# Patient Record
Sex: MALE | Race: WHITE | NOT HISPANIC OR LATINO | Employment: FULL TIME | URBAN - METROPOLITAN AREA
[De-identification: names, ages, dates, MRNs, and addresses within clinical notes are randomized per-mention and may not be internally consistent; named-entity substitution may affect disease eponyms.]

---

## 2017-01-05 ENCOUNTER — OFFICE VISIT (OUTPATIENT)
Dept: BEHAVIORAL HEALTH | Facility: PHYSICIAN GROUP | Age: 42
End: 2017-01-05
Payer: COMMERCIAL

## 2017-01-05 VITALS — HEART RATE: 78 BPM | TEMPERATURE: 98.2 F | RESPIRATION RATE: 16 BRPM

## 2017-01-05 DIAGNOSIS — F31.31 BIPOLAR 1 DISORDER, DEPRESSED, MILD (HCC): ICD-10-CM

## 2017-01-05 PROCEDURE — 99213 OFFICE O/P EST LOW 20 MIN: CPT | Performed by: PSYCHIATRY & NEUROLOGY

## 2017-01-05 NOTE — PROGRESS NOTES
[facility]  PSYCHIATRIC FOLLOW-UP NOTE    Name: Chaitanya Kelly  MRN: [id number]  : 1975  Age: 41 y.o.  Date of assessment: 2017  PCP: Ignacio Castillo M.D.  Persons in attendance: Patient    REASON FOR VISIT/CHIEF COMPLAINT (as stated by Patient):  Chaitanya Kelly is a 41 y.o., White male, attending follow-up appointment for   Chief Complaint   Patient presents with   • Other     The patient is seen today for follow up after three monthsand he is doing well on his medications. The patient did not do the lab.    .      HISTORY OF PRESENT ILLNESS:    This is a 42 yo male, , employed, seen today for follow up on his bipolar disorder. The patient agreed to do his lab next time. The patient reported that he has refills on his medications. The patient was focused on his stress at work and his wife is on disability. The patients parents are helping them financially.    PSYCHOSOCIAL CHANGES SINCE PREVIOUS CONTACT:  Unchanged. The patient is working for [employer] full time and his work load is increasing every day.     RESPONSE TO TREATMENT:  Good.    MEDICATION SIDE EFFECTS:  Denied.    REVIEW OF SYSTEMS:        Constitutional negative   Eyes negative   Ears/Nose/Mouth/Throat negative   Cardiovascular negative   Respiratory positive - sleep apnea.   Gastrointestinal negative   Genitourinary negative   Muscular negative   Integumentary negative   Neurological negative   Endocrine negative   Hematologic/Lymphatic negative       PSYCHIATRIC EXAMINATION/MENTAL STATUS  Pulse 78  Temp(Src) 36.8 °C (98.2 °F)  Resp 16  Participation: Active verbal participation, Attentive and Engaged  Grooming:Casual  Orientation: Alert and Fully Oriented  Eye contact: Good  Behavior:Calm   Mood: Anxious  Affect: Flexible and Full range  Thought process: Logical and Goal-directed  Thought content:  Within normal limits  Speech: Rate within normal limits  Perception:  Within normal limits  Memory:  No gross evidence  of memory deficits  Insight: Good  Judgment: Good  Family/couple interaction observations:   Other:    Current risk:    Suicide: Low   Homicide: Low   Self-harm: Low  Relapse: Low  Other:   Crisis Safety Plan reviewed?Yes  If evidence of imminent risk is present, intervention/plan:    Medical Records/Labs/Diagnostic Tests Reviewed: yes.    Medical Records/Labs/Diagnostic Tests Ordered:   The patient agreed to do blood work on follow up.    DIAGNOSTIC IMPRESSION(S):  1. Bipolar 1 disorder, depressed, mild (HCC)           ASSESSMENT AND PLAN:    Bipolar 1, mild depression  Continue same medications  Lithium Er 450 mg twice a day  Bupropion  mg twice a day  Lamotrigine 100 mg per day  Follow up in three months.     More than 50% of face-to-face time in this 30 minute visit was spent in psychotherapy/counseling.    Topics addressed include:  We focused on medication compliance because that is one of the important factor for stability. The patient has good sense of managing his stress at work. The patients wife is supportive and helps him at home. The patient is sleeping well. We talked about weight reduction and diet.     Matthew Snyder M.D.

## 2017-03-22 RX ORDER — LITHIUM CARBONATE 450 MG
TABLET, EXTENDED RELEASE ORAL
Qty: 60 TAB | Refills: 2 | Status: SHIPPED | OUTPATIENT
Start: 2017-03-22 | End: 2017-06-22 | Stop reason: SDUPTHER

## 2017-03-22 RX ORDER — BUPROPION HYDROCHLORIDE 150 MG/1
TABLET, EXTENDED RELEASE ORAL
Qty: 60 TAB | Refills: 2 | Status: SHIPPED | OUTPATIENT
Start: 2017-03-22 | End: 2017-06-22 | Stop reason: SDUPTHER

## 2017-03-22 RX ORDER — LAMOTRIGINE 100 MG/1
TABLET ORAL
Qty: 30 TAB | Refills: 2 | Status: SHIPPED | OUTPATIENT
Start: 2017-03-22 | End: 2017-06-22 | Stop reason: SDUPTHER

## 2017-03-30 ENCOUNTER — SLEEP CENTER VISIT (OUTPATIENT)
Dept: SLEEP MEDICINE | Facility: MEDICAL CENTER | Age: 42
End: 2017-03-30
Payer: COMMERCIAL

## 2017-03-30 VITALS
BODY MASS INDEX: 35.16 KG/M2 | WEIGHT: 274 LBS | HEART RATE: 82 BPM | DIASTOLIC BLOOD PRESSURE: 98 MMHG | SYSTOLIC BLOOD PRESSURE: 134 MMHG | RESPIRATION RATE: 15 BRPM | HEIGHT: 74 IN

## 2017-03-30 DIAGNOSIS — R03.0: ICD-10-CM

## 2017-03-30 DIAGNOSIS — G47.33 OBSTRUCTIVE SLEEP APNEA: ICD-10-CM

## 2017-03-30 DIAGNOSIS — F31.32 BIPOLAR 1 DISORDER, DEPRESSED, MODERATE (HCC): Chronic | ICD-10-CM

## 2017-03-30 DIAGNOSIS — E66.9 OBESITY (BMI 30-39.9): ICD-10-CM

## 2017-03-30 PROCEDURE — 99213 OFFICE O/P EST LOW 20 MIN: CPT | Performed by: NURSE PRACTITIONER

## 2017-03-30 NOTE — MR AVS SNAPSHOT
"        Chaitanya Kelly   3/30/2017 10:00 AM   Sleep Center Visit   MRN: [id number]    Department:  [facility]   Dept Phone:  [phone]    Description:  Male : 1975   Provider:  NICHOL Colón           Reason for Visit     Results OPO, chip DL       Allergies as of 3/30/2017     Allergen Noted Reactions    Latex 2012   Itching    Ibuprofen 2011       Or [place],  Reaction to other RX      You were diagnosed with     Obstructive sleep apnea   [323745]       Obesity (BMI 30-39.9)   [608612]       Increased blood pressure (not hypertension)   [129727]       Bipolar 1 disorder, depressed, moderate (CMS-HCC)   [661342]         Vital Signs     Blood Pressure Pulse Respirations Height Weight Body Mass Index    134/98 mmHg 82 15 1.88 m (6' 2\") 124.286 kg (274 lb) 35.16 kg/m2    Smoking Status                   Former Smoker           Basic Information     Date Of Birth Sex Race Ethnicity Preferred Language    1975 Male White Non- English      Your appointments     2017 11:00 AM   Follow Up Med Management with Matthew Snyder M.D.   [facility] [address] [place] [address])    [address] [place] [address]  [address]  [facility] [address]   [phone]            Justo 15, 2017  9:00 AM   [facility]    [address] [place] [address] [phone]           You will be receiving a confirmation call a few days before your appointment from our automated call confirmation system.            Sep 19, 2017  9:00 AM   Follow UP with NICHOL Colón   [facility] (--)    [address] [place] [address] A  [facility] [address] [phone]              Problem List              ICD-10-CM Priority Class Noted - Resolved    Dyslipidemia E78.5   2014 - Present    Obesity (BMI 30-39.9) E66.9   2014 - Present    Family history of early CAD Z82.49   2014 - Present   " Obstructive sleep apnea G47.33   4/15/2016 - Present    Neck pain M54.2   9/9/2016 - Present    Increased blood pressure (not hypertension) R03.0   9/9/2016 - Present    Bipolar 1 disorder, depressed, moderate (CMS-HCC) (Chronic) F31.32  Chronic 10/13/2016 - Present    Chronic fatigue R53.82   12/1/2016 - Present    Mild intermittent asthma without complication J45.20   12/22/2016 - Present    Episodic tension-type headache, not intractable G44.219   12/22/2016 - Present      Health Maintenance        Date Due Completion Dates    IMM DTaP/Tdap/Td Vaccine (1 - Tdap) 9/23/1993 ---    IMM PNEUMOCOCCAL 19-64 (ADULT) MEDIUM RISK SERIES (1 of 1 - PPSV23) 9/23/1993 ---    IMM INFLUENZA (1) 9/1/2016 ---            Current Immunizations     No immunizations on file.      Below and/or attached are the medications your provider expects you to take. Review all of your home medications and newly ordered medications with your provider and/or pharmacist. Follow medication instructions as directed by your provider and/or pharmacist. Please keep your medication list with you and share with your provider. Update the information when medications are discontinued, doses are changed, or new medications (including over-the-counter products) are added; and carry medication information at all times in the event of emergency situations     Allergies:  LATEX - Itching     IBUPROFEN - (reactions not documented)               Medications  Valid as of: March 30, 2017 - 10:47 AM    Generic Name Brand Name Tablet Size Instructions for use    BuPROPion HCl (TABLET SR 12 HR) WELLBUTRIN- MG TAKE ONE TABLET BY MOUTH TWICE DAILY        LamoTRIgine (Tab) LAMICTAL 25 MG Take 25 mg by mouth every day.        LamoTRIgine (Tab) LAMICTAL 100 MG TAKE ONE TABLET BY MOUTH IN THE MORNING        Lithium Carbonate (Tab CR) ESKALITH  MG TAKE ONE TABLET BY MOUTH TWICE DAILY        .                 Medicines prescribed today were sent to:     [facility] [address] [place] [address] [address] [address]    Phone: [phone] Fax: [phone]    Open 24 Hours?: No      Medication refill instructions:       If your prescription bottle indicates you have medication refills left, it is not necessary to call your provider’s office. Please contact your pharmacy and they will refill your medication.    If your prescription bottle indicates you do not have any refills left, you may request refills at any time through one of the following ways: The online [employer] system (except Urgent Care), by calling your provider’s office, or by asking your pharmacy to contact your provider’s office with a refill request. Medication refills are processed only during regular business hours and may not be available until the next business day. Your provider may request additional information or to have a follow-up visit with you prior to refilling your medication.   *Please Note: Medication refills are assigned a new Rx number when refilled electronically. Your pharmacy may indicate that no refills were authorized even though a new prescription for the same medication is available at the pharmacy. Please request the medicine by name with the pharmacy before contacting your provider for a refill.        Instructions    1. Continue auto CPAP nightly  2. Clean mask and tubing weekly  3. Follow-up in 6 months, sooner if needed. If fatigue persists consider in lab titration.       Other Notes About Your Plan     DME:  Israel Osorio ph [phone] / fax [phone]             [employer] Access Code: Activation code not generated  Current [employer] Status: Active

## 2017-03-30 NOTE — PATIENT INSTRUCTIONS
1. Continue auto CPAP nightly  2. Clean mask and tubing weekly  3. Follow-up in 6 months, sooner if needed. If fatigue persists consider in lab titration.

## 2017-03-30 NOTE — PROGRESS NOTES
Chief Complaint   Patient presents with   • Results     OPO, chip DL          HPI: This patient is a 41 y.o. male, who presents for follow-up of TOBI.  Initial sleep study in 2010 indicated severe TOBI with an  events per hour and a minimum oxygen saturation of 87%. He was treated with CPAP 16 cm H2O but his machine malfunctioned. Home sleep study April 2016 demonstrated an AHI of 22.8 and a minimum saturation of 85%. He was provided a new auto CPAP machine. He is compliant with auto CPAP 12-15 cm H2O. Compliance report over the past 90 days indicates 98% use, average use of 5 hours 35 minutes per night, AHI has been reduced to 0.4. Overnight oximetry indicates adequate saturation on CPAP nasal SPO2 of 92.3%. Patient continues to complain of some fatigue. He does not feel as refreshed as he did on CPAP at 16 cm H2O. He attributes part of this to his job and medications for bipolar disorder. He feels his symptoms of bipolar disease are well managed with current medications. He has had significant stressors on his job. He will be changing positions in hopes that this will help his fatigue. We discussed trial of higher pressure vs in lab study. He may consider these at his next visit, first he would like to change job positions to determine if this helps with his fatigue.      Past Medical History   Diagnosis Date   • ASTHMA    • Headache(784.0)    • Bipolar 2 disorder    • Bipolar disorder (CMS-[facility])        Social History   Substance Use Topics   • Smoking status: Former Smoker     Quit date: 01/01/2007   • Smokeless tobacco: Never Used   • Alcohol Use: No       Family History   Problem Relation Age of Onset   • Heart Disease Father 41     heart attack   • Heart Disease Paternal Aunt    • Heart Disease Paternal Uncle    • Stroke Paternal Uncle    • Heart Disease Paternal Grandfather        Current medications as of today   Current Outpatient Prescriptions   Medication Sig Dispense Refill   • buPROPion SR  "(WELLBUTRIN-SR) 150 MG TABLET SR 12 HR sustained-release tablet TAKE ONE TABLET BY MOUTH TWICE DAILY 60 Tab 2   • lithium CR (ESKALITH CR) 450 MG Tab CR TAKE ONE TABLET BY MOUTH TWICE DAILY 60 Tab 2   • lamotrigine (LAMICTAL) 100 MG Tab TAKE ONE TABLET BY MOUTH IN THE MORNING 30 Tab 2   • lamotrigine (LAMICTAL) 25 MG TABS Take 25 mg by mouth every day.       No current facility-administered medications for this visit.       Allergies: Latex and Ibuprofen    Blood pressure 134/98, pulse 82, resp. rate 15, height 1.88 m (6' 2\"), weight 124.286 kg (274 lb).      ROS:   Constitutional: Denies fevers, chills, night sweats, weight loss. Positive for fatigue.  HEENT: Denies earache, difficulty hearing, tinnitus, nasal congestion, hoarseness  Cardiovascular: Denies chest pain, tightness, palpitations, orthopnea or edema  Respiratory: Denies cough, wheeze, dyspnea, hemoptysis  Sleep: See HPI  GI: Denies heartburn, dysphagia, nausea, abdominal pain, diarrhea or constipation  : Denies frequent urination, hematuria, discharge or painful urination  Musculoskeletal: Positive for chronic pain  Neurological: Positive for migraine  Skin: No rashes    Physical exam:   Appearance: Well-nourished, well-developed, in no acute distress  HEENT: Normocephalic, atraumatic, white sclera, PERRLA  Respiratory: no intercostal retractions or accessory muscle use   Gait: Normal  Digits: No clubbing, cyanosis  Motor: No focal deficits  Orientation: Oriented to time, person and place    Diagnosis:  1. Obstructive sleep apnea     2. Obesity (BMI 30-39.9)     3. Increased blood pressure (not hypertension)     4. Bipolar 1 disorder, depressed, moderate (CMS-HCC)         Plan:  1. Continue auto CPAP nightly  2. Clean mask and tubing weekly  3. Follow-up in 6 months, sooner if needed. If fatigue persists consider in lab titration.    "

## 2017-04-06 ENCOUNTER — APPOINTMENT (OUTPATIENT)
Dept: BEHAVIORAL HEALTH | Facility: PHYSICIAN GROUP | Age: 42
End: 2017-04-06

## 2017-06-22 RX ORDER — BUPROPION HYDROCHLORIDE 150 MG/1
TABLET, EXTENDED RELEASE ORAL
Qty: 60 TAB | Refills: 1 | Status: SHIPPED | OUTPATIENT
Start: 2017-06-22 | End: 2017-08-21 | Stop reason: SDUPTHER

## 2017-06-22 RX ORDER — LAMOTRIGINE 100 MG/1
TABLET ORAL
Qty: 30 TAB | Refills: 1 | Status: SHIPPED | OUTPATIENT
Start: 2017-06-22 | End: 2017-08-21 | Stop reason: SDUPTHER

## 2017-06-22 RX ORDER — LITHIUM CARBONATE 450 MG
TABLET, EXTENDED RELEASE ORAL
Qty: 60 TAB | Refills: 0 | Status: SHIPPED | OUTPATIENT
Start: 2017-06-22 | End: 2017-07-24 | Stop reason: SDUPTHER

## 2017-07-24 RX ORDER — LITHIUM CARBONATE 450 MG
TABLET, EXTENDED RELEASE ORAL
Qty: 60 TAB | Refills: 1 | Status: SHIPPED | OUTPATIENT
Start: 2017-07-24 | End: 2017-09-21 | Stop reason: SDUPTHER

## 2017-07-24 NOTE — TELEPHONE ENCOUNTER
Was the patient seen in the last year in this department? Yes     Does patient have an active prescription for medications requested? Yes     Received Request Via: Pharmacy     FYI: Last seen 1/2017, L/M for pt to call and schedule an appointment

## 2017-07-25 ENCOUNTER — OFFICE VISIT (OUTPATIENT)
Dept: MEDICAL GROUP | Facility: PHYSICIAN GROUP | Age: 42
End: 2017-07-25
Payer: COMMERCIAL

## 2017-07-25 VITALS
HEART RATE: 61 BPM | DIASTOLIC BLOOD PRESSURE: 80 MMHG | OXYGEN SATURATION: 99 % | TEMPERATURE: 97.5 F | SYSTOLIC BLOOD PRESSURE: 126 MMHG | BODY MASS INDEX: 34.27 KG/M2 | HEIGHT: 74 IN | WEIGHT: 267 LBS | RESPIRATION RATE: 16 BRPM

## 2017-07-25 DIAGNOSIS — R19.00 ABDOMINAL WALL BULGE: ICD-10-CM

## 2017-07-25 DIAGNOSIS — Z13.6 SCREENING FOR CARDIOVASCULAR CONDITION: ICD-10-CM

## 2017-07-25 DIAGNOSIS — F31.32 BIPOLAR 1 DISORDER, DEPRESSED, MODERATE (HCC): Chronic | ICD-10-CM

## 2017-07-25 DIAGNOSIS — Z13.1 SCREENING FOR DIABETES MELLITUS (DM): ICD-10-CM

## 2017-07-25 PROCEDURE — 99214 OFFICE O/P EST MOD 30 MIN: CPT | Performed by: INTERNAL MEDICINE

## 2017-07-25 NOTE — PROGRESS NOTES
"Subjective:   Chaitanya Kelly is a 41 y.o. male here today for abdominal wall bulge, bipolar disorder    Abdominal wall bulge  Pt reports that he has an area of abdominal bulge that is located above his belly button. He noticed it in April. He states that when he pushes on it it feels like a soft fluid filled area and it causes pain in the area. He denies fevers/chills, nausea/vomiting, constipation/diarrhea. There has been no significant change in size or severity in the past few months. He has no difficulty lifting objects but if he puts pressure on his abdomen then it hurts. He has not had similar symptoms in the past.     Bipolar 1 disorder, depressed, moderate (CMS-HCC)  Pt follows with Dr. Snyder of psychiatry for this. He is on buproprion, lamotrigine, and lithium all managed by psychiatry. He reports that his mood is good and that this is stable. He denies suicidal or homicidal ideation.        Current medicines (including changes today)  Current Outpatient Prescriptions   Medication Sig Dispense Refill   • lithium CR (ESKALITH CR) 450 MG Tab CR TAKE ONE TABLET BY MOUTH TWICE DAILY 60 Tab 1   • lamotrigine (LAMICTAL) 100 MG Tab TAKE ONE TABLET BY MOUTH IN THE MORNING 30 Tab 1   • buPROPion SR (WELLBUTRIN-SR) 150 MG TABLET SR 12 HR sustained-release tablet TAKE ONE TABLET BY MOUTH TWICE DAILY 60 Tab 1   • lamotrigine (LAMICTAL) 25 MG TABS Take 25 mg by mouth every day.       No current facility-administered medications for this visit.     He  has a past medical history of ASTHMA; Headache; Bipolar 2 disorder; and Bipolar disorder (CMS-HCC).    ROS   No fevers/chills, no nausea/vomiting     Objective:     Blood pressure 126/80, pulse 61, temperature 36.4 °C (97.5 °F), resp. rate 16, height 1.88 m (6' 2\"), weight 121.11 kg (267 lb), SpO2 99 %. Body mass index is 34.27 kg/(m^2).   Physical Exam:  Constitutional: Alert & oriented, no acute distress  Eye: Conjunctiva clear, lids normal, no discharge  ENMT: " Lips without lesions, normal external nose and ears  Respiratory: Unlabored respiratory effort, lungs clear to auscultation, no wheezes, no ronchi  Cardiovascular: Normal S1, S2, no murmur, no edema  Abdomen: Mild protrusion of epigastric area that is not tender to palpation and most likely a hernia  Skin: Warm, dry, good turgor, no rashes in visible areas  Neuro: No overt focal neurologic deficits, normal gait  Psych: Normal mood and affect      Assessment and Plan:   The following treatment plan was discussed    1. Abdominal wall bulge  Likely hernia. Pt counseled on ER indications (symptoms indicative of strangulation). Will get ultrasound to further evaluate. If positive will refer to general surgery  - CBC WITH DIFFERENTIAL; Future  - US-HERNIA ABDOMEN; Future    2. Bipolar 1 disorder, depressed, moderate (CMS-HCC)  Well controlled on current medications. Continue follow up and treatment per psychiatry    3. Screening for cardiovascular condition  - LIPID PROFILE; Future    4. Screening for diabetes mellitus (DM)  - COMP METABOLIC PANEL; Future    5. BMI 34.0-34.9,adult  - REFERRAL TO [facility] IMPROVEMENT PROGRAMS (HIP) Services Requested:: Weight Management Program; Reason for Visit:: Overweight/Obesity; Future      Followup: Return in about 4 months (around 11/25/2017).    Please note that this dictation was created using voice recognition software. I have made every reasonable attempt to correct obvious errors, but I expect that there are errors of grammar and possibly content that I did not discover before finalizing the note.

## 2017-07-25 NOTE — ASSESSMENT & PLAN NOTE
Pt reports that he has an area of abdominal bulge that is located above his belly button. He noticed it in April. He states that when he pushes on it it feels like a soft fluid filled area and it causes pain in the area. He denies fevers/chills, nausea/vomiting, constipation/diarrhea. There has been no significant change in size or severity in the past few months. He has no difficulty lifting objects but if he puts pressure on his abdomen then it hurts. He has not had similar symptoms in the past.

## 2017-07-25 NOTE — ASSESSMENT & PLAN NOTE
Pt follows with Dr. Snyder of psychiatry for this. He is on buproprion, lamotrigine, and lithium all managed by psychiatry. He reports that his mood is good and that this is stable. He denies suicidal or homicidal ideation.

## 2017-08-04 ENCOUNTER — HOSPITAL ENCOUNTER (OUTPATIENT)
Dept: RADIOLOGY | Facility: MEDICAL CENTER | Age: 42
End: 2017-08-04
Attending: INTERNAL MEDICINE
Payer: COMMERCIAL

## 2017-08-04 DIAGNOSIS — R19.00 ABDOMINAL WALL BULGE: ICD-10-CM

## 2017-08-04 DIAGNOSIS — K46.9 HERNIA: ICD-10-CM

## 2017-08-04 PROCEDURE — 76705 ECHO EXAM OF ABDOMEN: CPT

## 2017-08-21 ENCOUNTER — TELEPHONE (OUTPATIENT)
Dept: MEDICAL GROUP | Facility: PHYSICIAN GROUP | Age: 42
End: 2017-08-21

## 2017-08-21 RX ORDER — BUPROPION HYDROCHLORIDE 150 MG/1
TABLET, EXTENDED RELEASE ORAL
Qty: 60 TAB | Refills: 2 | Status: SHIPPED | OUTPATIENT
Start: 2017-08-21 | End: 2018-02-13 | Stop reason: SDUPTHER

## 2017-08-21 RX ORDER — LAMOTRIGINE 100 MG/1
TABLET ORAL
Qty: 30 TAB | Refills: 2 | Status: SHIPPED | OUTPATIENT
Start: 2017-08-21 | End: 2017-11-21 | Stop reason: SDUPTHER

## 2017-08-21 NOTE — TELEPHONE ENCOUNTER
Please inform pt that his general surgery referral has been placed and give him information regarding referral. Thank you    Ignacio Castillo M.D.

## 2017-08-21 NOTE — TELEPHONE ENCOUNTER
1. Caller Name: Chaitanya                                         Call Back Number: [phone]      Patient approves a detailed voicemail message:    Patient states has umbilical hernia that has been getting worse. Has noticed that after working all day he is a lot more pain . Please advise   Should he make appointment

## 2017-08-22 ENCOUNTER — OFFICE VISIT (OUTPATIENT)
Dept: BEHAVIORAL HEALTH | Facility: PHYSICIAN GROUP | Age: 42
End: 2017-08-22
Payer: COMMERCIAL

## 2017-08-22 VITALS
HEART RATE: 76 BPM | HEIGHT: 74 IN | BODY MASS INDEX: 33.88 KG/M2 | RESPIRATION RATE: 16 BRPM | TEMPERATURE: 97.9 F | DIASTOLIC BLOOD PRESSURE: 76 MMHG | WEIGHT: 264 LBS | SYSTOLIC BLOOD PRESSURE: 128 MMHG

## 2017-08-22 DIAGNOSIS — F31.9 BIPOLAR 1 DISORDER, DEPRESSED (HCC): ICD-10-CM

## 2017-08-22 PROCEDURE — 90833 PSYTX W PT W E/M 30 MIN: CPT | Performed by: PSYCHIATRY & NEUROLOGY

## 2017-08-22 PROCEDURE — 99213 OFFICE O/P EST LOW 20 MIN: CPT | Performed by: PSYCHIATRY & NEUROLOGY

## 2017-08-22 NOTE — PROGRESS NOTES
"[facility]  PSYCHIATRIC FOLLOW-UP NOTE    Name: Chaitanya Kelly  MRN: [id number]  : 1975  Age: 41 y.o.  Date of assessment: 2017  PCP: Ignacio Castillo M.D.  Persons in attendance: Patient  REASON FOR VISIT/CHIEF COMPLAINT (as stated by Patient):  Chaitanya Kelly is a 41 y.o., White male, attending follow-up appointment for   Chief Complaint   Patient presents with   • Medication Management     The patient is seen today for follow up on his bipolar disorder and he missed his lab work.   .      HISTORY OF PRESENT ILLNESS:    This is a 40 yo female, , employed, seen today for follow up after six months. The patient is going for hernia surgery in few weeks and he agreed to do the lab at that time. The patients wife is recovering from [place] palsy. The patient reported irritation every day and he has been taking bupropion. The patient has been on lithium and lamictal  As a mood stabilizer. The patient denied having manic episode and he has been sleeping well.      PSYCHOSOCIAL CHANGES SINCE PREVIOUS CONTACT:  The patient reported frequent irritation and he denied depression.    RESPONSE TO TREATMENT:  The patient denied dione, and hypomania.    MEDICATION SIDE EFFECTS:  Weight gain.    REVIEW OF SYSTEMS:        Constitutional positive - obesity   Eyes negative   Ears/Nose/Mouth/Throat negative   Cardiovascular negative   Respiratory positive - obstructive sleep apnea   Gastrointestinal negative   Genitourinary negative   Muscular positive - abdominal hernia.   Integumentary negative   Neurological negative   Endocrine positive - hyperlipidemia   Hematologic/Lymphatic negative       PSYCHIATRIC EXAMINATION/MENTAL STATUS  /76 mmHg  Pulse 76  Temp(Src) 36.6 °C (97.9 °F)  Resp 16  Ht 1.88 m (6' 2.02\")  Wt 119.75 kg (264 lb)  BMI 33.88 kg/m2  Participation: Active verbal participation, Attentive and Engaged  Grooming:Neat  Orientation: Alert and Fully Oriented  Eye contact: " Good  Behavior:Calm   Mood: Anxious  Affect: Anxious  Thought process: Logical and Goal-directed  Thought content:  Within normal limits  Speech: Rate within normal limits and Volume within normal limits  Perception:  Within normal limits  Memory:  No gross evidence of memory deficits  Insight: Good  Judgment: Good  Family/couple interaction observations:   Other:    Current risk:    Suicide: Low   Homicide: Low   Self-harm: Low  Relapse: Low  Other:   Crisis Safety Plan reviewed?Yes  If evidence of imminent risk is present, intervention/plan:    Medical Records/Labs/Diagnostic Tests Reviewed: yes.    Medical Records/Labs/Diagnostic Tests Ordered:   The patient agreed to do lab in few weeks before the surgery.    DIAGNOSTIC IMPRESSION(S):  1. Bipolar 1 disorder, depressed (CMS-[facility])           ASSESSMENT AND PLAN:    1. Bipolar 1, de[ressed.  Stop bupropion because of irritation.  Continue   Lithium  mg BID  Lamotrigine 100 mg per day.    2. Follow up in three months.    16 minutes were spent in psychotherapy.  (If greater than 16 minutes, add 44999 code)   Topics addressed in psychotherapy include:  Helped the patient to monitor symptom effectively, identify potential triggers for relapse, and taught coping skills.  The patient agreed to use mood graph and symptom summary work sheet.  The patient agreed to keep a daily routine and a good sleep cycle  Taught relaxation method.  Matthew Snyder M.D.

## 2017-09-22 RX ORDER — LITHIUM CARBONATE 450 MG
TABLET, EXTENDED RELEASE ORAL
Qty: 60 TAB | Refills: 1 | Status: SHIPPED | OUTPATIENT
Start: 2017-09-22 | End: 2017-11-21 | Stop reason: SDUPTHER

## 2017-09-27 ENCOUNTER — TELEPHONE (OUTPATIENT)
Dept: MEDICAL GROUP | Facility: PHYSICIAN GROUP | Age: 42
End: 2017-09-27

## 2017-09-27 NOTE — LETTER
[facility]  [address] [address] [address]     September 27, 2017    Patient: Chaitanya Kelly   YOB: 1975   Date of Visit: 9/27/2017       To Whom It May Concern:    Chaitanya Kelly is seen in our department. He gets recurrent   headaches and during these times I feel he would benefit from not   being at work as this would likely worsen said headaches. Thank   you for your consideration in this matter.     Sincerely,         Ignacio Castillo M.D.

## 2017-09-27 NOTE — TELEPHONE ENCOUNTER
Pt would like a work excuse for his Headaches that he gets frequently.  Says it happens several times out of a month and is wanting us to write something so he can call out of work when it happens.

## 2017-10-04 ENCOUNTER — OFFICE VISIT (OUTPATIENT)
Dept: MEDICAL GROUP | Facility: PHYSICIAN GROUP | Age: 42
End: 2017-10-04
Payer: COMMERCIAL

## 2017-10-04 VITALS
HEIGHT: 74 IN | DIASTOLIC BLOOD PRESSURE: 86 MMHG | OXYGEN SATURATION: 98 % | WEIGHT: 270 LBS | RESPIRATION RATE: 12 BRPM | SYSTOLIC BLOOD PRESSURE: 142 MMHG | HEART RATE: 68 BPM | TEMPERATURE: 97.7 F | BODY MASS INDEX: 34.65 KG/M2

## 2017-10-04 DIAGNOSIS — F31.32 BIPOLAR 1 DISORDER, DEPRESSED, MODERATE (HCC): Chronic | ICD-10-CM

## 2017-10-04 DIAGNOSIS — G44.89 OTHER HEADACHE SYNDROME: ICD-10-CM

## 2017-10-04 PROCEDURE — 99214 OFFICE O/P EST MOD 30 MIN: CPT | Performed by: INTERNAL MEDICINE

## 2017-10-04 RX ORDER — SUMATRIPTAN 50 MG/1
50 TABLET, FILM COATED ORAL
Qty: 10 TAB | Refills: 3 | Status: SHIPPED | OUTPATIENT
Start: 2017-10-04 | End: 2020-07-14

## 2017-10-04 RX ORDER — BUTALBITAL, ACETAMINOPHEN AND CAFFEINE 300; 40; 50 MG/1; MG/1; MG/1
1 CAPSULE ORAL EVERY 8 HOURS PRN
Qty: 10 CAP | Refills: 1 | Status: SHIPPED | OUTPATIENT
Start: 2017-10-04 | End: 2017-10-10

## 2017-10-04 ASSESSMENT — PATIENT HEALTH QUESTIONNAIRE - PHQ9
5. POOR APPETITE OR OVEREATING: 0 - NOT AT ALL
SUM OF ALL RESPONSES TO PHQ QUESTIONS 1-9: 2
CLINICAL INTERPRETATION OF PHQ2 SCORE: 1
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
## 2017-10-04 NOTE — ASSESSMENT & PLAN NOTE
Pt follows with Dr. Snyder for this. He is on lithium, lamotrigine, and bupropion for this as managed by psychiatrist. Pt reports he drinks caffeine regularly. His mood is well controlled currently.
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
## 2017-10-08 DIAGNOSIS — G44.89 OTHER HEADACHE SYNDROME: ICD-10-CM

## 2017-10-09 NOTE — PROGRESS NOTES
Was informed that insurance will not cover CT but MRI is recommended instead. Order placed for this. Please inform patient. I believe he can keep same appointment but am not sure about this.     Ignacio Castillo M.D.

## 2017-10-10 ENCOUNTER — HOSPITAL ENCOUNTER (OUTPATIENT)
Dept: RADIOLOGY | Facility: MEDICAL CENTER | Age: 42
End: 2017-10-10
Attending: INTERNAL MEDICINE | Admitting: SURGERY
Payer: COMMERCIAL

## 2017-10-10 DIAGNOSIS — R51.9 NONINTRACTABLE HEADACHE, UNSPECIFIED CHRONICITY PATTERN, UNSPECIFIED HEADACHE TYPE: ICD-10-CM

## 2017-10-10 DIAGNOSIS — Z01.812 PRE-OPERATIVE LABORATORY EXAMINATION: ICD-10-CM

## 2017-10-10 DIAGNOSIS — G44.89 OTHER HEADACHE SYNDROME: ICD-10-CM

## 2017-10-10 DIAGNOSIS — G93.0 ARACHNOID CYST: ICD-10-CM

## 2017-10-10 LAB
ALBUMIN SERPL BCP-MCNC: 4.5 G/DL (ref 3.2–4.9)
ALBUMIN/GLOB SERPL: 2 G/DL
ALP SERPL-CCNC: 72 U/L (ref 30–99)
ALT SERPL-CCNC: 25 U/L (ref 2–50)
ANION GAP SERPL CALC-SCNC: 5 MMOL/L (ref 0–11.9)
AST SERPL-CCNC: 18 U/L (ref 12–45)
BASOPHILS # BLD AUTO: 0.3 % (ref 0–1.8)
BASOPHILS # BLD: 0.02 K/UL (ref 0–0.12)
BILIRUB SERPL-MCNC: 0.8 MG/DL (ref 0.1–1.5)
BUN SERPL-MCNC: 16 MG/DL (ref 8–22)
CALCIUM SERPL-MCNC: 9.6 MG/DL (ref 8.5–10.5)
CHLORIDE SERPL-SCNC: 105 MMOL/L (ref 96–112)
CO2 SERPL-SCNC: 24 MMOL/L (ref 20–33)
CREAT SERPL-MCNC: 0.92 MG/DL (ref 0.5–1.4)
EOSINOPHIL # BLD AUTO: 0.12 K/UL (ref 0–0.51)
EOSINOPHIL NFR BLD: 1.8 % (ref 0–6.9)
ERYTHROCYTE [DISTWIDTH] IN BLOOD BY AUTOMATED COUNT: 43.8 FL (ref 35.9–50)
GFR SERPL CREATININE-BSD FRML MDRD: >60 ML/MIN/1.73 M 2
GLOBULIN SER CALC-MCNC: 2.3 G/DL (ref 1.9–3.5)
GLUCOSE SERPL-MCNC: 94 MG/DL (ref 65–99)
HCT VFR BLD AUTO: 44.6 % (ref 42–52)
HGB BLD-MCNC: 14.6 G/DL (ref 14–18)
IMM GRANULOCYTES # BLD AUTO: 0.02 K/UL (ref 0–0.11)
IMM GRANULOCYTES NFR BLD AUTO: 0.3 % (ref 0–0.9)
LYMPHOCYTES # BLD AUTO: 1.39 K/UL (ref 1–4.8)
LYMPHOCYTES NFR BLD: 21.1 % (ref 22–41)
MCH RBC QN AUTO: 28.3 PG (ref 27–33)
MCHC RBC AUTO-ENTMCNC: 32.7 G/DL (ref 33.7–35.3)
MCV RBC AUTO: 86.4 FL (ref 81.4–97.8)
MONOCYTES # BLD AUTO: 0.47 K/UL (ref 0–0.85)
MONOCYTES NFR BLD AUTO: 7.1 % (ref 0–13.4)
NEUTROPHILS # BLD AUTO: 4.56 K/UL (ref 1.82–7.42)
NEUTROPHILS NFR BLD: 69.4 % (ref 44–72)
NRBC # BLD AUTO: 0 K/UL
NRBC BLD AUTO-RTO: 0 /100 WBC
PLATELET # BLD AUTO: 280 K/UL (ref 164–446)
PMV BLD AUTO: 9.7 FL (ref 9–12.9)
POTASSIUM SERPL-SCNC: 3.8 MMOL/L (ref 3.6–5.5)
PROT SERPL-MCNC: 6.8 G/DL (ref 6–8.2)
RBC # BLD AUTO: 5.16 M/UL (ref 4.7–6.1)
SODIUM SERPL-SCNC: 134 MMOL/L (ref 135–145)
WBC # BLD AUTO: 6.6 K/UL (ref 4.8–10.8)

## 2017-10-10 PROCEDURE — 70551 MRI BRAIN STEM W/O DYE: CPT

## 2017-10-10 PROCEDURE — 80053 COMPREHEN METABOLIC PANEL: CPT

## 2017-10-10 PROCEDURE — 36415 COLL VENOUS BLD VENIPUNCTURE: CPT

## 2017-10-10 PROCEDURE — 85025 COMPLETE CBC W/AUTO DIFF WBC: CPT

## 2017-10-11 ENCOUNTER — TELEPHONE (OUTPATIENT)
Dept: MEDICAL GROUP | Facility: PHYSICIAN GROUP | Age: 42
End: 2017-10-11

## 2017-10-11 NOTE — TELEPHONE ENCOUNTER
----- Message from Charlotte Lilly, Med Ass't sent at 10/11/2017  3:55 PM PDT -----  Pt's wife notified.  Will this finding interfere with his upcoming hernia surgery on 10/18/2017?

## 2017-10-11 NOTE — TELEPHONE ENCOUNTER
As far as I'm aware this will not affect surgery, but I recommend pt let anesthesiologist and surgeon know about this finding prior to surgery so they can reference the MRI result.     Ignacio Castillo M.D.

## 2017-10-18 ENCOUNTER — HOSPITAL ENCOUNTER (OUTPATIENT)
Facility: MEDICAL CENTER | Age: 42
End: 2017-10-18
Attending: SURGERY | Admitting: SURGERY
Payer: COMMERCIAL

## 2017-10-18 VITALS
WEIGHT: 267.42 LBS | RESPIRATION RATE: 16 BRPM | HEIGHT: 74 IN | HEART RATE: 69 BPM | OXYGEN SATURATION: 98 % | TEMPERATURE: 98.2 F | BODY MASS INDEX: 34.32 KG/M2

## 2017-10-18 PROBLEM — K42.9 UMBILICAL HERNIA WITHOUT OBSTRUCTION OR GANGRENE: Status: ACTIVE | Noted: 2017-10-18

## 2017-10-18 PROCEDURE — 160035 HCHG PACU - 1ST 60 MINS PHASE I: Performed by: SURGERY

## 2017-10-18 PROCEDURE — 700111 HCHG RX REV CODE 636 W/ 250 OVERRIDE (IP)

## 2017-10-18 PROCEDURE — 160025 RECOVERY II MINUTES (STATS): Performed by: SURGERY

## 2017-10-18 PROCEDURE — 160048 HCHG OR STATISTICAL LEVEL 1-5: Performed by: SURGERY

## 2017-10-18 PROCEDURE — 160038 HCHG SURGERY MINUTES - EA ADDL 1 MIN LEVEL 2: Performed by: SURGERY

## 2017-10-18 PROCEDURE — C1781 MESH (IMPLANTABLE): HCPCS | Performed by: SURGERY

## 2017-10-18 PROCEDURE — 160009 HCHG ANES TIME/MIN: Performed by: SURGERY

## 2017-10-18 PROCEDURE — 160027 HCHG SURGERY MINUTES - 1ST 30 MINS LEVEL 2: Performed by: SURGERY

## 2017-10-18 PROCEDURE — A9270 NON-COVERED ITEM OR SERVICE: HCPCS

## 2017-10-18 PROCEDURE — 700101 HCHG RX REV CODE 250

## 2017-10-18 PROCEDURE — 501838 HCHG SUTURE GENERAL: Performed by: SURGERY

## 2017-10-18 PROCEDURE — 160002 HCHG RECOVERY MINUTES (STAT): Performed by: SURGERY

## 2017-10-18 PROCEDURE — 160036 HCHG PACU - EA ADDL 30 MINS PHASE I: Performed by: SURGERY

## 2017-10-18 PROCEDURE — 160046 HCHG PACU - 1ST 60 MINS PHASE II: Performed by: SURGERY

## 2017-10-18 PROCEDURE — 700102 HCHG RX REV CODE 250 W/ 637 OVERRIDE(OP)

## 2017-10-18 DEVICE — MESH VENTRALEX ST W/STRAP - 4.3CM SMALL (1EA/CA): Type: IMPLANTABLE DEVICE | Status: FUNCTIONAL

## 2017-10-18 RX ORDER — ONDANSETRON 4 MG/1
4 TABLET, FILM COATED ORAL EVERY 4 HOURS PRN
Qty: 10 TAB | Refills: 0 | Status: SHIPPED | OUTPATIENT
Start: 2017-10-18 | End: 2017-10-25

## 2017-10-18 RX ORDER — OXYCODONE HCL 5 MG/5 ML
SOLUTION, ORAL ORAL
Status: COMPLETED
Start: 2017-10-18 | End: 2017-10-18

## 2017-10-18 RX ORDER — SODIUM CHLORIDE, SODIUM LACTATE, POTASSIUM CHLORIDE, CALCIUM CHLORIDE 600; 310; 30; 20 MG/100ML; MG/100ML; MG/100ML; MG/100ML
INJECTION, SOLUTION INTRAVENOUS CONTINUOUS
Status: DISCONTINUED | OUTPATIENT
Start: 2017-10-18 | End: 2017-10-18 | Stop reason: HOSPADM

## 2017-10-18 RX ORDER — BUPIVACAINE HYDROCHLORIDE AND EPINEPHRINE 2.5; 5 MG/ML; UG/ML
INJECTION, SOLUTION EPIDURAL; INFILTRATION; INTRACAUDAL; PERINEURAL
Status: DISCONTINUED | OUTPATIENT
Start: 2017-10-18 | End: 2017-10-18 | Stop reason: HOSPADM

## 2017-10-18 RX ORDER — OXYCODONE HYDROCHLORIDE 5 MG/1
5 TABLET ORAL EVERY 4 HOURS PRN
Qty: 30 TAB | Refills: 0 | Status: SHIPPED | OUTPATIENT
Start: 2017-10-18 | End: 2020-07-14

## 2017-10-18 RX ADMIN — FENTANYL CITRATE 25 MCG: 50 INJECTION, SOLUTION INTRAMUSCULAR; INTRAVENOUS at 15:42

## 2017-10-18 RX ADMIN — OXYCODONE HYDROCHLORIDE 5 MG: 5 SOLUTION ORAL at 15:05

## 2017-10-18 RX ADMIN — SODIUM CHLORIDE, SODIUM LACTATE, POTASSIUM CHLORIDE, CALCIUM CHLORIDE: 600; 310; 30; 20 INJECTION, SOLUTION INTRAVENOUS at 11:12

## 2017-10-18 RX ADMIN — FENTANYL CITRATE 25 MCG: 50 INJECTION, SOLUTION INTRAMUSCULAR; INTRAVENOUS at 15:52

## 2017-10-18 RX ADMIN — FENTANYL CITRATE 25 MCG: 50 INJECTION, SOLUTION INTRAMUSCULAR; INTRAVENOUS at 15:06

## 2017-10-18 RX ADMIN — OXYCODONE HYDROCHLORIDE 5 MG: 5 SOLUTION ORAL at 15:40

## 2017-10-18 ASSESSMENT — PAIN SCALES - GENERAL
PAINLEVEL_OUTOF10: 1
PAINLEVEL_OUTOF10: 4
PAINLEVEL_OUTOF10: 0
PAINLEVEL_OUTOF10: 5
PAINLEVEL_OUTOF10: 6

## 2017-10-18 NOTE — OR SURGEON
Immediate Post OP Note    PreOp Diagnosis: Umbilical hernia    PostOp Diagnosis: Same    Procedure(s):  UMBILICAL HERNIA REPAIR - Wound Class: Clean    Surgeon(s):  Chinedu Kamara M.D.    Anesthesiologist/Type of Anesthesia:  Anesthesiologist: Aram Romo M.D./General    Surgical Staff:  Assistant: JENNIFER Gonzalez  Circulator: Mariluz Torres  Scrub Person: Zoe Vargas C.N.A.  [employer]: Monica Hou R.N.    Specimens:  None    Estimated Blood Loss: Minimal    Findings: Incarcerated umbilical hernia    Complications: None        10/18/2017 2:37 PM Gisell Meek

## 2017-10-18 NOTE — OP REPORT
DATE OF OPERATION: 10/18/2017     PREOPERATIVE DIAGNOSIS: Midline Umbilical/Ventral Hernia    POSTOPERATIVE DIAGNOSIS: Same.     PROCEDURE:  Ventral Hernia Repair with Mesh    SURGEON: Chinedu Kamara    ASSISTANT: BENTLEY Meek    ANESTHESIOLOGIST:  ROSEY Romo MD    ANESTHESIA: General      INDICATIONS: The patient is a 42 y.o. male with a symptomatic umbilical - ventral hernia which extends superiorly.     Procedure, alternatives and risks were discussed with the patient or guardian.  We specifically discussed risk of bleeding, infection, injury to intestines, mesh erosion, hernia recurrence.  Questions were answered and they wished to proceed.    OPERATION:  The abdomen was prepped and draped in sterile fashion.  A midline   incision was made.  The incarcerated hernia was  from the   subcutaneous tissues and subsequently reduced.   The stalk was  from the fascia.  The defect extended into the midline fascia.    The fascia was then elevated   and the preperitoneal space developed.  Once an adequate space had been   developed, a Ventralex ST mesh (Bard) was placed.  The mesh lay evenly and   flat against the fascia.  The fascia was then closed over the mesh using   interrupted 0 Mersilene suture.  The wound was irrigated.  There was no active   hemorrhage.  Sponge and needle counts correct x2.  The subcutaneous tissues   were closed using interrupted 3-0 Vicryl.  Skin was closed using a running 4-0   Monocryl subcuticular suture.  Patient tolerated the procedure well; was   taken to recovery room in stable condition.    ESTIMATED BLOOD LOSS:  Minimal.    SPECIMENS TO PATHOLOGY:  None.    CONDITION TO PAR:  Stable.       ____________________________________     CHINEDU KAMARA MD        DD: 10/18/2017  4:32 PM

## 2017-10-18 NOTE — DISCHARGE INSTRUCTIONS
ACTIVITY: Rest and take it easy for the first 24 hours.  A responsible adult is recommended to remain with you during that time.  It is normal to feel sleepy.  We encourage you to not do anything that requires balance, judgment or coordination.    MILD FLU-LIKE SYMPTOMS ARE NORMAL. YOU MAY EXPERIENCE GENERALIZED MUSCLE ACHES, THROAT IRRITATION, HEADACHE AND/OR SOME NAUSEA.    FOR 24 HOURS DO NOT:  Drive, operate machinery or run household appliances.  Drink beer or alcoholic beverages.   Make important decisions or sign legal documents.    SPECIAL INSTRUCTIONS:   Diet as tolerated.  No heavy lifting (no more than 10 pounds) x 6 weeks.  May shower in a.m. No tub baths, swimming, or hot tubs until incisions are healed.  No driving while taking narcotics.  May resume home medications.  Follow up in office in one week.  Call for concerns.    DIET: To avoid nausea, slowly advance diet as tolerated, avoiding spicy or greasy foods for the first day.  Add more substantial food to your diet according to your physician's instructions.  Babies can be fed formula or breast milk as soon as they are hungry.  INCREASE FLUIDS AND FIBER TO AVOID CONSTIPATION.    SURGICAL DRESSING/BATHING: see above    FOLLOW-UP APPOINTMENT:  A follow-up appointment should be arranged with your doctor in 1-2 weeks; call to schedule.    You should CALL YOUR PHYSICIAN if you develop:  Fever greater than 101 degrees F.  Pain not relieved by medication, or persistent nausea or vomiting.  Excessive bleeding (blood soaking through dressing) or unexpected drainage from the wound.  Extreme redness or swelling around the incision site, drainage of pus or foul smelling drainage.  Inability to urinate or empty your bladder within 8 hours.  Problems with breathing or chest pain.    You should call 911 if you develop problems with breathing or chest pain.  If you are unable to contact your doctor or surgical center, you should go to the nearest emergency room  or urgent care center.  Physician's telephone #: [phone]    If any questions arise, call your doctor.  If your doctor is not available, please feel free to call the Surgical Center at [phone].  The Center is open Monday through Friday from 7AM to 7PM.  You can also call the HEALTH HOTLINE open 24 hours/day, 7 days/week and speak to a nurse at [phone], or toll free at [phone].    A registered nurse may call you a few days after your surgery to see how you are doing after your procedure.    MEDICATIONS: Resume taking daily medication.  Take prescribed pain medication with food.  If no medication is prescribed, you may take non-aspirin pain medication if needed.  PAIN MEDICATION CAN BE VERY CONSTIPATING.  Take a stool softener or laxative such as senokot, pericolace, or milk of magnesia if needed.    Prescription given for oxycodone, zofran.  Last pain medication given at 3:05    If your physician has prescribed pain medication that includes Acetaminophen (Tylenol), do not take additional Acetaminophen (Tylenol) while taking the prescribed medication.    Depression / Suicide Risk    As you are discharged from this [facility] facility, it is important to learn how to keep safe from harming yourself.    Recognize the warning signs:  · Abrupt changes in personality, positive or negative- including increase in energy   · Giving away possessions  · Change in eating patterns- significant weight changes-  positive or negative  · Change in sleeping patterns- unable to sleep or sleeping all the time   · Unwillingness or inability to communicate  · Depression  · Unusual sadness, discouragement and loneliness  · Talk of wanting to die  · Neglect of personal appearance   · Rebelliousness- reckless behavior  · Withdrawal from people/activities they love  · Confusion- inability to concentrate     If you or a loved one observes any of these behaviors or has concerns about self-harm, here's what you can  do:  · Talk about it- your feelings and reasons for harming yourself  · Remove any means that you might use to hurt yourself (examples: pills, rope, extension cords, firearm)  · Get professional help from the community (Mental Health, Substance Abuse, psychological counseling)  · Do not be alone:Call your Safe Contact- someone whom you trust who will be there for you.  · Call your local CRISIS HOTLINE 963-4291 or 688-458-7865  · Call your local Children's Mobile Crisis Response Team [place] (471) 446-3093 or www.Days [employer] Wonder  · Call the toll free National Suicide Prevention Hotlines   · National Suicide Prevention Lifeline 349-627-UGEP (1891)  · National Hope Line Network 800-SUICIDE (951-3484)

## 2017-10-18 NOTE — OR NURSING
VSS. Oximetry WNL.  No drainage from abd incision - open to air. Pain sari, denies nausea.  Report to [facility], PACU 2

## 2017-10-18 NOTE — PROGRESS NOTES
Pt and spouse educated on dc instructions, pt reports pain is tolerable, drsg dermabond, cdi, ice pack on

## 2017-11-14 ENCOUNTER — OFFICE VISIT (OUTPATIENT)
Dept: BEHAVIORAL HEALTH | Facility: PHYSICIAN GROUP | Age: 42
End: 2017-11-14
Payer: COMMERCIAL

## 2017-11-14 VITALS
DIASTOLIC BLOOD PRESSURE: 82 MMHG | RESPIRATION RATE: 14 BRPM | TEMPERATURE: 97.9 F | WEIGHT: 268 LBS | BODY MASS INDEX: 34.39 KG/M2 | SYSTOLIC BLOOD PRESSURE: 130 MMHG | HEIGHT: 74 IN | HEART RATE: 70 BPM

## 2017-11-14 DIAGNOSIS — F31.9 BIPOLAR 1 DISORDER, DEPRESSED (HCC): ICD-10-CM

## 2017-11-14 PROCEDURE — 90833 PSYTX W PT W E/M 30 MIN: CPT | Performed by: PSYCHIATRY & NEUROLOGY

## 2017-11-14 PROCEDURE — 99213 OFFICE O/P EST LOW 20 MIN: CPT | Performed by: PSYCHIATRY & NEUROLOGY

## 2017-11-14 RX ORDER — BUPROPION HYDROCHLORIDE 150 MG/1
150 TABLET, EXTENDED RELEASE ORAL 2 TIMES DAILY
Qty: 60 TAB | Refills: 5 | Status: SHIPPED | OUTPATIENT
Start: 2017-11-14 | End: 2020-09-30

## 2017-11-14 ASSESSMENT — PATIENT HEALTH QUESTIONNAIRE - PHQ9
5. POOR APPETITE OR OVEREATING: 1
3. TROUBLE FALLING OR STAYING ASLEEP OR SLEEPING TOO MUCH: 2
7. TROUBLE CONCENTRATING ON THINGS, SUCH AS READING THE NEWSPAPER OR WATCHING TELEVISION: 1
4. FEELING TIRED OR HAVING LITTLE ENERGY: 1
1. LITTLE INTEREST OR PLEASURE IN DOING THINGS: 1
SUM OF ALL RESPONSES TO PHQ9 QUESTIONS 1 AND 2: 2
2. FEELING DOWN, DEPRESSED, IRRITABLE, OR HOPELESS: 1
8. MOVING OR SPEAKING SO SLOWLY THAT OTHER PEOPLE COULD HAVE NOTICED. OR THE OPPOSITE, BEING SO FIGETY OR RESTLESS THAT YOU HAVE BEEN MOVING AROUND A LOT MORE THAN USUAL: 1
6. FEELING BAD ABOUT YOURSELF - OR THAT YOU ARE A FAILURE OR HAVE LET YOURSELF OR YOUR FAMILY DOWN: 2
SUM OF ALL RESPONSES TO PHQ QUESTIONS 1-9: 10
9. THOUGHTS THAT YOU WOULD BE BETTER OFF DEAD, OR OF HURTING YOURSELF: 0

## 2017-11-14 NOTE — PROGRESS NOTES
"[facility]  PSYCHIATRIC FOLLOW-UP NOTE    Name: Chaitanya Kelly  MRN: [id number]  : 1975  Age: 42 y.o.  Date of assessment: 2017  PCP: Ignacio Castillo M.D.  Persons in attendance: Patient  REASON FOR VISIT/CHIEF COMPLAINT (as stated by Patient):  Chaitanya Kelly is a 42 y.o., White male, attending follow-up appointment for   Chief Complaint   Patient presents with   • Medication Management     I had hernia repair and going back to work tomorrow.    .      HISTORY OF PRESENT ILLNESS:    This is 43 yo male, , employed, seen today for follow up. The patient had hernia repair last month and he did lab work. The patient has been of from work and he is going back to work tomorrow. The patient reported anxiety but denied depression. The patient is back on to bupropion  mg two a day and that is helping him with depression. The patient is taking lithium and lamotrigine every day. The patient mood has been stable and denied depression.    PSYCHOSOCIAL CHANGES SINCE PREVIOUS CONTACT:  Denied hypomania and depression.    RESPONSE TO TREATMENT:  Lithium  mg BID, lamotrigine 100 mg per day  Bupropion  mg two a day.    MEDICATION SIDE EFFECTS:  Denied.    REVIEW OF SYSTEMS:        Constitutional positive - obesity   Eyes negative   Ears/Nose/Mouth/Throat negative   Cardiovascular negative   Respiratory positive - obstructive sleep apnea   Gastrointestinal positive - recent hernia repair.   Genitourinary negative   Muscular negative   Integumentary negative   Neurological negative   Endocrine positive - hyperlipidemia   Hematologic/Lymphatic negative       PSYCHIATRIC EXAMINATION/MENTAL STATUS  /82   Pulse 70   Temp 36.6 °C (97.9 °F)   Resp 14   Ht 1.88 m (6' 2.02\")   Wt 121.6 kg (268 lb)   BMI 34.39 kg/m²   Participation: Active verbal participation, Attentive and Engaged  Grooming:Neat  Orientation: Alert and Fully Oriented  Eye contact: Good  Behavior:Calm "   Mood: Anxious  Affect: Flexible and Full range  Thought process: Logical and Goal-directed  Thought content:  Within normal limits  Speech: Rate within normal limits and Volume within normal limits  Perception:  Within normal limits  Memory:  No gross evidence of memory deficits  Insight: Good  Judgment: Good  Family/couple interaction observations:   Other:    Current risk:    Suicide: Low   Homicide: Low   Self-harm: Low  Relapse: Low  Other:   Crisis Safety Plan reviewed?Yes  If evidence of imminent risk is present, intervention/plan:    Medical Records/Labs/Diagnostic Tests Reviewed:   The patient had CBC, Comp panel done and it is unremarkable.    Medical Records/Labs/Diagnostic Tests Ordered: None.    DIAGNOSTIC IMPRESSION(S):  1. Bipolar 1 disorder, depressed (CMS-[facility])           ASSESSMENT AND PLAN:    1. Bipolar 1, depressed.  Bupropion  mg two a day  Lithium  mg BID.  Lamotrigine 100 mg per day.    2. Follow up in three months.    16 minutes were spent in psychotherapy.  (If greater than 16 minutes, add 23424 code)   Topics addressed in psychotherapy include:  The patient is improving his self esteem.  The patient is able to identify his negative automatic thoughts and improve it.  The patient is using guided meditation and mindfulness.  Cognitive restructuring and behavioral changes.  Matthew Snyder M.D.

## 2018-01-04 ENCOUNTER — OFFICE VISIT (OUTPATIENT)
Dept: URGENT CARE | Facility: PHYSICIAN GROUP | Age: 43
End: 2018-01-04
Payer: COMMERCIAL

## 2018-01-04 VITALS
HEART RATE: 75 BPM | BODY MASS INDEX: 35.94 KG/M2 | WEIGHT: 280 LBS | RESPIRATION RATE: 14 BRPM | TEMPERATURE: 98.2 F | HEIGHT: 74 IN | DIASTOLIC BLOOD PRESSURE: 90 MMHG | OXYGEN SATURATION: 97 % | SYSTOLIC BLOOD PRESSURE: 144 MMHG

## 2018-01-04 DIAGNOSIS — J01.40 ACUTE PANSINUSITIS, RECURRENCE NOT SPECIFIED: ICD-10-CM

## 2018-01-04 PROCEDURE — 99214 OFFICE O/P EST MOD 30 MIN: CPT | Performed by: PHYSICIAN ASSISTANT

## 2018-01-04 RX ORDER — AMOXICILLIN AND CLAVULANATE POTASSIUM 875; 125 MG/1; MG/1
1 TABLET, FILM COATED ORAL 2 TIMES DAILY
Qty: 14 TAB | Refills: 0 | Status: SHIPPED | OUTPATIENT
Start: 2018-01-04 | End: 2018-01-11

## 2018-01-04 ASSESSMENT — ENCOUNTER SYMPTOMS
HEADACHES: 1
CHILLS: 1
NECK PAIN: 0
ABDOMINAL PAIN: 0
WHEEZING: 0
MYALGIAS: 0
DIARRHEA: 0
SINUS PRESSURE: 1
TINGLING: 0
FEVER: 0
EYE REDNESS: 0
SORE THROAT: 1
VOMITING: 0
DIZZINESS: 0
HOARSE VOICE: 1
COUGH: 0
EYE DISCHARGE: 0
SINUS PAIN: 1

## 2018-01-05 NOTE — PROGRESS NOTES
"Subjective:      Chaitanya Kelly is a 42 y.o. male who presents with Pharyngitis (sinus drainge, sore throat x1 week )            Sinusitis   This is a new problem. Episode onset: 9-10 days ago. The problem has been gradually worsening since onset. Maximum temperature: Subjective fevers. His pain is at a severity of 4/10. The pain is moderate. Associated symptoms include chills, congestion, ear pain, headaches, a hoarse voice, sinus pressure and a sore throat. Pertinent negatives include no coughing or neck pain. Treatments tried: Tylenol. The treatment provided moderate relief.       Review of Systems   Constitutional: Positive for chills and malaise/fatigue. Negative for fever.   HENT: Positive for congestion, ear pain, hoarse voice, sinus pain, sinus pressure and sore throat. Negative for ear discharge.         Pos. For ear pressure     Eyes: Negative for discharge and redness.   Respiratory: Negative for cough and wheezing.    Cardiovascular: Negative for chest pain and leg swelling.   Gastrointestinal: Negative for abdominal pain, diarrhea and vomiting.   Genitourinary: Negative for dysuria and urgency.   Musculoskeletal: Negative for myalgias and neck pain.   Skin: Negative for itching and rash.   Neurological: Positive for headaches. Negative for dizziness and tingling.          Objective:     /90   Pulse 75   Temp 36.8 °C (98.2 °F)   Resp 14   Ht 1.88 m (6' 2\")   Wt (!) 127 kg (280 lb)   SpO2 97%   BMI 35.95 kg/m²    PMH:  has a past medical history of ASTHMA; Bipolar 2 disorder; Bipolar disorder (CMS-HCC); Headache(784.0); Pain (10/10/2017); and Seizure (CMS-HCC) (1980).  MEDS:   Current Outpatient Prescriptions:   •  amoxicillin-clavulanate (AUGMENTIN) 875-125 MG Tab, Take 1 Tab by mouth 2 times a day for 7 days., Disp: 14 Tab, Rfl: 0  •  lamotrigine (LAMICTAL) 100 MG Tab, TAKE ONE TABLET BY MOUTH ONCE DAILY IN THE MORNING, Disp: 30 Tab, Rfl: 2  •  lithium CR (ESKALITH CR) 450 MG Tab CR, " TAKE ONE TABLET BY MOUTH TWICE DAILY **NEED  FOLLOW  UP**, Disp: 60 Tab, Rfl: 2  •  buPROPion SR (WELLBUTRIN-SR) 150 MG TABLET SR 12 HR sustained-release tablet, TAKE ONE TABLET BY MOUTH TWICE DAILY, Disp: 60 Tab, Rfl: 2  •  buPROPion SR (WELLBUTRIN-SR) 150 MG TABLET SR 12 HR sustained-release tablet, Take 1 Tab by mouth 2 times a day., Disp: 60 Tab, Rfl: 5  •  oxycodone immediate-release (ROXICODONE) 5 MG Tab, Take 1 Tab by mouth every four hours as needed for Severe Pain., Disp: 30 Tab, Rfl: 0  •  sumatriptan (IMITREX) 50 MG Tab, Take 1 Tab by mouth Once PRN for Migraine for up to 1 dose., Disp: 10 Tab, Rfl: 3  ALLERGIES:   Allergies   Allergen Reactions   • Ibuprofen Unspecified     Interaction with Lithium   • Latex Itching   • Naprosyn [Naproxen] Unspecified     Interaction with Lithium     SURGHX:   Past Surgical History:   Procedure Laterality Date   • UMBILICAL HERNIA REPAIR N/A 10/18/2017    Procedure: UMBILICAL HERNIA REPAIR;  Surgeon: Chinedu Kamara M.D.;  Location: SURGERY [facility];  Service: General     SOCHX:  reports that he quit smoking about 11 years ago. He has never used smokeless tobacco. He reports that he does not drink alcohol or use drugs.  FH: Family history was reviewed, no pertinent findings to report    Physical Exam   Constitutional: He is oriented to person, place, and time. He appears well-developed and well-nourished.   HENT:   Head: Normocephalic and atraumatic.   Mouth/Throat: No oropharyngeal exudate.   Bilateral clear effusions- without bulge or erythema to the TM.   Posterior oropharynx with pos. PND without tonsillar edema or erythema.   Nose- boggy turbinates with mild-moderate amount of nasal discharge. Bilateral maxillary/Frontal  sinus tenderness with percussion.    Eyes: EOM are normal. Pupils are equal, round, and reactive to light.   Neck: Normal range of motion. Neck supple.   Cardiovascular: Normal rate and regular rhythm.    Pulmonary/Chest: Effort normal and  breath sounds normal. No respiratory distress. He has no wheezes.   Musculoskeletal: Normal range of motion. He exhibits no edema.   Lymphadenopathy:     He has no cervical adenopathy.   Neurological: He is alert and oriented to person, place, and time.   Skin: Skin is warm. No rash noted.   Psychiatric: He has a normal mood and affect. His behavior is normal.   Vitals reviewed.              Assessment/Plan:     1. Acute pansinusitis, recurrence not specified  - amoxicillin-clavulanate (AUGMENTIN) 875-125 MG Tab; Take 1 Tab by mouth 2 times a day for 7 days.  Dispense: 14 Tab; Refill: 0    Due to duration of symptoms, sinus tenderness, and failure of OTC therapies- ABX was written to tx. For bacterial etiology for sinusitis.   Continue OTC supportive therapies- Flonase, OTC allergy meds, avoid night time dairy. Increase fluids. Humidification.   Patient given precautionary s/sx that mandate immediate follow up and evaluation in the ED. Advised of risks of not doing so.    DDX, Supportive care, and indications for immediate follow-up discussed with patient.    Instructed to return to clinic or nearest emergency department if we are not available for any change in condition, further concerns, or worsening of symptoms.    The patient demonstrated a good understanding and agreed with the treatment plan

## 2018-02-13 ENCOUNTER — OFFICE VISIT (OUTPATIENT)
Dept: BEHAVIORAL HEALTH | Facility: PHYSICIAN GROUP | Age: 43
End: 2018-02-13
Payer: COMMERCIAL

## 2018-02-13 VITALS
RESPIRATION RATE: 16 BRPM | TEMPERATURE: 98.2 F | HEART RATE: 70 BPM | HEIGHT: 74 IN | SYSTOLIC BLOOD PRESSURE: 138 MMHG | DIASTOLIC BLOOD PRESSURE: 82 MMHG | WEIGHT: 280 LBS | BODY MASS INDEX: 35.94 KG/M2

## 2018-02-13 DIAGNOSIS — E78.5 HYPERLIPIDEMIA, UNSPECIFIED HYPERLIPIDEMIA TYPE: ICD-10-CM

## 2018-02-13 DIAGNOSIS — F31.9 BIPOLAR 1 DISORDER, DEPRESSED (HCC): ICD-10-CM

## 2018-02-13 PROCEDURE — 90833 PSYTX W PT W E/M 30 MIN: CPT | Performed by: PSYCHIATRY & NEUROLOGY

## 2018-02-13 PROCEDURE — 99213 OFFICE O/P EST LOW 20 MIN: CPT | Performed by: PSYCHIATRY & NEUROLOGY

## 2018-02-13 RX ORDER — LAMOTRIGINE 100 MG/1
TABLET ORAL
Qty: 90 TAB | Refills: 1 | Status: SHIPPED | OUTPATIENT
Start: 2018-02-13 | End: 2018-08-29 | Stop reason: SDUPTHER

## 2018-02-13 RX ORDER — LITHIUM CARBONATE 450 MG
TABLET, EXTENDED RELEASE ORAL
Qty: 180 TAB | Refills: 1 | Status: SHIPPED | OUTPATIENT
Start: 2018-02-13 | End: 2018-08-29 | Stop reason: SDUPTHER

## 2018-02-13 RX ORDER — BUPROPION HYDROCHLORIDE 150 MG/1
TABLET, EXTENDED RELEASE ORAL
Qty: 180 TAB | Refills: 1 | Status: SHIPPED | OUTPATIENT
Start: 2018-02-13 | End: 2018-08-29 | Stop reason: SDUPTHER

## 2018-02-13 ASSESSMENT — PATIENT HEALTH QUESTIONNAIRE - PHQ9
1. LITTLE INTEREST OR PLEASURE IN DOING THINGS: 0
2. FEELING DOWN, DEPRESSED, IRRITABLE, OR HOPELESS: 0
SUM OF ALL RESPONSES TO PHQ9 QUESTIONS 1 AND 2: 0

## 2018-02-13 NOTE — PROGRESS NOTES
"[facility]  PSYCHIATRIC FOLLOW-UP NOTE    Name: Chaitanya Kelly  MRN: [id number]  : 1975  Age: 42 y.o.  Date of assessment: 2018  PCP: Ignacio Castillo M.D.  Persons in attendance: Patient  REASON FOR VISIT/CHIEF COMPLAINT (as stated by Patient):  Chaitanya Kelly is a 42 y.o., White male, attending follow-up appointment for   Chief Complaint   Patient presents with   • Medication Management     I am doing well and I have refills on my medications.   .      HISTORY OF PRESENT ILLNESS:    This is 43 yo male, , employed, seen today for follow up. The patient is doing well on his medications and he requested refills. The patients has  [facility] thorough his job and there is a provider change and [facility] does not take [facility]. The patient has been working at the same job for more than ten years. The patients dad  yearly in his life with heart trouble.    PSYCHOSOCIAL CHANGES SINCE PREVIOUS CONTACT:  The patent denied depression and his mood has been stable.    RESPONSE TO TREATMENT:  Lithium  mg bID, lamotrigine 100 mg one a day, Bupropion  mg BID.    MEDICATION SIDE EFFECTS:  dnied.    REVIEW OF SYSTEMS:        Constitutional positive - obesity   Eyes negative   Ears/Nose/Mouth/Throat negative   Cardiovascular negative   Respiratory positive - obstructive sleep apnea   Gastrointestinal positive - recent hernia repair   Genitourinary negative   Muscular negative   Integumentary negative   Neurological negative   Endocrine positive - hyperlipidemia.   Hematologic/Lymphatic negative       PSYCHIATRIC EXAMINATION/MENTAL STATUS  /82   Pulse 70   Temp 36.8 °C (98.2 °F)   Resp 16   Ht 1.88 m (6' 2.02\")   Wt (!) 127 kg (280 lb)   BMI 35.93 kg/m²   Participation: Active verbal participation, Attentive and Engaged  Grooming:Neat  Orientation: Alert and Fully Oriented  Eye contact: Good  Behavior:Calm   Mood: Anxious  Affect: Flexible and Full range  Thought " process: Logical and Goal-directed  Thought content:  Within normal limits  Speech: Rate within normal limits and Volume within normal limits  Perception:  Within normal limits  Memory:  No gross evidence of memory deficits  Insight: Good  Judgment: Adequate  Family/couple interaction observations:   Other:    Current risk:    Suicide: Low   Homicide: Low   Self-harm: Low  Relapse: Low  Other:   Crisis Safety Plan reviewed?Yes   :    Medical Records/Labs/Diagnostic Tests Reviewed: yes.    Medical Records/Labs/Diagnostic Tests Ordered:   Lipid panel, and Lithium level.    DIAGNOSTIC IMPRESSION(S):  1. Bipolar 1 disorder, depressed (CMS-HCC)    2. Hyperlipidemia, unspecified hyperlipidemia type           ASSESSMENT AND PLAN:    1. Bipolar 1 disorder, depressed.  Lithium  mg BID # 180 refills one.  Bupropion  mg BID  Lamotrigine 100 mg per day # 90 refills one.    2. Serum lithium level and Lipid panel  3. Follow up with his psychiatrist covered through his insurance.      16 minutes were spent in psychotherapy.  (If greater than 16 minutes, add [id number] code)   Topics addressed in psychotherapy include:  We focused on termination of treatment today.  The patient agreed to call his provider and make an appointment.  He has refills on his medication for six months.  Matthew Snyder M.D.

## 2018-08-29 ENCOUNTER — OFFICE VISIT (OUTPATIENT)
Dept: BEHAVIORAL HEALTH | Facility: PHYSICIAN GROUP | Age: 43
End: 2018-08-29

## 2018-08-29 VITALS
RESPIRATION RATE: 16 BRPM | WEIGHT: 276 LBS | BODY MASS INDEX: 35.42 KG/M2 | HEART RATE: 74 BPM | TEMPERATURE: 97.9 F | SYSTOLIC BLOOD PRESSURE: 134 MMHG | HEIGHT: 74 IN | DIASTOLIC BLOOD PRESSURE: 86 MMHG

## 2018-08-29 DIAGNOSIS — F31.9 BIPOLAR 1 DISORDER, DEPRESSED (HCC): ICD-10-CM

## 2018-08-29 PROCEDURE — 99213 OFFICE O/P EST LOW 20 MIN: CPT | Performed by: PSYCHIATRY & NEUROLOGY

## 2018-08-29 PROCEDURE — 90833 PSYTX W PT W E/M 30 MIN: CPT | Performed by: PSYCHIATRY & NEUROLOGY

## 2018-08-29 RX ORDER — BUPROPION HYDROCHLORIDE 150 MG/1
TABLET, EXTENDED RELEASE ORAL
Qty: 180 TAB | Refills: 3 | Status: SHIPPED | OUTPATIENT
Start: 2018-08-29 | End: 2019-08-10 | Stop reason: SDUPTHER

## 2018-08-29 RX ORDER — LITHIUM CARBONATE 450 MG
TABLET, EXTENDED RELEASE ORAL
Qty: 180 TAB | Refills: 1 | Status: SHIPPED | OUTPATIENT
Start: 2018-08-29 | End: 2019-02-28 | Stop reason: SDUPTHER

## 2018-08-29 RX ORDER — LAMOTRIGINE 100 MG/1
TABLET ORAL
Qty: 90 TAB | Refills: 3 | Status: SHIPPED | OUTPATIENT
Start: 2018-08-29 | End: 2019-08-10 | Stop reason: SDUPTHER

## 2018-08-29 ASSESSMENT — PATIENT HEALTH QUESTIONNAIRE - PHQ9
1. LITTLE INTEREST OR PLEASURE IN DOING THINGS: 1
2. FEELING DOWN, DEPRESSED, IRRITABLE, OR HOPELESS: 1
9. THOUGHTS THAT YOU WOULD BE BETTER OFF DEAD, OR OF HURTING YOURSELF: 0
SUM OF ALL RESPONSES TO PHQ9 QUESTIONS 1 AND 2: 2
SUM OF ALL RESPONSES TO PHQ QUESTIONS 1-9: 6
8. MOVING OR SPEAKING SO SLOWLY THAT OTHER PEOPLE COULD HAVE NOTICED. OR THE OPPOSITE, BEING SO FIGETY OR RESTLESS THAT YOU HAVE BEEN MOVING AROUND A LOT MORE THAN USUAL: 0
4. FEELING TIRED OR HAVING LITTLE ENERGY: 1
7. TROUBLE CONCENTRATING ON THINGS, SUCH AS READING THE NEWSPAPER OR WATCHING TELEVISION: 0
6. FEELING BAD ABOUT YOURSELF - OR THAT YOU ARE A FAILURE OR HAVE LET YOURSELF OR YOUR FAMILY DOWN: 1
5. POOR APPETITE OR OVEREATING: 1
3. TROUBLE FALLING OR STAYING ASLEEP OR SLEEPING TOO MUCH: 1

## 2018-08-29 NOTE — PROGRESS NOTES
"[facility]  PSYCHIATRIC FOLLOW-UP NOTE    Name: Chaitanya Kelly  MRN: [id number]  : 1975  Age: 42 y.o.  Date of assessment: 2018  PCP: Ignacio Castillo M.D.  Persons in attendance: Patient  REASON FOR VISIT/CHIEF COMPLAINT (as stated by Patient):  Chaitanya Kelly is a 42 y.o., White male, attending follow-up appointment for   Chief Complaint   Patient presents with   • Medication Management     My insurance changed but I will pay. I need refills on my medications. I am doing well on my medications.   .      HISTORY OF PRESENT ILLNESS:    This is 41 yo female, , employed, seen today for follow up after six months. The patient reported migraine and he has FMLA filled out. The patient reported that with severe headache he is unable to work. The patient reported financial stress and his wife is on disability for bipolar disorder.The patients last blood work was few months ago. The patient agreed to do another blood work. The patient is sleeping well and his mood has been stable.        PSYCHOSOCIAL CHANGES SINCE PREVIOUS CONTACT:  Denied depression and his mood has been stable.    RESPONSE TO TREATMENT:  Lithium  mg bid, lamotrigine 100 mg every day, bupropion  mg two a day.    MEDICATION SIDE EFFECTS:  Denied.    REVIEW OF SYSTEMS:        Constitutional positive - obesity   Eyes negative   Ears/Nose/Mouth/Throat negative   Cardiovascular negative   Respiratory positive - obstructive sleep apnea.   Gastrointestinal positive - recent hernia repair.   Genitourinary negative   Muscular negative   Integumentary negative   Neurological negative   Endocrine positive - hyperlipidemia.   Hematologic/Lymphatic negative       PSYCHIATRIC EXAMINATION/MENTAL STATUS  /86   Pulse 74   Temp 36.6 °C (97.9 °F)   Resp 16   Ht 1.88 m (6' 2.02\")   Wt (!) 125.2 kg (276 lb)   BMI 35.42 kg/m²   Participation: Active verbal participation, Attentive and " Engaged  Grooming:Casual  Orientation: Alert and Fully Oriented  Eye contact: Good  Behavior:Calm   Mood: Anxious  Affect: Anxious  Thought process: Logical and Goal-directed  Thought content:  Within normal limits  Speech: Rate within normal limits and Volume within normal limits  Perception:  Within normal limits  Memory:  No gross evidence of memory deficits  Insight: Good  Judgment: Good  Family/couple interaction observations:   Other:    Current risk:    Suicide: Low   Homicide: Low   Self-harm: Low  Relapse: Moderate  Other:   Crisis Safety Plan reviewed?Yes  If evidence of imminent risk is present, intervention/plan:    Medical Records/Labs/Diagnostic Tests Reviewed: yes.    Medical Records/Labs/Diagnostic Tests Ordered:   CBC, Comp Panel, Lithium level, and TSH.    DIAGNOSTIC IMPRESSION(S):  1. Bipolar 1 disorder, depressed (HCC)           ASSESSMENT AND PLAN:    1. Bipolar 1 disorder depressed.  Lithium  mg BID # 180 refills one.  Lamotrigine 90 refills three.  Bupropion  mg two a day # 180 refills three.    2. CBC, Comp Panel, TSH, and Lithium level in few weeks.    3. Follow up in six months.     16 minutes were spent in psychotherapy.  (If greater than 16 minutes, add 29013 code)   Topics addressed in psychotherapy include:  We discussed his unresolved emotional issues and helped him with some emotional skills.  Psycho education and cognitive clarifications.  Agreed to keep his daily routine and a good sleep cycle.  Matthew Snyder M.D.

## 2019-02-28 RX ORDER — LITHIUM CARBONATE 450 MG
TABLET, EXTENDED RELEASE ORAL
Qty: 60 TAB | Refills: 1 | Status: SHIPPED | OUTPATIENT
Start: 2019-02-28 | End: 2019-05-07 | Stop reason: SDUPTHER

## 2019-05-07 RX ORDER — LITHIUM CARBONATE 450 MG
TABLET, EXTENDED RELEASE ORAL
Qty: 180 TAB | Refills: 0 | Status: SHIPPED | OUTPATIENT
Start: 2019-05-07 | End: 2019-05-15 | Stop reason: SDUPTHER

## 2019-05-15 ENCOUNTER — OFFICE VISIT (OUTPATIENT)
Dept: BEHAVIORAL HEALTH | Facility: CLINIC | Age: 44
End: 2019-05-15

## 2019-05-15 VITALS
SYSTOLIC BLOOD PRESSURE: 132 MMHG | HEART RATE: 76 BPM | BODY MASS INDEX: 35.94 KG/M2 | RESPIRATION RATE: 16 BRPM | DIASTOLIC BLOOD PRESSURE: 82 MMHG | HEIGHT: 74 IN | WEIGHT: 280 LBS

## 2019-05-15 DIAGNOSIS — F31.9 BIPOLAR 1 DISORDER, DEPRESSED (HCC): ICD-10-CM

## 2019-05-15 PROCEDURE — 99213 OFFICE O/P EST LOW 20 MIN: CPT | Performed by: PSYCHIATRY & NEUROLOGY

## 2019-05-15 PROCEDURE — 90833 PSYTX W PT W E/M 30 MIN: CPT | Performed by: PSYCHIATRY & NEUROLOGY

## 2019-05-15 RX ORDER — LITHIUM CARBONATE 450 MG
TABLET, EXTENDED RELEASE ORAL
Qty: 180 TAB | Refills: 1 | Status: SHIPPED | OUTPATIENT
Start: 2019-05-15

## 2019-05-15 ASSESSMENT — PATIENT HEALTH QUESTIONNAIRE - PHQ9
5. POOR APPETITE OR OVEREATING: 1 - SEVERAL DAYS
SUM OF ALL RESPONSES TO PHQ QUESTIONS 1-9: 7
CLINICAL INTERPRETATION OF PHQ2 SCORE: 2

## 2019-05-15 NOTE — BH THERAPY
"[facility]  PSYCHIATRIC FOLLOW-UP NOTE    Name: Chaitanya Kelly  MRN: [id number]  : 1975  Age: 43 y.o.  Date of assessment: 5/15/2019  PCP: Ignacio Castillo  Persons in attendance: Patient  Total face-to-face time: 30 minutes    REASON FOR VISIT/CHIEF COMPLAINT (as stated by Patient):  Chaitanya Kelly is a 43 y.o., White male, attending follow-up appointment for   Chief Complaint   Patient presents with   • Medication Management     The patient is seen today for follow up on his bipolar disorder.   .      HISTORY OF PRESENT ILLNESS:    This is a 44 yo male, , employed, seen today for follow up after 9 months. The patient was given lab slip on his last visit but he lost it. The patient reported increased stress at work and he is looking for another job. The patient has been working for [employer] for 11 years now. The patient has been taking his medications and he requested refills today.      PSYCHOSOCIAL CHANGES SINCE PREVIOUS CONTACT:  The patient reported that his mood has been stable and he denied hypomania.    RESPONSE TO TREATMENT:  He is taking Lithium  mg bid, lamotrigine 100 mg one a day, and bupropion  mg two a day.    MEDICATION SIDE EFFECTS:  Denied.    REVIEW OF SYSTEMS:        Constitutional positive - obesity   Eyes negative   Ears/Nose/Mouth/Throat negative   Cardiovascular negative   Respiratory positive - obstructive sleep apnea.   Gastrointestinal negative   Genitourinary negative   Muscular negative   Integumentary negative   Neurological negative   Endocrine positive - hyperlipidemia.   Hematologic/Lymphatic negative       PSYCHIATRIC EXAMINATION/MENTAL STATUS  /82   Pulse 76   Resp 16   Ht 1.88 m (6' 2.02\")   Wt (!) 127 kg (280 lb)   BMI 35.93 kg/m²   Participation: Active verbal participation, Attentive and Engaged  Grooming:Neat  Orientation: Alert and Fully Oriented  Eye contact: Good  Behavior:Calm   Mood: Anxious  Affect: Flexible and Full " range  Thought process: Logical and Goal-directed  Thought content:  Within normal limits  Speech: Rate within normal limits and Volume within normal limits  Perception:  Within normal limits  Memory:  No gross evidence of memory deficits  Insight: Good  Judgment: Good  Family/couple interaction observations:   Other:    Current risk:    Suicide: Low   Homicide: Low   Self-harm: Low  Relapse: Low  Other:   Crisis Safety Plan reviewed?Yes  If evidence of imminent risk is present, intervention/plan:    Medical Records/Labs/Diagnostic Tests Reviewed: yes.    Medical Records/Labs/Diagnostic Tests Ordered: none.    DIAGNOSTIC IMPRESSION(S):  1. Bipolar 1 disorder, depressed (HCC)           ASSESSMENT AND PLAN:  1. Bipolar 1 disorder, depressed.  Lamotrigine 100 mg one a day # 90 Refills three.  Lithium  mg bid #1 80 refills one.  Bupropion  mg two a day # 180 refills one.    2. CBC, Comp Panel, lithium level, and TSH.    3. Follow up with his psychiatrist covered through the insurance.  We discussed termination of treatment today.    17 minutes were spent in psychotherapy.  (If greater than 16 minutes, add 22529 code)   Topics addressed in psychotherapy include:  We discussed his negative automatic thoughts and helped him to process it.  Educated him about mood graph and he agreed to use it.  Cognitive restructuring and behavioral changes.   Matthew Snyder M.D.

## 2019-08-12 RX ORDER — LAMOTRIGINE 100 MG/1
TABLET ORAL
Qty: 90 TAB | Refills: 1 | Status: SHIPPED | OUTPATIENT
Start: 2019-08-12 | End: 2021-10-05

## 2019-08-12 RX ORDER — BUPROPION HYDROCHLORIDE 150 MG/1
TABLET, EXTENDED RELEASE ORAL
Qty: 180 TAB | Refills: 1 | Status: SHIPPED | OUTPATIENT
Start: 2019-08-12 | End: 2020-09-30

## 2020-07-06 ENCOUNTER — TELEPHONE (OUTPATIENT)
Dept: HEALTH INFORMATION MANAGEMENT | Facility: OTHER | Age: 45
End: 2020-07-06

## 2020-07-06 ENCOUNTER — TELEPHONE (OUTPATIENT)
Dept: SCHEDULING | Facility: IMAGING CENTER | Age: 45
End: 2020-07-06

## 2020-07-06 NOTE — TELEPHONE ENCOUNTER
1. Caller Name: Chaitanya Kelly                Call Back Number: [phone]  [facility] PCP or Specialty Provider: No          2.  In the last two weeks, has the patient had any new or worsening symptoms (not explained by alternative diagnosis)? Yes, the patient reports the following COVID-19 consistent symptoms: cough, shortness of breath or difficulty breathing and new loss of taste or smellPatient tested positive for covid on June 4.  His symptoms have resolved as of 7 days ago.    3.  Does patient have any comoribidities? None     4.  Has the patient traveled in the last 14 days OR had any known contact with someone who is suspected or confirmed to have COVID-19?  No.    5. Disposition: Offered patient virtual visit to limit potential exposure to others; patient also given self care instructions Patient offered virtual visit.  He would like to make an appt for one week out when he will be 14 days free of COVID symptoms.    Note routed to [facility] Provider: FYI only.

## 2020-07-14 ENCOUNTER — OFFICE VISIT (OUTPATIENT)
Dept: MEDICAL GROUP | Facility: IMAGING CENTER | Age: 45
End: 2020-07-14
Payer: COMMERCIAL

## 2020-07-14 VITALS
HEART RATE: 70 BPM | SYSTOLIC BLOOD PRESSURE: 150 MMHG | DIASTOLIC BLOOD PRESSURE: 88 MMHG | WEIGHT: 286 LBS | BODY MASS INDEX: 36.7 KG/M2 | RESPIRATION RATE: 16 BRPM | TEMPERATURE: 99.3 F | OXYGEN SATURATION: 97 % | HEIGHT: 74 IN

## 2020-07-14 DIAGNOSIS — J45.20 MILD INTERMITTENT ASTHMA WITHOUT COMPLICATION: ICD-10-CM

## 2020-07-14 DIAGNOSIS — R03.0 ELEVATED BLOOD PRESSURE READING: ICD-10-CM

## 2020-07-14 PROCEDURE — 99203 OFFICE O/P NEW LOW 30 MIN: CPT | Performed by: FAMILY MEDICINE

## 2020-07-14 RX ORDER — ACETAMINOPHEN 325 MG/1
650 TABLET ORAL EVERY 4 HOURS PRN
COMMUNITY
End: 2020-10-29

## 2020-07-14 RX ORDER — ALBUTEROL SULFATE 90 UG/1
2 AEROSOL, METERED RESPIRATORY (INHALATION) EVERY 4 HOURS PRN
Qty: 1 INHALER | Refills: 1 | Status: SHIPPED | OUTPATIENT
Start: 2020-07-14 | End: 2020-09-08

## 2020-07-14 RX ORDER — FLUTICASONE PROPIONATE 44 MCG
1 AEROSOL WITH ADAPTER (GRAM) INHALATION 2 TIMES DAILY
Qty: 1 INHALER | Refills: 1 | Status: SHIPPED | OUTPATIENT
Start: 2020-07-14 | End: 2020-09-08

## 2020-07-14 ASSESSMENT — ENCOUNTER SYMPTOMS
DOUBLE VISION: 0
CHILLS: 0
EYE PAIN: 0
NAUSEA: 0
HEADACHES: 0
DIARRHEA: 0
DIZZINESS: 0
MYALGIAS: 0
WHEEZING: 0
VOMITING: 0
ABDOMINAL PAIN: 0
COUGH: 0
PALPITATIONS: 0
FEVER: 0
SHORTNESS OF BREATH: 1

## 2020-07-14 ASSESSMENT — PAIN SCALES - GENERAL: PAINLEVEL: NO PAIN

## 2020-07-14 NOTE — PROGRESS NOTES
Chief Complaint   Patient presents with   • Establish Care   • Asthma     worse since having covid, easily winded     HPI:  45yo male with h/o covid dx 2020 and asthma, bipolor disorder here with sob. He has not needed an inhaler in years. He reports that his mask makes him more sob as work. He would like to wear the shield mask alone so he can breath. He reports that [employer], his employer, uses a third party to make these decisions and they are not in [place]. The company is [employer]. Patient denies wheeze or cough.   He follows with psychiatry for Wellbutrin and Lamictal and lithium.   Allergies   Allergen Reactions   • Ibuprofen Unspecified     Interaction with Lithium   • Latex Itching   • Naprosyn [Naproxen] Unspecified     Interaction with [place]     Current Outpatient Medications   Medication Sig Dispense Refill   • acetaminophen (TYLENOL) 325 MG Tab Take 650 mg by mouth every four hours as needed.     • lamoTRIgine (LAMICTAL) 100 MG Tab TAKE 1 TABLET BY MOUTH ONCE DAILY IN THE MORNING 90 Tab 1   • lithium CR (ESKALITH CR) 450 MG Tab CR TAKE 1 TABLET BY MOUTH TWICE DAILY 180 Tab 1   • buPROPion SR (WELLBUTRIN-SR) 150 MG TABLET SR 12 HR sustained-release tablet Take 1 Tab by mouth 2 times a day. 60 Tab 5   • buPROPion SR (WELLBUTRIN-SR) 150 MG TABLET SR 12 HR sustained-release tablet TAKE 1 TABLET BY MOUTH TWICE DAILY 180 Tab 1     No current facility-administered medications for this visit.      Social History     Tobacco Use   • Smoking status: Former Smoker     Types: Cigarettes     Last attempt to quit: 2006     Years since quittin.5   • Smokeless tobacco: Never Used   • Tobacco comment: off and on from age [age]   Substance Use Topics   • Alcohol use: No   • Drug use: No     Family History   Problem Relation Age of Onset   • Heart Disease Father 41        heart attack   • Heart Disease Paternal Aunt    • Heart Disease Paternal Uncle    • Stroke Paternal Uncle    • Heart Disease Paternal  "Grandfather        /88   Pulse 70   Temp 37.4 °C (99.3 °F)   Resp 16   Ht 1.88 m (6' 2\")   Wt (!) 129.7 kg (286 lb)   SpO2 97%  Body mass index is 36.72 kg/m².  Review of Systems   Constitutional: Negative for chills, fever and malaise/fatigue.   HENT: Negative for hearing loss and tinnitus.    Eyes: Negative for double vision and pain.   Respiratory: Positive for shortness of breath. Negative for cough and wheezing.    Cardiovascular: Negative for chest pain, palpitations and leg swelling.   Gastrointestinal: Negative for abdominal pain, diarrhea, nausea and vomiting.   Genitourinary: Negative for dysuria and frequency.   Musculoskeletal: Negative for joint pain and myalgias.   Skin: Negative for itching and rash.   Neurological: Negative for dizziness and headaches.     Physical Exam   Constitutional: He is well-developed, well-nourished, and in no distress. No distress.   HENT:   Head: Normocephalic and atraumatic.   Eyes: Pupils are equal, round, and reactive to light. Conjunctivae and EOM are normal. Right eye exhibits no discharge. Left eye exhibits no discharge. No scleral icterus.   Pulmonary/Chest: Effort normal. No respiratory distress.   Musculoskeletal:         General: No edema.   Neurological: He is alert.   Skin: Skin is warm and dry. He is not diaphoretic.   Psychiatric: Affect and judgment normal.       All labs (last 1 month):   No visits with results within 1 Month(s) from this visit.   Latest known visit with results is:   Orders Only on 10/10/2017   Component Date Value Ref Range Status   • WBC 10/10/2017 6.6  4.8 - 10.8 K/uL Final   • RBC 10/10/2017 5.16  4.70 - 6.10 M/uL Final   • Hemoglobin 10/10/2017 14.6  14.0 - 18.0 g/dL Final   • Hematocrit 10/10/2017 44.6  42.0 - 52.0 % Final   • MCV 10/10/2017 86.4  81.4 - 97.8 fL Final   • MCH 10/10/2017 28.3  27.0 - 33.0 pg Final   • MCHC 10/10/2017 32.7* 33.7 - 35.3 g/dL Final   • RDW 10/10/2017 43.8  35.9 - 50.0 fL Final   • Platelet " Count 10/10/2017 280  164 - 446 K/uL Final   • MPV 10/10/2017 9.7  9.0 - 12.9 fL Final   • Neutrophils-Polys 10/10/2017 69.40  44.00 - 72.00 % Final   • Lymphocytes 10/10/2017 21.10* 22.00 - 41.00 % Final   • Monocytes 10/10/2017 7.10  0.00 - 13.40 % Final   • Eosinophils 10/10/2017 1.80  0.00 - 6.90 % Final   • Basophils 10/10/2017 0.30  0.00 - 1.80 % Final   • Immature Granulocytes 10/10/2017 0.30  0.00 - 0.90 % Final   • Nucleated RBC 10/10/2017 0.00  /100 WBC Final   • Neutrophils (Absolute) 10/10/2017 4.56  1.82 - 7.42 K/uL Final    Includes immature neutrophils, if present.   • Lymphs (Absolute) 10/10/2017 1.39  1.00 - 4.80 K/uL Final   • Monos (Absolute) 10/10/2017 0.47  0.00 - 0.85 K/uL Final   • Eos (Absolute) 10/10/2017 0.12  0.00 - 0.51 K/uL Final   • Baso (Absolute) 10/10/2017 0.02  0.00 - 0.12 K/uL Final   • Immature Granulocytes (abs) 10/10/2017 0.02  0.00 - 0.11 K/uL Final   • NRBC (Absolute) 10/10/2017 0.00  K/uL Final   • Sodium 10/10/2017 134* 135 - 145 mmol/L Final   • Potassium 10/10/2017 3.8  3.6 - 5.5 mmol/L Final   • Chloride 10/10/2017 105  96 - 112 mmol/L Final   • Co2 10/10/2017 24  20 - 33 mmol/L Final   • Anion Gap 10/10/2017 5.0  0.0 - 11.9 Final   • Glucose 10/10/2017 94  65 - 99 mg/dL Final   • Bun 10/10/2017 16  8 - 22 mg/dL Final   • Creatinine 10/10/2017 0.92  0.50 - 1.40 mg/dL Final   • Calcium 10/10/2017 9.6  8.5 - 10.5 mg/dL Final   • AST(SGOT) 10/10/2017 18  12 - 45 U/L Final   • ALT(SGPT) 10/10/2017 25  2 - 50 U/L Final   • Alkaline Phosphatase 10/10/2017 72  30 - 99 U/L Final   • Total Bilirubin 10/10/2017 0.8  0.1 - 1.5 mg/dL Final   • Albumin 10/10/2017 4.5  3.2 - 4.9 g/dL Final   • Total Protein 10/10/2017 6.8  6.0 - 8.2 g/dL Final   • Globulin 10/10/2017 2.3  1.9 - 3.5 g/dL Final   • A-G Ratio 10/10/2017 2.0  g/dL Final   • GFR If  10/10/2017 >60  >60 mL/min/1.73 m 2 Final   • GFR If Non  10/10/2017 >60  >60 mL/min/1.73 m 2 Final        Lipids:   Lab Results   Component Value Date/Time    CHOLSTRLTOT 180 01/29/2014 10:07 AM    TRIGLYCERIDE 131 01/29/2014 10:07 AM    HDL 34 (A) 01/29/2014 10:07 AM     (H) 01/29/2014 10:07 AM       Imaging: No results found.    A/P  -Will provide letter given that he is working with a company not from [place]  -Patient will set up [facility]  Virtual visit one month  Return if symptoms worsen or fail to improve, for annual.VIRTUALLY     Problem List Items Addressed This Visit     Mild intermittent asthma without complication    Relevant Medications    fluticasone (FLOVENT HFA) 44 MCG/ACT Aerosol    albuterol 108 (90 Base) MCG/ACT Aero Soln inhalation aerosol        Portions of this note may be dictated using Dragon NaturallySpeaking voice recognition software.  Variances in spelling and vocabulary are possible and unintentional.  Not all areas may be caught/corrected.  Please notify me if any discrepancies are noted or if the meaning of any statement is not correct/clear.

## 2020-08-30 ENCOUNTER — OFFICE VISIT (OUTPATIENT)
Dept: URGENT CARE | Facility: PHYSICIAN GROUP | Age: 45
End: 2020-08-30
Payer: COMMERCIAL

## 2020-08-30 VITALS
BODY MASS INDEX: 38.43 KG/M2 | OXYGEN SATURATION: 99 % | TEMPERATURE: 98.1 F | SYSTOLIC BLOOD PRESSURE: 160 MMHG | RESPIRATION RATE: 16 BRPM | WEIGHT: 290 LBS | DIASTOLIC BLOOD PRESSURE: 98 MMHG | HEIGHT: 73 IN | HEART RATE: 80 BPM

## 2020-08-30 DIAGNOSIS — R11.0 NAUSEA: ICD-10-CM

## 2020-08-30 DIAGNOSIS — I10 HYPERTENSION, UNSPECIFIED TYPE: ICD-10-CM

## 2020-08-30 DIAGNOSIS — H81.13 BPPV (BENIGN PAROXYSMAL POSITIONAL VERTIGO), BILATERAL: ICD-10-CM

## 2020-08-30 PROCEDURE — 99214 OFFICE O/P EST MOD 30 MIN: CPT | Performed by: PHYSICIAN ASSISTANT

## 2020-08-30 RX ORDER — ONDANSETRON 4 MG/1
4 TABLET, FILM COATED ORAL EVERY 6 HOURS PRN
Qty: 20 TAB | Refills: 1 | Status: SHIPPED | OUTPATIENT
Start: 2020-08-30 | End: 2020-10-28

## 2020-08-30 RX ORDER — MECLIZINE HYDROCHLORIDE 25 MG/1
25 TABLET ORAL 3 TIMES DAILY PRN
Qty: 30 TAB | Refills: 0 | Status: SHIPPED | OUTPATIENT
Start: 2020-08-30 | End: 2020-09-08 | Stop reason: SDUPTHER

## 2020-08-30 ASSESSMENT — ENCOUNTER SYMPTOMS
WEAKNESS: 0
SPEECH CHANGE: 0
LOSS OF CONSCIOUSNESS: 0
SENSORY CHANGE: 0
BLURRED VISION: 0
DIZZINESS: 1
WHEEZING: 0
ABDOMINAL PAIN: 0
FOCAL WEAKNESS: 0
HEADACHES: 1
NAUSEA: 1
VOMITING: 0
FEVER: 0
SHORTNESS OF BREATH: 0
DIARRHEA: 0
CHILLS: 0
PALPITATIONS: 0

## 2020-08-30 NOTE — LETTER
August 30, 2020       Patient: Chaitanya Kelly   YOB: 1975   Date of Visit: 8/30/2020         To Whom It May Concern:    In my medical opinion, I recommend that Chaitanya Kelly should be excused from yesterday, today and tomorrow due to illness. Permitted to return after.        If you have any questions or concerns, please don't hesitate to call [phone]          Sincerely,          Mario Christensen P.A.-C.  Electronically Signed

## 2020-08-31 NOTE — PATIENT INSTRUCTIONS
How to Perform the Epley Maneuver  The Epley maneuver is an exercise that relieves symptoms of vertigo. Vertigo is the feeling that you or your surroundings are moving when they are not. When you feel vertigo, you may feel like the room is spinning and have trouble walking. Dizziness is a little different than vertigo. When you are dizzy, you may feel unsteady or light-headed.  You can do this maneuver at home whenever you have symptoms of vertigo. You can do it up to 3 times a day until your symptoms go away.  Even though the Epley maneuver may relieve your vertigo for a few weeks, it is possible that your symptoms will return. This maneuver relieves vertigo, but it does not relieve dizziness.  What are the risks?  If it is done correctly, the Epley maneuver is considered safe. Sometimes it can lead to dizziness or nausea that goes away after a short time. If you develop other symptoms, such as changes in vision, weakness, or numbness, stop doing the maneuver and call your health care provider.  How to perform the Epley maneuver  1. Sit on the edge of a bed or table with your back straight and your legs extended or hanging over the edge of the bed or table.  2. Turn your head detention toward the affected ear or side.  3. Lie backward quickly with your head turned until you are lying flat on your back. You may want to position a pillow under your shoulders.  4. Hold this position for 30 seconds. You may experience an attack of vertigo. This is normal.  5. Turn your head to the opposite direction until your unaffected ear is facing the floor.  6. Hold this position for 30 seconds. You may experience an attack of vertigo. This is normal. Hold this position until the vertigo stops.  7. Turn your whole body to the same side as your head. Hold for another 30 seconds.  8. Sit back up.  You can repeat this exercise up to 3 times a day.  Follow these instructions at home:  · After doing the Epley maneuver, you can return to  your normal activities.  · Ask your health care provider if there is anything you should do at home to prevent vertigo. He or she may recommend that you:  ? Keep your head raised (elevated) with two or more pillows while you sleep.  ? Do not sleep on the side of your affected ear.  ? Get up slowly from bed.  ? Avoid sudden movements during the day.  ? Avoid extreme head movement, like looking up or bending over.  Contact a health care provider if:  · Your vertigo gets worse.  · You have other symptoms, including:  ? Nausea.  ? Vomiting.  ? Headache.  Get help right away if:  · You have vision changes.  · You have a severe or worsening headache or neck pain.  · You cannot stop vomiting.  · You have new numbness or weakness in any part of your body.  Summary  · Vertigo is the feeling that you or your surroundings are moving when they are not.  · The Epley maneuver is an exercise that relieves symptoms of vertigo.  · If the Epley maneuver is done correctly, it is considered safe. You can do it up to 3 times a day.  This information is not intended to replace advice given to you by your health care provider. Make sure you discuss any questions you have with your health care provider.  Document Released: 12/23/2014 Document Revised: 11/30/2018 Document Reviewed: 11/07/2017  Elsevier Patient Education © 2020 [employer] Inc[employer]

## 2020-08-31 NOTE — PROGRESS NOTES
"Subjective:   Chaitanya Kelly  is a 44 y.o. male who presents for Dizziness (motion sickness x1day)      Dizziness  This is a new problem. The current episode started yesterday. Associated symptoms include headaches and nausea. Pertinent negatives include no abdominal pain, chest pain, chills, fever, rash, vomiting or weakness.       Patient comes clinic complaining of motion sickness x1 day.  Notes some dizziness with head motion history of vertigo.  Suspects as much.  Of note patient mentions he was positive for COVID did recover.  Chart review demonstrates saw primary care 6 weeks ago and discussed with them had been positive with COVID on June 2 and did have symptom resolution thereafter.    Today patient notes \"motion sickness\" with symptoms of brief dizziness that precedes bending over and standing back up or turning head rapidly.  Denies otherwise feeling sick denying chest pain palpitations or cough or shortness of breath.  Patient is found to be hypertensive and he notes he has been told that many times in the past.  He denies chest pain or palpitations.  He denies dizziness tinnitus visual changes, he denies regular headaches but notes he has had a mild headache today with dizziness.  Denies sore throat or ear pain.    Review of Systems   Constitutional: Negative for chills and fever.   HENT: Negative for tinnitus.    Eyes: Negative for blurred vision.   Respiratory: Negative for shortness of breath and wheezing.    Cardiovascular: Negative for chest pain, palpitations and leg swelling.   Gastrointestinal: Positive for nausea. Negative for abdominal pain, diarrhea and vomiting.   Skin: Negative for rash.   Neurological: Positive for dizziness ( ) and headaches. Negative for sensory change, speech change, focal weakness, loss of consciousness and weakness.       Allergies   Allergen Reactions   • Ibuprofen Unspecified     Interaction with Lithium   • Latex Itching   • Naprosyn [Naproxen] Unspecified     " "Interaction with Lithium        Objective:   /98   Pulse 80   Temp 36.7 °C (98.1 °F) (Temporal)   Resp 16   Ht 1.854 m (6' 1\")   Wt (!) 131.5 kg (290 lb)   SpO2 99%   BMI 38.26 kg/m²     Physical Exam  Vitals signs and nursing note reviewed.   Constitutional:       General: He is not in acute distress.     Appearance: He is well-developed. He is not diaphoretic.   HENT:      Head: Normocephalic and atraumatic.      Right Ear: Ear canal and external ear normal. Tympanic membrane is bulging. Tympanic membrane is not erythematous.      Left Ear: Ear canal and external ear normal. Tympanic membrane is bulging. Tympanic membrane is not erythematous.      Nose: Nose normal.   Eyes:      General: Lids are normal. No scleral icterus.        Right eye: No discharge.         Left eye: No discharge.      Extraocular Movements:      Right eye: Nystagmus present. Normal extraocular motion.      Left eye: Nystagmus present. Normal extraocular motion.      Conjunctiva/sclera: Conjunctivae normal.      Pupils: Pupils are equal, round, and reactive to light.   Neck:      Musculoskeletal: Normal range of motion and neck supple. No neck rigidity.   Cardiovascular:      Rate and Rhythm: Normal rate and regular rhythm.  No extrasystoles are present.     Pulses: Normal pulses.      Heart sounds: Normal heart sounds.   Pulmonary:      Effort: Pulmonary effort is normal. No respiratory distress.   Musculoskeletal: Normal range of motion.   Skin:     General: Skin is warm and dry.      Coloration: Skin is not pale.   Neurological:      Mental Status: He is alert and oriented to person, place, and time. He is not disoriented.      Cranial Nerves: No cranial nerve deficit.      Sensory: No sensory deficit.      Coordination: Coordination normal.      Comments: +Lai Hallpike         Assessment/Plan:   1. BPPV (benign paroxysmal positional vertigo), bilateral  - meclizine (ANTIVERT) 25 MG Tab; Take 1 Tab by mouth 3 times a day as " needed.  Dispense: 30 Tab; Refill: 0    2. Nausea  - ondansetron (ZOFRAN) 4 MG Tab tablet; Take 1 Tab by mouth every 6 hours as needed for Nausea/Vomiting.  Dispense: 20 Tab; Refill: 1    3. Hypertension, unspecified type  - REFERRAL TO FOLLOW-UP WITH PRIMARY CARE  Supportive care is reviewed with patient/caregiver - recommend to push PO fluids and electrolytes, Nsaids/tylenol, netti pot/saline irrig, humidifier in home, flonase, ER precautions with any worsening symptoms are reviewed with patient/caregiver and they do express understanding  Sent w/ work note  Referral to f/u w/ PCP for BP  Return to clinic with lack of resolution or progression of symptoms.      I have worn an N95 mask, gloves and eye protection for the entire encounter with this patient.     Differential diagnosis, natural history, supportive care, and indications for immediate follow-up discussed.

## 2020-09-08 ENCOUNTER — OFFICE VISIT (OUTPATIENT)
Dept: MEDICAL GROUP | Facility: LAB | Age: 45
End: 2020-09-08
Payer: COMMERCIAL

## 2020-09-08 VITALS
HEIGHT: 73 IN | OXYGEN SATURATION: 98 % | HEART RATE: 72 BPM | RESPIRATION RATE: 16 BRPM | DIASTOLIC BLOOD PRESSURE: 80 MMHG | BODY MASS INDEX: 37.37 KG/M2 | SYSTOLIC BLOOD PRESSURE: 148 MMHG | TEMPERATURE: 99.4 F | WEIGHT: 282 LBS

## 2020-09-08 DIAGNOSIS — E66.9 OBESITY (BMI 30-39.9): ICD-10-CM

## 2020-09-08 DIAGNOSIS — H81.13 BPPV (BENIGN PAROXYSMAL POSITIONAL VERTIGO), BILATERAL: ICD-10-CM

## 2020-09-08 DIAGNOSIS — F31.32 BIPOLAR 1 DISORDER, DEPRESSED, MODERATE (HCC): Chronic | ICD-10-CM

## 2020-09-08 DIAGNOSIS — Z82.49 FAMILY HISTORY OF EARLY CAD: ICD-10-CM

## 2020-09-08 DIAGNOSIS — G47.33 OBSTRUCTIVE SLEEP APNEA: ICD-10-CM

## 2020-09-08 DIAGNOSIS — R42 VERTIGO: Primary | ICD-10-CM

## 2020-09-08 DIAGNOSIS — R03.0 ELEVATED BLOOD PRESSURE READING: ICD-10-CM

## 2020-09-08 PROCEDURE — 99214 OFFICE O/P EST MOD 30 MIN: CPT | Performed by: PHYSICIAN ASSISTANT

## 2020-09-08 RX ORDER — MECLIZINE HYDROCHLORIDE 25 MG/1
25 TABLET ORAL 3 TIMES DAILY PRN
Qty: 30 TAB | Refills: 0 | Status: SHIPPED | OUTPATIENT
Start: 2020-09-08 | End: 2020-10-07 | Stop reason: SDUPTHER

## 2020-09-08 ASSESSMENT — ENCOUNTER SYMPTOMS
SPEECH CHANGE: 0
PHOTOPHOBIA: 0
LOSS OF CONSCIOUSNESS: 0
WEIGHT LOSS: 0
HEARTBURN: 0
FEVER: 0
SINUS PAIN: 0
HEADACHES: 1
CHILLS: 0
RESPIRATORY NEGATIVE: 1
SENSORY CHANGE: 0
FOCAL WEAKNESS: 0
SORE THROAT: 0
BLURRED VISION: 0
SEIZURES: 0
DIZZINESS: 1
DIAPHORESIS: 0
WEAKNESS: 0
TREMORS: 0
CARDIOVASCULAR NEGATIVE: 1
TINGLING: 0

## 2020-09-08 NOTE — ASSESSMENT & PLAN NOTE
New problem to me.  Has been seeing psychiatric virtually - out of [place].   Medications are controlling mood well.

## 2020-09-08 NOTE — ASSESSMENT & PLAN NOTE
New to me  Was seen in  08/30 for Vertigo.   Symptoms are worse with quick motions and quick head movements.     Denies sinus pain or pressure.  Does have a history of  Migraines 1-2x/week.   Mild headache a few days ago, though no migraines recently.   Ears feel full, b/l.   Does have some new rhinorrhea.   Does have seasonal allergies and environmental allergies.   No fevers/chiils.   Has had some nausea and vomiting related to vertigo.     Denies COVID symptoms.   Did have COVID 07/2020.

## 2020-09-08 NOTE — PROGRESS NOTES
Subjective:   CC: Chaitanya Kelly is a 44 y.o. male here today for evaluation and management of vertigo symptoms x2 weeks and to establish care with new provider.     HPI:  Vertigo  New to me  Was seen in  08/30 for Vertigo.   Symptoms are worse with quick motions and quick head movements.     Denies sinus pain or pressure.  Does have a history of  Migraines 1-2x/week.   Mild headache a few days ago, though no migraines recently.   Ears feel full, b/l.   Does have some new rhinorrhea.   Does have seasonal allergies and environmental allergies.   No fevers/chiils.   Has had some nausea and vomiting related to vertigo.     Denies COVID symptoms.   Did have COVID 07/2020.     Bipolar 1 disorder, depressed, moderate (CMS-HCC)  New problem to me.  Has been seeing psychiatric virtually - out of [place]   Medications are controlling mood well.       Obstructive sleep apnea  New to me.   Uses CPAP nightly.   Doing well with CPAP     Elevated blood pressure reading  New problem to me.   BP has improving from  reading  Not currently using BP medication at this time.          Allergies: Ibuprofen, Latex, and Naprosyn [naproxen]    Current medicines (including changes today)  Current Outpatient Medications   Medication Sig Dispense Refill   • meclizine (ANTIVERT) 25 MG Tab Take 1 Tab by mouth 3 times a day as needed. 30 Tab 0   • ondansetron (ZOFRAN) 4 MG Tab tablet Take 1 Tab by mouth every 6 hours as needed for Nausea/Vomiting. 20 Tab 1   • acetaminophen (TYLENOL) 325 MG Tab Take 650 mg by mouth every four hours as needed.     • lamoTRIgine (LAMICTAL) 100 MG Tab TAKE 1 TABLET BY MOUTH ONCE DAILY IN THE MORNING 90 Tab 1   • lithium CR (ESKALITH CR) 450 MG Tab CR TAKE 1 TABLET BY MOUTH TWICE DAILY 180 Tab 1   • buPROPion SR (WELLBUTRIN-SR) 150 MG TABLET SR 12 HR sustained-release tablet Take 1 Tab by mouth 2 times a day. 60 Tab 5   • buPROPion SR (WELLBUTRIN-SR) 150 MG TABLET SR 12 HR sustained-release tablet TAKE 1 TABLET  "BY MOUTH TWICE DAILY 180 Tab 1     No current facility-administered medications for this visit.      He  has a past medical history of Anxiety, ASTHMA, Bipolar 2 disorder, Bipolar disorder ([facility]), Headache(784.0), Migraine, Pain (10/10/2017), and Seizure ([facility]) (1980).    ROS   Review of Systems   Constitutional: Positive for malaise/fatigue. Negative for chills, diaphoresis, fever and weight loss.   HENT: Positive for congestion. Negative for ear discharge, ear pain, hearing loss, sinus pain, sore throat and tinnitus.    Eyes: Negative for blurred vision and photophobia.   Respiratory: Negative.    Cardiovascular: Negative.    Gastrointestinal: Negative for heartburn.   Skin: Negative for itching and rash.   Neurological: Positive for dizziness and headaches. Negative for tingling, tremors, sensory change, speech change, focal weakness, seizures, loss of consciousness and weakness.          Objective:   /80   Pulse 72   Temp 37.4 °C (99.4 °F)   Resp 16   Ht 1.854 m (6' 1\")   Wt (!) 127.9 kg (282 lb)   SpO2 98%  Body mass index is 37.21 kg/m².   Physical Exam:  Constitutional: Alert, no distress.  Skin: Warm, dry, good turgor, no rashes in visible areas.  Eye: Equal, round and reactive, conjunctiva clear, lids normal. No nystagmus, b/l.  ENMT: Lips without lesions, good dentition, oropharynx clear. TMs pearly gray bilaterally, small amount of cerumen, b/l. NOSE: Nasal turbinates are mildly edematous and erythematous b/l.  Neck: Trachea midline, no masses, no thyromegaly. No cervical or supraclavicular lymphadenopathy  Respiratory: Unlabored respiratory effort, lungs clear to auscultation, no wheezes, no ronchi.  Cardiovascular: Normal S1, S2, no murmur, no edema.  NEURO: CN 2-12 are intact. Good coordination, ambulating without difficulty.   Psych: Alert and oriented x3, normal affect and mood.        Assessment and Plan:   The following treatment plan was discussed    1. Vertigo  New problem to me; though " follow up from . NO red flags today.   Will continue Meclizine at this time  Encouraged to start Flonase and OTC antihistamine such as Claritin. Can reduce Meclizine use to QHS.   Continue to monitor.   Continue imaging vs. Neuro if symptoms persist given migraine history and known arachnoid cyst - MRI 10/2017.   - TSH WITH REFLEX TO FT4; Future  - VITAMIN D,25 HYDROXY; Future    2. Bipolar 1 disorder, depressed, moderate (HCC)  New to me, managed by Psychiatry  Well controlled on current regimen  No changes, continue to follow.   - TSH WITH REFLEX TO FT4; Future  - VITAMIN D,25 HYDROXY; Future  - LITHIUM; Future    3. Obstructive sleep apnea  New to me.   No changes to regimen  Continue to follow     4. Obesity (BMI 30-39.9)  New problem  Will check for Diabetes and cholesterol, especially given early CAD.  - CBC WITH DIFFERENTIAL; Future  - Comp Metabolic Panel; Future  - Lipid Profile; Future  - HEMOGLOBIN A1C; Future  - TESTOSTERONE F&T MALE ADULT; Future    5. Family history of early CAD  New to me.   Continue to monitor diet, will check in on labs.   - Comp Metabolic Panel; Future  - Lipid Profile; Future  - HEMOGLOBIN A1C; Future  - TESTOSTERONE F&T MALE ADULT; Future    6. BPPV (benign paroxysmal positional vertigo), bilateral  - See above.   - meclizine (ANTIVERT) 25 MG Tab; Take 1 Tab by mouth 3 times a day as needed.  Dispense: 30 Tab; Refill: 0    7. Elevated blood pressure reading  Low salt and weight loss   Monitor  Goal less than 140/90    Followup: Return in about 1 week (around 9/15/2020), or if symptoms worsen or fail to improve.       Cesilia Dotson P.A.-C.  Supervising MD: Dr. Holley Norton MD  09/08/20

## 2020-09-08 NOTE — ASSESSMENT & PLAN NOTE
New problem to me.   BP has improving from UC reading  Not currently using BP medication at this time.

## 2020-09-09 DIAGNOSIS — R42 VERTIGO: ICD-10-CM

## 2020-09-09 DIAGNOSIS — G93.0 BRAIN CYST: ICD-10-CM

## 2020-09-09 DIAGNOSIS — G44.52 NEW DAILY PERSISTENT HEADACHE: ICD-10-CM

## 2020-09-09 NOTE — PROGRESS NOTES
See pt message - worsening dizziness.    Chronic migraines and new persistent headache with intermittent tingling and numbness in UE (this was not present at yesterday's visit).     Nausea and vomiting present  History of COVID  History of cyst in brain seen on MRI 2017.     1. New daily persistent headache  MR-BRAIN-W/O   2. Vertigo  MR-BRAIN-W/O   3. Chronic migraine  MR-BRAIN-W/O   4. Brain cyst  MR-BRAIN-W/O     Cesilia Dotson P.A.-C.

## 2020-09-14 PROBLEM — K90.0 CELIAC DISEASE: Status: ACTIVE | Noted: 2020-09-14

## 2020-09-17 ENCOUNTER — TELEMEDICINE (OUTPATIENT)
Dept: MEDICAL GROUP | Facility: LAB | Age: 45
End: 2020-09-17
Payer: COMMERCIAL

## 2020-09-17 VITALS — BODY MASS INDEX: 37.11 KG/M2 | HEIGHT: 73 IN | WEIGHT: 280 LBS

## 2020-09-17 DIAGNOSIS — R42 VERTIGO: ICD-10-CM

## 2020-09-17 PROCEDURE — 99212 OFFICE O/P EST SF 10 MIN: CPT | Mod: 95,CR | Performed by: PHYSICIAN ASSISTANT

## 2020-09-17 NOTE — PROGRESS NOTES
This evaluation was conducted via Zoom using secure and encrypted videoconferencing technology. The patient was in a private location in the state of [place].    The patient's identity was confirmed and verbal consent was obtained for this virtual visit.    Subjective:     CC: Follow up on dizziness, vertigo   Chaitanya Kelly is a 44 y.o. male presenting to discuss the evaluation and management of dizziness and vertigo.     Vertigo  Follow up; improving  Pt reports minimal symptoms of dizziness in the past few days.Ttook 1 Meclazine yesterday; has not needed anything today. Reports that symptoms are worsened with quick movements with his head and only last for a few seconds, where as last week they were lasting much longer.     Continue to use daily anti-histamine and Flonase.   Intermittent nausea; none today.     Headache and diarrhea yesterday.   Though thinks it was food related.     Due for labs - scheduled 10/02/2020  Awaiting for pt to schedule MRI     ROS  See HPI  Constitutional: Negative for fever, chills and malaise/fatigue.   Respiratory: Negative for cough and shortness of breath.    Cardiovascular: Negative for leg swelling.   Skin: Negative for rash.  Neuro: +dizziness, improving; +headache, minimal currently.    Psychiatric/Behavioral: depression/Bipolar; stable.     Allergies   Allergen Reactions   • Ibuprofen Unspecified     Interaction with Lithium   • Latex Itching   • Naprosyn [Naproxen] Unspecified     Interaction with Lithium       Current medicines (including changes today)  Current Outpatient Medications   Medication Sig Dispense Refill   • acetaminophen (TYLENOL) 325 MG Tab Take 650 mg by mouth every four hours as needed.     • buPROPion SR (WELLBUTRIN-SR) 150 MG TABLET SR 12 HR sustained-release tablet Take 1 Tab by mouth 2 times a day. 60 Tab 5   • meclizine (ANTIVERT) 25 MG Tab Take 1 Tab by mouth 3 times a day as needed. 30 Tab 0   • ondansetron (ZOFRAN) 4 MG Tab tablet Take 1 Tab by  "mouth every 6 hours as needed for Nausea/Vomiting. 20 Tab 1   • buPROPion SR (WELLBUTRIN-SR) 150 MG TABLET SR 12 HR sustained-release tablet TAKE 1 TABLET BY MOUTH TWICE DAILY 180 Tab 1   • lamoTRIgine (LAMICTAL) 100 MG Tab TAKE 1 TABLET BY MOUTH ONCE DAILY IN THE MORNING 90 Tab 1   • lithium CR (ESKALITH CR) 450 MG Tab CR TAKE 1 TABLET BY MOUTH TWICE DAILY 180 Tab 1     No current facility-administered medications for this visit.        He  has a past medical history of Anxiety, ASTHMA, Bipolar 2 disorder, Bipolar disorder ([facility]), Headache(784.0), Migraine, Pain (10/10/2017), and Seizure ([facility]) ().  He  has a past surgical history that includes umbilical hernia repair (N/A, 10/18/2017).      Family History   Problem Relation Age of Onset   • Heart Disease Father 41        heart attack   • Heart Disease Paternal Aunt    • Heart Disease Paternal Uncle    • Stroke Paternal Uncle    • Heart Disease Paternal Grandfather    • Cancer Maternal Aunt    • Lung Cancer Maternal Aunt      Family Status   Relation Name Status   • Mo  Alive   • Fa     • PAunt  (Not Specified)   • PUnc  (Not Specified)   • PGFa  (Not Specified)   • MAunt  Alive   • MAunt         Patient Active Problem List    Diagnosis Date Noted   • Celiac disease 2020   • Vertigo 2020   • Elevated blood pressure reading 2020   • Umbilical hernia without obstruction or gangrene 10/18/2017   • Abdominal wall bulge 2017   • Mild intermittent asthma without complication 2016   • Other headache syndrome 2016   • Chronic fatigue 2016   • Bipolar 1 disorder, depressed, moderate ([facility]) 10/13/2016     Class: Chronic   • Neck pain 2016   • Obstructive sleep apnea 04/15/2016   • Dyslipidemia 2014   • Obesity (BMI 30-39.9) 2014   • Family history of early CAD 2014          Objective:   Ht 1.854 m (6' 1\")   Wt (!) 127 kg (280 lb)   BMI 36.94 kg/m²     Physical Exam:  Constitutional: Alert, " no distress, well-groomed.  Skin: No rashes in visible areas.  Eye: Round. Conjunctiva clear, lids normal. No icterus.   ENMT: Lips pink without lesions, good dentition, moist mucous membranes. Phonation normal.  Neck: No masses, no thyromegaly. Moves freely without pain.  CV: Pulse as reported by patient  Respiratory: Unlabored respiratory effort, no cough or audible wheeze  Psych: Alert and oriented x3, normal affect and mood.       Assessment and Plan:   The following treatment plan was discussed:     1. Vertigo  Condition is improving.   OK to continue antihistamine, Flonase and meclizine.   Pt to schedule Brain MRI, given history of brain cyst, chronic migraines with new, persistent dizziness and vertigo.  Close monitoring, discussed Neuro and or ENT referral if no improvement in the next 1-2 week.   Pt agrees with the plan.       Follow-up: Return if symptoms worsen or fail to improve, for Awating pt's labs results - will message via [employer] once available for review. .    KAYLEY LightAEDUARDO.  Supervising MD: Dr. Holley Norton MD  09/17/20

## 2020-09-17 NOTE — ASSESSMENT & PLAN NOTE
Follow up; improving  Pt reports minimal symptoms of dizziness in the past few days.Ttook 1 Meclazine yesterday; has not needed anything today. Reports that symptoms are worsened with quick movements with his head and only last for a few seconds, where as last week they were lasting much longer.     Continue to use daily anti-histamine and Flonase.   Intermittent nausea; none today.     Headache and diarrhea yesterday.   Though thinks it was food related.     Due for labs - scheduled 10/02/2020  Awaiting for pt to schedule MRI

## 2020-09-22 ENCOUNTER — TELEPHONE (OUTPATIENT)
Dept: MEDICAL GROUP | Facility: LAB | Age: 45
End: 2020-09-22

## 2020-09-22 NOTE — TELEPHONE ENCOUNTER
----- Message from Cesilia Dotson P.A.-C. sent at 9/22/2020 10:22 AM PDT -----  Regarding: FW: Non-Urgent Medical Question  Contact: [phone]  Updated forms are completed.     Cesilia Dotson P.A.-C.  ----- Message -----  From: Griselda Zaldivar, Med Ass't  Sent: 9/18/2020   1:54 PM PDT  To: Cesilia Dotson P.A.-C.  Subject: FW: Non-Urgent Medical Question                    ----- Message -----  From: Chaitanya Kelly  Sent: 9/18/2020   1:32 PM PDT  To: Yasmine Valladares Ma  Subject: Non-Urgent Medical Question                      Cathy said all you have to do is make changes to question #7 on the [facility] paperwork then initial, date and send it back to them. They said to change to more than 3 days/month (be specific) and also include for max # of consecutive days. The dates i missed work in Aug are covered with the current way it's filled out. September dates are 9/2, 9/6, 9/8, 9/9, 9/12, 9/15. I don't think that i'll have any more due to vertigo but probably migraines. Yesterday was good. About an hour after we spoke, my nose started running & the vertigo kicked in but only mild. Went away about an hour after that. Then the same, in the evening. I think it corresponded to walking the dog. I don't spend time outdoors. Dizziness with quick movement still applies.

## 2020-09-24 ENCOUNTER — OFFICE VISIT (OUTPATIENT)
Dept: MEDICAL GROUP | Facility: LAB | Age: 45
End: 2020-09-24
Payer: COMMERCIAL

## 2020-09-24 VITALS
TEMPERATURE: 99.4 F | BODY MASS INDEX: 37.64 KG/M2 | OXYGEN SATURATION: 98 % | RESPIRATION RATE: 12 BRPM | SYSTOLIC BLOOD PRESSURE: 140 MMHG | WEIGHT: 284 LBS | HEART RATE: 94 BPM | HEIGHT: 73 IN | DIASTOLIC BLOOD PRESSURE: 80 MMHG

## 2020-09-24 DIAGNOSIS — M54.12 CERVICAL RADICULOPATHY: ICD-10-CM

## 2020-09-24 PROCEDURE — 99213 OFFICE O/P EST LOW 20 MIN: CPT | Performed by: FAMILY MEDICINE

## 2020-09-24 RX ORDER — CYCLOBENZAPRINE HCL 5 MG
5 TABLET ORAL 3 TIMES DAILY PRN
Qty: 30 TAB | Refills: 0 | Status: SHIPPED | OUTPATIENT
Start: 2020-09-24 | End: 2020-10-07 | Stop reason: SDUPTHER

## 2020-09-24 NOTE — LETTER
September 24, 2020    To Whom It May Concern:         This is confirmation that Chaitanya CARROL Kelly attended his scheduled appointment with Reza Chin M.D. on 9/24/20.         If you have any questions please do not hesitate to call me at the phone number listed below.    Sincerely,          Reza Chin M.D.  [phone]

## 2020-09-24 NOTE — PROGRESS NOTES
"Subjective:     CC: Right shoulder pain    HPI:   Chaitanya is a 46 yo patient of Cesilia Dotson who presents today with:    Right shoulder pain  Started 3 weeks ago and worsening since then, severely worsened in the last week. Described as \"constant, throbbing, needle-like pain\", 10/10. So severe now that he is missing work and cannot sleep. Also a burning pain and pins and needles that radiate down right arm. He has pain in right upper neck and across right upper back as well. Extending neck triggers severe pain from neck down to shoulder and increased numbness in arm as well.  No weakness.  Ice/heat and tylenol not helping.  No recent injury or trauma, reports cuff tear in high school. No neck trauma.    Medications, past medical history, allergies, and social history have been reviewed and updated.    ROS: See HPI      Objective:       Exam:  /80 (BP Location: Right arm, Patient Position: Sitting, BP Cuff Size: Large adult)   Pulse 94   Temp 37.4 °C (99.4 °F)   Resp 12   Ht 1.854 m (6' 1\")   Wt (!) 128.8 kg (284 lb)   SpO2 98%   BMI 37.47 kg/m²  Body mass index is 37.47 kg/m².    Constitutional: Alert. Well appearing. Mild distress.  Skin: Warm, dry, good turgor, no visible rashes.  Eye: Equal, round and reactive to light, conjunctiva clear, lids normal.  ENMT: Moist mucous membranes. Normal dentition.  Respiratory: Normal effort.   MSK: Normal, nonpainful range of motion of right shoulder.  Rotator cuff testing with normal strength and no increase in pain.  There is some mild tenderness on palpation of the anterior shoulder.  Right neck pain with radiation to right shoulder upon rotating neck to the left along with positive right-sided Spurling's sign.  Strength intact to bilateral upper extremities.  Sensation is grossly intact both upper extremities.  Biceps and triceps reflexes are 2+ and symmetric.  Psych: Answers questions appropriately. Normal affect and mood.      Assessment & Plan:     45 " y.o. male with the following -     1. Cervical radiculopathy  New issue.  Pain to right neck and shoulder that began 3 weeks ago but has severely worsened in the last week.  Currently 10/10 pain and precluding him from working or sleeping.  Burning pain that radiates down right arm as well as a pins-and-needles feeling radiating down the right arm.  No history of injury.  Positive Spurling sign and pain with neck range of motion on exam.  His main complaint is right shoulder pain but suspect cervical source of his pain.  Given duration and severity will proceed with cervical MRI. Referral sent to physiatry.  Discussed Tylenol dosage and will try muscle relaxer as needed for pain for now.  Reports he cannot use NSAIDs due to lithium. Close follow up with PCP.  - cyclobenzaprine (FLEXERIL) 5 MG tablet; Take 1 Tab by mouth 3 times a day as needed for Severe Pain or Muscle Spasms.  Dispense: 30 Tab; Refill: 0  - MR-CERVICAL SPINE-W/O; Future  - REFERRAL TO PHYSIATRY (PMR)    Please note that this note was created using voice recognition software.

## 2020-09-30 ENCOUNTER — TELEMEDICINE (OUTPATIENT)
Dept: MEDICAL GROUP | Facility: LAB | Age: 45
End: 2020-09-30
Payer: COMMERCIAL

## 2020-09-30 VITALS — BODY MASS INDEX: 37.47 KG/M2 | HEIGHT: 73 IN

## 2020-09-30 DIAGNOSIS — R42 VERTIGO: ICD-10-CM

## 2020-09-30 DIAGNOSIS — F31.32 BIPOLAR 1 DISORDER, DEPRESSED, MODERATE (HCC): Chronic | ICD-10-CM

## 2020-09-30 DIAGNOSIS — R42 DIZZINESS: ICD-10-CM

## 2020-09-30 DIAGNOSIS — G43.009 MIGRAINE WITHOUT AURA AND WITHOUT STATUS MIGRAINOSUS, NOT INTRACTABLE: ICD-10-CM

## 2020-09-30 DIAGNOSIS — M54.2 NECK PAIN: ICD-10-CM

## 2020-09-30 PROCEDURE — 99214 OFFICE O/P EST MOD 30 MIN: CPT | Mod: 95,CR | Performed by: PHYSICIAN ASSISTANT

## 2020-09-30 RX ORDER — METHYLPREDNISOLONE 4 MG/1
TABLET ORAL
Qty: 21 TAB | Refills: 0 | Status: SHIPPED | OUTPATIENT
Start: 2020-09-30 | End: 2020-10-28

## 2020-09-30 RX ORDER — BUPROPION HYDROCHLORIDE 150 MG/1
TABLET ORAL
COMMUNITY
Start: 2020-09-25 | End: 2020-09-30

## 2020-09-30 RX ORDER — BUPROPION HYDROCHLORIDE 150 MG/1
150 TABLET ORAL 2 TIMES DAILY
Qty: 90 TAB | Refills: 0
Start: 2020-09-30

## 2020-09-30 NOTE — PROGRESS NOTES
"This evaluation was conducted via Zoom using secure and encrypted videoconferencing technology. The patient was in a private location in the state [facility].    The patient's identity was confirmed and verbal consent was obtained for this virtual visit.    Subjective:     CC: Follow up on Migraine, dizziness and \"major pain in the shoulder.\"  Chaitanya Kelly is a 45 y.o. male presenting to discuss the evaluation and management of Migraine, dizziness, neck pain     Migraine without aura and without status migrainosus, not intractable  Mild sensitivity to light and sound.   No nausea/vomiting  Worsening dizziness.     Unsure if related, though is have worsening neck pain with right sided radiculopathy.   Has been using muscle relaxer since he saw Dr. PACHECO.     Scheduled to have Brain MRI and C Spine MRI on 10/08/2020.   Labs for 10/02/2020.       Vertigo  Follow up; now worsening.   Pt to have good and bad days, though dizziness is worsening and is associated with migraine as well.     Was seen in  initially 08/30 for Vertigo.     Mild headache yesterday which turned into Migraine today.   Does have seasonal allergies and environmental allergies.   No fevers/chiils.   Has had some nausea and vomiting related to vertigo.      Did have COVID 07/2020.     Neck pain  Was seen by Dr. Chin for cervicalgia and radiculopathy.   Pt reports that he is having numbness and tingling down right arm  Less when he flexes his neck forward and down toward chest.     Using Flexeril with some relief.     ROS  See HPI  Constitutional: Negative for fever, chills and malaise/fatigue.   Respiratory: Negative for cough and shortness of breath.    Cardiovascular: Negative for leg swelling.   Skin: Negative for rash.   Neuro +Dizziness +migraine   MSK: +neck pain, numbness/tinging in R arm.   Psychiatric/Behavioral: Negative for depression.  The patient is not nervous/anxious.      Allergies   Allergen Reactions   • Ibuprofen " Unspecified     Interaction with Lithium   • Latex Itching   • Naprosyn [Naproxen] Unspecified     Interaction with Lithium       Current medicines (including changes today)  Current Outpatient Medications   Medication Sig Dispense Refill   • methylPREDNISolone (MEDROL DOSEPAK) 4 MG Tablet Therapy Pack As directed on the packaging label. 21 Tab 0   • buPROPion (WELLBUTRIN XL) 150 MG XL tablet Take 1 Tab by mouth 2 Times a Day. 90 Tab 0   • cyclobenzaprine (FLEXERIL) 5 MG tablet Take 1 Tab by mouth 3 times a day as needed for Severe Pain or Muscle Spasms. 30 Tab 0   • meclizine (ANTIVERT) 25 MG Tab Take 1 Tab by mouth 3 times a day as needed. 30 Tab 0   • ondansetron (ZOFRAN) 4 MG Tab tablet Take 1 Tab by mouth every 6 hours as needed for Nausea/Vomiting. 20 Tab 1   • acetaminophen (TYLENOL) 325 MG Tab Take 650 mg by mouth every four hours as needed.     • lamoTRIgine (LAMICTAL) 100 MG Tab TAKE 1 TABLET BY MOUTH ONCE DAILY IN THE MORNING 90 Tab 1   • lithium CR (ESKALITH CR) 450 MG Tab CR TAKE 1 TABLET BY MOUTH TWICE DAILY 180 Tab 1     No current facility-administered medications for this visit.        He  has a past medical history of Anxiety, ASTHMA, Bipolar 2 disorder, Bipolar disorder (HCC), Headache(784.0), Migraine, Pain (10/10/2017), and Seizure ([facility]) ().  He  has a past surgical history that includes umbilical hernia repair (N/A, 10/18/2017).      Family History   Problem Relation Age of Onset   • Heart Disease Father 41        heart attack   • Heart Disease Paternal Aunt    • Heart Disease Paternal Uncle    • Stroke Paternal Uncle    • Heart Disease Paternal Grandfather    • Cancer Maternal Aunt    • Lung Cancer Maternal Aunt      Family Status   Relation Name Status   • Mo  Alive   • Fa     • PAunt  (Not Specified)   • PUnc  (Not Specified)   • PGFa  (Not Specified)   • MAunt  Alive   • MAunt         Patient Active Problem List    Diagnosis Date Noted   • Celiac disease 2020   •  "Vertigo 09/08/2020   • Elevated blood pressure reading 07/14/2020   • Umbilical hernia without obstruction or gangrene 10/18/2017   • Abdominal wall bulge 07/25/2017   • Mild intermittent asthma without complication 12/22/2016   • Migraine without aura and without status migrainosus, not intractable 12/22/2016   • Chronic fatigue 12/01/2016   • Bipolar 1 disorder, depressed, moderate (HCC) 10/13/2016     Class: Chronic   • Neck pain 09/09/2016   • Obstructive sleep apnea 04/15/2016   • Dyslipidemia 02/06/2014   • Obesity (BMI 30-39.9) 02/06/2014   • Family history of early CAD 02/06/2014          Objective:   Ht 1.854 m (6' 1\")   BMI 37.47 kg/m²     Physical Exam:  Constitutional: Alert, no distress, well-groomed.  Skin: No rashes in visible areas.  Eye: Round. Conjunctiva clear, lids normal. No icterus.   ENMT: Lips pink without lesions, good dentition, moist mucous membranes. Phonation normal.  Neck: No masses, no thyromegaly. Moves freely with subjective pain, improved with neck flexion forward.   Respiratory: Unlabored respiratory effort, no cough or audible wheeze  Neuro: CN 2-12 are grossly intact.   Psych: Alert and oriented x3, normal affect and mood.       Assessment and Plan:   The following treatment plan was discussed:     1. Neck pain  New to me; worsening  Rx for Medrol Daniel as written.   Pt was educated on use and SEs of medication.  To stop medication if this worsens mood.   To have MRI on 10/08/2020  - methylPREDNISolone (MEDROL DOSEPAK) 4 MG Tablet Therapy Pack; As directed on the packaging label.  Dispense: 21 Tab; Refill: 0  - REFERRAL TO NEUROLOGY    2. Migraine without aura and without status migrainosus, not intractable  - REFERRAL TO NEUROLOGY  Medrol Daniel to help with this as well  Awaiting Brain MRI - 10/08/2020  Urgent referral to Neuro as this has been worsening over the past month.     3. Dizziness  - REFERRAL TO NEUROLOGY    4. Bipolar 1 disorder, depressed, moderate (HCC)  Stable  Has " recently seen Psychiatry.   No changes to regimen.   - buPROPion (WELLBUTRIN XL) 150 MG XL tablet; Take 1 Tab by mouth 2 Times a Day.  Dispense: 90 Tab; Refill: 0    5. Vertigo  Not well controlled, symptoms come and go.   Ok to continue zofran and Meclazine         Follow-up: Return if symptoms worsen or fail to improve, for after MRIs and Neuro consult.    Cesilia Dotson P.A.-C.  Supervising MD: Dr. Holley Norton MD  09/30/20

## 2020-10-02 ENCOUNTER — HOSPITAL ENCOUNTER (OUTPATIENT)
Dept: LAB | Facility: MEDICAL CENTER | Age: 45
End: 2020-10-02
Attending: PHYSICIAN ASSISTANT
Payer: COMMERCIAL

## 2020-10-02 DIAGNOSIS — Z82.49 FAMILY HISTORY OF EARLY CAD: ICD-10-CM

## 2020-10-02 DIAGNOSIS — E66.9 OBESITY (BMI 30-39.9): ICD-10-CM

## 2020-10-02 DIAGNOSIS — R42 VERTIGO: ICD-10-CM

## 2020-10-02 DIAGNOSIS — F31.32 BIPOLAR 1 DISORDER, DEPRESSED, MODERATE (HCC): Chronic | ICD-10-CM

## 2020-10-02 DIAGNOSIS — D72.829 LEUKOCYTOSIS, UNSPECIFIED TYPE: ICD-10-CM

## 2020-10-02 LAB
ALBUMIN SERPL BCP-MCNC: 4.4 G/DL (ref 3.2–4.9)
ALBUMIN/GLOB SERPL: 1.6 G/DL
ALP SERPL-CCNC: 79 U/L (ref 30–99)
ALT SERPL-CCNC: 36 U/L (ref 2–50)
ANION GAP SERPL CALC-SCNC: 15 MMOL/L (ref 7–16)
AST SERPL-CCNC: 14 U/L (ref 12–45)
BASOPHILS # BLD AUTO: 0.3 % (ref 0–1.8)
BASOPHILS # BLD: 0.05 K/UL (ref 0–0.12)
BILIRUB SERPL-MCNC: 0.5 MG/DL (ref 0.1–1.5)
BUN SERPL-MCNC: 16 MG/DL (ref 8–22)
CALCIUM SERPL-MCNC: 9.2 MG/DL (ref 8.5–10.5)
CHLORIDE SERPL-SCNC: 105 MMOL/L (ref 96–112)
CHOLEST SERPL-MCNC: 218 MG/DL (ref 100–199)
CO2 SERPL-SCNC: 19 MMOL/L (ref 20–33)
CREAT SERPL-MCNC: 1.03 MG/DL (ref 0.5–1.4)
EOSINOPHIL # BLD AUTO: 0.04 K/UL (ref 0–0.51)
EOSINOPHIL NFR BLD: 0.2 % (ref 0–6.9)
ERYTHROCYTE [DISTWIDTH] IN BLOOD BY AUTOMATED COUNT: 48.6 FL (ref 35.9–50)
EST. AVERAGE GLUCOSE BLD GHB EST-MCNC: 111 MG/DL
FASTING STATUS PATIENT QL REPORTED: NORMAL
GLOBULIN SER CALC-MCNC: 2.8 G/DL (ref 1.9–3.5)
GLUCOSE SERPL-MCNC: 94 MG/DL (ref 65–99)
HBA1C MFR BLD: 5.5 % (ref 0–5.6)
HCT VFR BLD AUTO: 49.9 % (ref 42–52)
HDLC SERPL-MCNC: 32 MG/DL
HGB BLD-MCNC: 15.6 G/DL (ref 14–18)
IMM GRANULOCYTES # BLD AUTO: 0.08 K/UL (ref 0–0.11)
IMM GRANULOCYTES NFR BLD AUTO: 0.5 % (ref 0–0.9)
LDLC SERPL CALC-MCNC: 146 MG/DL
LYMPHOCYTES # BLD AUTO: 1.88 K/UL (ref 1–4.8)
LYMPHOCYTES NFR BLD: 11.4 % (ref 22–41)
MCH RBC QN AUTO: 27.9 PG (ref 27–33)
MCHC RBC AUTO-ENTMCNC: 31.3 G/DL (ref 33.7–35.3)
MCV RBC AUTO: 89.1 FL (ref 81.4–97.8)
MONOCYTES # BLD AUTO: 0.96 K/UL (ref 0–0.85)
MONOCYTES NFR BLD AUTO: 5.8 % (ref 0–13.4)
NEUTROPHILS # BLD AUTO: 13.52 K/UL (ref 1.82–7.42)
NEUTROPHILS NFR BLD: 81.8 % (ref 44–72)
NRBC # BLD AUTO: 0 K/UL
NRBC BLD-RTO: 0 /100 WBC
PLATELET # BLD AUTO: 340 K/UL (ref 164–446)
PMV BLD AUTO: 10.5 FL (ref 9–12.9)
POTASSIUM SERPL-SCNC: 4.2 MMOL/L (ref 3.6–5.5)
PROT SERPL-MCNC: 7.2 G/DL (ref 6–8.2)
RBC # BLD AUTO: 5.6 M/UL (ref 4.7–6.1)
SODIUM SERPL-SCNC: 139 MMOL/L (ref 135–145)
TRIGL SERPL-MCNC: 199 MG/DL (ref 0–149)
WBC # BLD AUTO: 16.5 K/UL (ref 4.8–10.8)

## 2020-10-02 PROCEDURE — 80061 LIPID PANEL: CPT

## 2020-10-02 PROCEDURE — 85025 COMPLETE CBC W/AUTO DIFF WBC: CPT

## 2020-10-02 PROCEDURE — 80178 ASSAY OF LITHIUM: CPT

## 2020-10-02 PROCEDURE — 84443 ASSAY THYROID STIM HORMONE: CPT

## 2020-10-02 PROCEDURE — 36415 COLL VENOUS BLD VENIPUNCTURE: CPT

## 2020-10-02 PROCEDURE — 84402 ASSAY OF FREE TESTOSTERONE: CPT

## 2020-10-02 PROCEDURE — 80053 COMPREHEN METABOLIC PANEL: CPT

## 2020-10-02 PROCEDURE — 84403 ASSAY OF TOTAL TESTOSTERONE: CPT

## 2020-10-02 PROCEDURE — 82306 VITAMIN D 25 HYDROXY: CPT

## 2020-10-02 PROCEDURE — 83036 HEMOGLOBIN GLYCOSYLATED A1C: CPT

## 2020-10-02 PROCEDURE — 84270 ASSAY OF SEX HORMONE GLOBUL: CPT

## 2020-10-03 PROBLEM — E55.9 VITAMIN D DEFICIENCY: Status: ACTIVE | Noted: 2020-10-03

## 2020-10-03 LAB
25(OH)D3 SERPL-MCNC: 28 NG/ML (ref 30–100)
LITHIUM SERPL-MCNC: 0.5 MMOL/L (ref 0.6–1.2)
TSH SERPL DL<=0.005 MIU/L-ACNC: 2.04 UIU/ML (ref 0.38–5.33)

## 2020-10-03 NOTE — PROGRESS NOTES
1. Leukocytosis, unspecified type  DX-CHEST-2 VIEWS       CBC shows acutely elevated WBC.   Awaiting pt response about new fevers/chills, cough, urinary symptoms, dizziness, worsening headache?    Cesilia Dotson P.A.-C.

## 2020-10-04 LAB
SHBG SERPL-SCNC: 24 NMOL/L (ref 11–80)
TESTOST FREE MFR SERPL: 2.1 % (ref 1.6–2.9)
TESTOST FREE SERPL-MCNC: 63 PG/ML (ref 47–244)
TESTOST SERPL-MCNC: 299 NG/DL (ref 300–890)

## 2020-10-05 DIAGNOSIS — D72.829 LEUKOCYTOSIS, UNSPECIFIED TYPE: ICD-10-CM

## 2020-10-07 DIAGNOSIS — H81.13 BPPV (BENIGN PAROXYSMAL POSITIONAL VERTIGO), BILATERAL: ICD-10-CM

## 2020-10-07 RX ORDER — MECLIZINE HYDROCHLORIDE 25 MG/1
25 TABLET ORAL 3 TIMES DAILY PRN
Qty: 30 TAB | Refills: 3 | Status: SHIPPED | OUTPATIENT
Start: 2020-10-07 | End: 2021-07-12

## 2020-10-07 NOTE — TELEPHONE ENCOUNTER
Received request via: Patient    Was the patient seen in the last year in this department? Yes  9/30/2020  Does the patient have an active prescription (recently filled or refills available) for medication(s) requested? No

## 2020-10-09 ENCOUNTER — HOSPITAL ENCOUNTER (OUTPATIENT)
Facility: MEDICAL CENTER | Age: 45
End: 2020-10-09
Attending: NURSE PRACTITIONER
Payer: COMMERCIAL

## 2020-10-09 ENCOUNTER — NURSE TRIAGE (OUTPATIENT)
Dept: HEALTH INFORMATION MANAGEMENT | Facility: OTHER | Age: 45
End: 2020-10-09

## 2020-10-09 ENCOUNTER — OFFICE VISIT (OUTPATIENT)
Dept: URGENT CARE | Facility: PHYSICIAN GROUP | Age: 45
End: 2020-10-09
Payer: COMMERCIAL

## 2020-10-09 VITALS
TEMPERATURE: 99.2 F | DIASTOLIC BLOOD PRESSURE: 90 MMHG | HEART RATE: 86 BPM | RESPIRATION RATE: 18 BRPM | HEIGHT: 73 IN | BODY MASS INDEX: 37.91 KG/M2 | OXYGEN SATURATION: 99 % | SYSTOLIC BLOOD PRESSURE: 160 MMHG | WEIGHT: 286 LBS

## 2020-10-09 DIAGNOSIS — R03.0 ELEVATED BLOOD PRESSURE READING: ICD-10-CM

## 2020-10-09 DIAGNOSIS — F41.9 ANXIETY: ICD-10-CM

## 2020-10-09 DIAGNOSIS — R07.89 CHEST PRESSURE: ICD-10-CM

## 2020-10-09 DIAGNOSIS — R50.9 FEVER, UNSPECIFIED FEVER CAUSE: ICD-10-CM

## 2020-10-09 DIAGNOSIS — J02.9 SORE THROAT: ICD-10-CM

## 2020-10-09 DIAGNOSIS — B34.9 VIRAL ILLNESS: Primary | ICD-10-CM

## 2020-10-09 LAB
COVID ORDER STATUS COVID19: NORMAL
FLUAV+FLUBV AG SPEC QL IA: NEGATIVE
INT CON NEG: NEGATIVE
INT CON NEG: NORMAL
INT CON POS: NORMAL
INT CON POS: POSITIVE
S PYO AG THROAT QL: NORMAL

## 2020-10-09 PROCEDURE — 87880 STREP A ASSAY W/OPTIC: CPT | Performed by: NURSE PRACTITIONER

## 2020-10-09 PROCEDURE — U0003 INFECTIOUS AGENT DETECTION BY NUCLEIC ACID (DNA OR RNA); SEVERE ACUTE RESPIRATORY SYNDROME CORONAVIRUS 2 (SARS-COV-2) (CORONAVIRUS DISEASE [COVID-19]), AMPLIFIED PROBE TECHNIQUE, MAKING USE OF HIGH THROUGHPUT TECHNOLOGIES AS DESCRIBED BY CMS-2020-01-R: HCPCS

## 2020-10-09 PROCEDURE — 87804 INFLUENZA ASSAY W/OPTIC: CPT | Performed by: NURSE PRACTITIONER

## 2020-10-09 PROCEDURE — 93000 ELECTROCARDIOGRAM COMPLETE: CPT | Performed by: NURSE PRACTITIONER

## 2020-10-09 PROCEDURE — 99215 OFFICE O/P EST HI 40 MIN: CPT | Performed by: NURSE PRACTITIONER

## 2020-10-09 ASSESSMENT — FIBROSIS 4 INDEX: FIB4 SCORE: 0.32

## 2020-10-09 NOTE — TELEPHONE ENCOUNTER
1. Caller Name:Chaitanya Kelly                 Call Back Number:   [facility] PCP or Specialty Provider: Yes         2.  In the last two weeks, has the patient had any new or worsening symptoms (not explained by alternative diagnosis)? Yes, the patient reports the following COVID-19 consistent symptoms: sore throat. With muscle spasms, body aches.     3.  Does patient have any comoribidities? None     4.  Has the patient traveled in the last 14 days OR had any known contact with someone who is suspected or confirmed to have COVID-19?  No.    5. Disposition: Advised to perform self care, monitor for worsening symptoms and to call back in 3 days if no improvement    Note routed to [facility] Provider: BELLA only.

## 2020-10-10 LAB
SARS-COV-2 RNA RESP QL NAA+PROBE: NOTDETECTED
SPECIMEN SOURCE: NORMAL

## 2020-10-12 ENCOUNTER — HOSPITAL ENCOUNTER (OUTPATIENT)
Dept: RADIOLOGY | Facility: MEDICAL CENTER | Age: 45
End: 2020-10-12

## 2020-10-15 ENCOUNTER — APPOINTMENT (OUTPATIENT)
Dept: PHYSICAL MEDICINE AND REHAB | Facility: MEDICAL CENTER | Age: 45
End: 2020-10-15

## 2020-10-15 ASSESSMENT — ENCOUNTER SYMPTOMS
NAUSEA: 0
WHEEZING: 0
COUGH: 0
SORE THROAT: 1
FEVER: 1
VOMITING: 0
DIZZINESS: 0
TINGLING: 0
FOCAL WEAKNESS: 0
SHORTNESS OF BREATH: 0
TREMORS: 1
CHILLS: 1

## 2020-10-15 ASSESSMENT — LIFESTYLE VARIABLES: SUBSTANCE_ABUSE: 0

## 2020-10-15 NOTE — PROGRESS NOTES
"Subjective:      Chaitanya Kelly is a 45 y.o. male who presents with Sore Throat (muscle \"tremors\" Headaches, sweats, chest pain, jaw pain, onset day before yesterday, )    Reviewed past medical, surgical and family history. Reviewed prescription and OTC medications with patient in electronic health record today  Allergies: Ibuprofen, Latex, and Naprosyn [naproxen]                HPI This is a new problem.  C/o sore throat with 'tremors' that started yesterday. Assoc Sx: subjective fevers, chest pain, jaw pain, chest wall pain, headaches. Treatment tried: none. He is concerned about covid infection. No know exposure. Tremors did not last long and he believes they were associated with a fever. He also had body aches at the time. Did not measure a temperature due to not having a thermometer.  Mild neck discomfort with movement. No rash. No other aggravating or alleviating factors.       Review of Systems   Constitutional: Positive for chills, fever and malaise/fatigue.   HENT: Positive for sore throat. Negative for congestion and ear pain.    Respiratory: Negative for cough, shortness of breath and wheezing.    Cardiovascular: Negative for chest pain.   Gastrointestinal: Negative for nausea and vomiting.   Neurological: Positive for tremors. Negative for dizziness, tingling and focal weakness.   Endo/Heme/Allergies: Negative for environmental allergies.   Psychiatric/Behavioral: Negative for substance abuse.          Objective:     /90   Pulse 86   Temp 37.3 °C (99.2 °F)   Resp 18   Ht 1.854 m (6' 1\")   Wt (!) 129.7 kg (286 lb)   SpO2 99%   BMI 37.73 kg/m²      Physical Exam  Vitals signs and nursing note reviewed.   Constitutional:       General: He is not in acute distress.     Appearance: Normal appearance. He is well-developed and normal weight.   HENT:      Head: Normocephalic.      Mouth/Throat:      Mouth: Mucous membranes are moist.      Pharynx: Posterior oropharyngeal erythema present. "   Eyes:      Conjunctiva/sclera: Conjunctivae normal.      Pupils: Pupils are equal, round, and reactive to light.   Neck:      Musculoskeletal: Full passive range of motion without pain, normal range of motion and neck supple. Normal range of motion. No neck rigidity.      Trachea: Trachea and phonation normal.   Cardiovascular:      Rate and Rhythm: Normal rate and regular rhythm.      Pulses: Normal pulses.      Heart sounds: Normal heart sounds.   Pulmonary:      Effort: Pulmonary effort is normal.   Musculoskeletal:      Right lower leg: No edema.      Left lower leg: No edema.   Lymphadenopathy:      Head:      Right side of head: No tonsillar adenopathy.      Left side of head: No tonsillar adenopathy.      Cervical: No cervical adenopathy.      Right cervical: No superficial, deep or posterior cervical adenopathy.     Left cervical: No deep or posterior cervical adenopathy.      Upper Body:      Right upper body: No supraclavicular adenopathy.      Left upper body: No supraclavicular adenopathy.   Skin:     General: Skin is warm and dry.      Capillary Refill: Capillary refill takes less than 2 seconds.      Findings: No rash.   Neurological:      Mental Status: He is alert and oriented to person, place, and time.   Psychiatric:         Mood and Affect: Mood normal.         Speech: Speech normal.         Behavior: Behavior normal. Behavior is cooperative.         Thought Content: Thought content normal.         Judgment: Judgment normal.       EKG Interpretation    Interpreted by me    Rhythm: normal sinus   Rate: normal  Axis: normal  Ectopy: none  Conduction: normal  ST Segments: no acute change  T Waves: no acute change  Q Waves: none    Clinical Impression: no acute changes        Patient was seen for 45  minutes face to face of which > 50% of appointment time was spent on counseling and coordination of care regarding the above.         Assessment/Plan:        1. Viral illness     2. Sore throat  POCT  Rapid Strep A    COVID/SARS COV-2 PCR    POCT Influenza A/B   3. Fever, unspecified fever cause  POCT Rapid Strep A    COVID/SARS COV-2 PCR    POCT Influenza A/B   4. Elevated blood pressure reading     5. Anxiety     6. Chest pressure  EKG - Cardiology Performed       Referred to his primary physician for BP monitoring and management. Educated in end organ effects of uncontrolled BP  including MI, CVA, Blindness, CRF and death. Educated in TLC's to reduce her blood pressure.     Resume all prior  RX medications. Take as prescribed.     Anxiety reduction techniques discussed. Relaxation, deep breathing , distraction.     COVID testing - pending  Self Quarantine per CDC guidelines  Educated in infection control practices.   Discussed that this illness was viral in nature. Did not see any evidence of a bacterial process.   OTC  analgesic of choice. Follow manufactures dosing and safety precautions.   Keep well hydrated    Return to urgent care clinic or PCP prn  if current symptoms are not resolving in a satisfactory manner or sooner if new or worsening symptoms occur.   Differential diagnosis, natural history, supportive care, and indications for immediate follow-up. Advised of signs and symptoms which would warrant further evaluation and /or emergent evaluation in ER.    Verbalized agreement with this treatment plan and seemed to understand without barriers. Questions were encouraged and answered to satisfaction.
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
## 2020-10-28 ENCOUNTER — OFFICE VISIT (OUTPATIENT)
Dept: MEDICAL GROUP | Facility: LAB | Age: 45
End: 2020-10-28
Payer: COMMERCIAL

## 2020-10-28 VITALS
BODY MASS INDEX: 37.77 KG/M2 | WEIGHT: 285 LBS | SYSTOLIC BLOOD PRESSURE: 170 MMHG | DIASTOLIC BLOOD PRESSURE: 90 MMHG | RESPIRATION RATE: 16 BRPM | HEART RATE: 76 BPM | TEMPERATURE: 98.7 F | OXYGEN SATURATION: 96 % | HEIGHT: 73 IN

## 2020-10-28 DIAGNOSIS — L60.0 INGROWN TOENAIL OF BOTH FEET: ICD-10-CM

## 2020-10-28 DIAGNOSIS — I10 ESSENTIAL HYPERTENSION: Primary | ICD-10-CM

## 2020-10-28 PROCEDURE — 99213 OFFICE O/P EST LOW 20 MIN: CPT | Performed by: PHYSICIAN ASSISTANT

## 2020-10-28 RX ORDER — AMLODIPINE BESYLATE 10 MG/1
10 TABLET ORAL DAILY
Qty: 30 TAB | Refills: 0 | Status: SHIPPED | OUTPATIENT
Start: 2020-10-28 | End: 2020-11-25 | Stop reason: SDUPTHER

## 2020-10-28 ASSESSMENT — FIBROSIS 4 INDEX: FIB4 SCORE: 0.32

## 2020-10-28 NOTE — ASSESSMENT & PLAN NOTE
New to me; BP has been elevated for the past several OV.   Has not been on medication in the past.   Open to starting medication today  No chest pain, sob  Denies cough  No leg swelling.     Does use Lithium, Lamictal and Wellbutrin for Bipolar

## 2020-10-28 NOTE — PROGRESS NOTES
"Subjective:   Chaitanya Kelly is a 45 y.o. male here today for follow up, BP check     Essential hypertension  New to me; BP has been elevated for the past several OV.   Has not been on medication in the past.   Open to starting medication today  No chest pain, sob  Denies cough  No leg swelling.     Does use Lithium, Lamictal and Wellbutrin for Bipolar     Ingrown toenail of both feet  New to me; chronic issue.   Pain is worse on the R toe.   Requesting to see Podiatry.          Current medicines (including changes today)  Current Outpatient Medications   Medication Sig Dispense Refill   • amLODIPine (NORVASC) 10 MG Tab Take 1 Tab by mouth every day. 30 Tab 0   • meclizine (ANTIVERT) 25 MG Tab Take 1 Tab by mouth 3 times a day as needed. 30 Tab 3   • cyclobenzaprine (FLEXERIL) 5 mg tablet Take 1 Tab by mouth 3 times a day as needed. 30 Tab 0   • buPROPion (WELLBUTRIN XL) 150 MG XL tablet Take 1 Tab by mouth 2 Times a Day. 90 Tab 0   • acetaminophen (TYLENOL) 325 MG Tab Take 650 mg by mouth every four hours as needed.     • lamoTRIgine (LAMICTAL) 100 MG Tab TAKE 1 TABLET BY MOUTH ONCE DAILY IN THE MORNING 90 Tab 1   • lithium CR (ESKALITH CR) 450 MG Tab CR TAKE 1 TABLET BY MOUTH TWICE DAILY 180 Tab 1     No current facility-administered medications for this visit.      He  has a past medical history of Anxiety, ASTHMA, Bipolar 2 disorder, Bipolar disorder ([facility]), Headache(784.0), Migraine, Pain (10/10/2017), and Seizure ([facility]) (1980).    ROS   +Ingrown toenail   No chest pain, no shortness of breath, no abdominal pain       Objective:     BP (!) 170/90   Pulse 76   Temp 37.1 °C (98.7 °F)   Resp 16   Ht 1.854 m (6' 1\")   Wt (!) 129.3 kg (285 lb)   SpO2 96%  Body mass index is 37.6 kg/m².     Physical Exam:  Constitutional: Alert, no distress.  Skin: Warm, dry, good turgor, no rashes in visible areas.  Eye: Equal, round and reactive, conjunctiva clear, lids normal.  Neck: Trachea midline  Respiratory: " Unlabored respiratory effort, lungs clear to auscultation, no wheezes, no ronchi.  Cardiovascular: Normal S1, S2, no murmur, no edema.  Psych: Alert and oriented x3, normal affect and mood.        Assessment and Plan:   The following treatment plan was discussed    1. Ingrown toenail of both feet  - REFERRAL TO PODIATRY    2. Essential hypertension  Ongoing at last few visits. Pt does have ongoing neck and R shoulder pain that he states is worse today, also having heightened anxiety - both of which may be playing a role in HTN today.   Will start Amlodipine at 10mg today. ACE/ARB and diuretic have interaction/caution with Lithium levels. Pt was educated on use and SEs of medication. To be on lookout for leg swelling which is a common side effect of amlodipine.   Will check BP in clinic again in 1 week.   Strict ED precautions if he has any chest pain, sob, left sided jaw or arm pain.   Pt agrees with the plan.   - amLODIPine (NORVASC) 10 MG Tab; Take 1 Tab by mouth every day.  Dispense: 30 Tab; Refill: 0      Followup: Return in about 1 week (around 11/4/2020) for  - BP check .       Cesilia Dotson P.A.-C.  Supervising MD: Dr. Holley Norton MD  10/28/20

## 2020-10-29 ENCOUNTER — OFFICE VISIT (OUTPATIENT)
Dept: PHYSICAL MEDICINE AND REHAB | Facility: MEDICAL CENTER | Age: 45
End: 2020-10-29
Payer: COMMERCIAL

## 2020-10-29 VITALS
DIASTOLIC BLOOD PRESSURE: 98 MMHG | WEIGHT: 286.6 LBS | HEIGHT: 74 IN | HEART RATE: 90 BPM | TEMPERATURE: 98.6 F | OXYGEN SATURATION: 97 % | BODY MASS INDEX: 36.78 KG/M2 | SYSTOLIC BLOOD PRESSURE: 170 MMHG

## 2020-10-29 DIAGNOSIS — M25.511 CHRONIC RIGHT SHOULDER PAIN: ICD-10-CM

## 2020-10-29 DIAGNOSIS — M77.11 RIGHT LATERAL EPICONDYLITIS: ICD-10-CM

## 2020-10-29 DIAGNOSIS — G56.01 RIGHT CARPAL TUNNEL SYNDROME: ICD-10-CM

## 2020-10-29 DIAGNOSIS — G89.29 CHRONIC PAIN OF RIGHT UPPER EXTREMITY: ICD-10-CM

## 2020-10-29 DIAGNOSIS — M75.21 BICEPS TENDINITIS OF RIGHT UPPER EXTREMITY: ICD-10-CM

## 2020-10-29 DIAGNOSIS — M25.521 RIGHT ELBOW PAIN: ICD-10-CM

## 2020-10-29 DIAGNOSIS — M79.2 NEURALGIA: ICD-10-CM

## 2020-10-29 DIAGNOSIS — M79.601 CHRONIC PAIN OF RIGHT UPPER EXTREMITY: ICD-10-CM

## 2020-10-29 DIAGNOSIS — R20.2 NUMBNESS AND TINGLING OF RIGHT HAND: ICD-10-CM

## 2020-10-29 DIAGNOSIS — R20.0 NUMBNESS AND TINGLING OF RIGHT HAND: ICD-10-CM

## 2020-10-29 DIAGNOSIS — G89.29 CHRONIC RIGHT SHOULDER PAIN: ICD-10-CM

## 2020-10-29 DIAGNOSIS — M54.2 NECK PAIN: ICD-10-CM

## 2020-10-29 DIAGNOSIS — M54.12 CERVICAL RADICULOPATHY: ICD-10-CM

## 2020-10-29 PROCEDURE — 76882 US LMTD JT/FCL EVL NVASC XTR: CPT | Mod: RT | Performed by: PHYSICAL MEDICINE & REHABILITATION

## 2020-10-29 PROCEDURE — 99205 OFFICE O/P NEW HI 60 MIN: CPT | Mod: 25 | Performed by: PHYSICAL MEDICINE & REHABILITATION

## 2020-10-29 RX ORDER — ACETAMINOPHEN 500 MG
1000 TABLET ORAL 3 TIMES DAILY PRN
Qty: 180 TAB | Refills: 6 | Status: SHIPPED | OUTPATIENT
Start: 2020-10-29

## 2020-10-29 RX ORDER — CYCLOBENZAPRINE HCL 10 MG
5-10 TABLET ORAL 3 TIMES DAILY PRN
Qty: 90 TAB | Refills: 3 | Status: SHIPPED | OUTPATIENT
Start: 2020-10-29 | End: 2021-06-25

## 2020-10-29 ASSESSMENT — PAIN SCALES - GENERAL: PAINLEVEL: 7=MODERATE-SEVERE PAIN

## 2020-10-29 ASSESSMENT — PATIENT HEALTH QUESTIONNAIRE - PHQ9
SUM OF ALL RESPONSES TO PHQ QUESTIONS 1-9: 5
CLINICAL INTERPRETATION OF PHQ2 SCORE: 2
5. POOR APPETITE OR OVEREATING: 1 - SEVERAL DAYS

## 2020-10-29 ASSESSMENT — FIBROSIS 4 INDEX: FIB4 SCORE: 0.32

## 2020-10-29 NOTE — Clinical Note
Dear Dr Chin,     Thank you for the referral of Chaitanya Kelly.      Please see my note for more details       Should you have any questions or concerns please do not hesitate to contact me.    Roderick Best M.D.

## 2020-10-29 NOTE — Clinical Note
Dear KAYLEY LightAEDUARDO. ,     Thank you for the referral of Chaitanya Kelly.      Please see my note for more details       Should you have any questions or concerns please do not hesitate to contact me.    Roderick Best M.D.

## 2020-11-05 ENCOUNTER — OFFICE VISIT (OUTPATIENT)
Dept: MEDICAL GROUP | Facility: LAB | Age: 45
End: 2020-11-05
Payer: COMMERCIAL

## 2020-11-05 ENCOUNTER — HOSPITAL ENCOUNTER (OUTPATIENT)
Dept: LAB | Facility: MEDICAL CENTER | Age: 45
End: 2020-11-05
Attending: PHYSICIAN ASSISTANT
Payer: COMMERCIAL

## 2020-11-05 VITALS
OXYGEN SATURATION: 94 % | TEMPERATURE: 99.1 F | RESPIRATION RATE: 18 BRPM | HEART RATE: 84 BPM | DIASTOLIC BLOOD PRESSURE: 80 MMHG | HEIGHT: 74 IN | BODY MASS INDEX: 36.8 KG/M2 | SYSTOLIC BLOOD PRESSURE: 142 MMHG

## 2020-11-05 DIAGNOSIS — D72.829 LEUKOCYTOSIS, UNSPECIFIED TYPE: ICD-10-CM

## 2020-11-05 DIAGNOSIS — I10 ESSENTIAL HYPERTENSION: ICD-10-CM

## 2020-11-05 LAB
ANION GAP SERPL CALC-SCNC: 13 MMOL/L (ref 7–16)
BASOPHILS # BLD AUTO: 0.8 % (ref 0–1.8)
BASOPHILS # BLD: 0.07 K/UL (ref 0–0.12)
BUN SERPL-MCNC: 14 MG/DL (ref 8–22)
CALCIUM SERPL-MCNC: 9.6 MG/DL (ref 8.5–10.5)
CHLORIDE SERPL-SCNC: 102 MMOL/L (ref 96–112)
CO2 SERPL-SCNC: 22 MMOL/L (ref 20–33)
CREAT SERPL-MCNC: 1.03 MG/DL (ref 0.5–1.4)
EOSINOPHIL # BLD AUTO: 0.11 K/UL (ref 0–0.51)
EOSINOPHIL NFR BLD: 1.3 % (ref 0–6.9)
ERYTHROCYTE [DISTWIDTH] IN BLOOD BY AUTOMATED COUNT: 45 FL (ref 35.9–50)
GLUCOSE SERPL-MCNC: 78 MG/DL (ref 65–99)
HCT VFR BLD AUTO: 48.8 % (ref 42–52)
HGB BLD-MCNC: 15.8 G/DL (ref 14–18)
IMM GRANULOCYTES # BLD AUTO: 0.04 K/UL (ref 0–0.11)
IMM GRANULOCYTES NFR BLD AUTO: 0.5 % (ref 0–0.9)
LYMPHOCYTES # BLD AUTO: 1.65 K/UL (ref 1–4.8)
LYMPHOCYTES NFR BLD: 19.9 % (ref 22–41)
MCH RBC QN AUTO: 27.9 PG (ref 27–33)
MCHC RBC AUTO-ENTMCNC: 32.4 G/DL (ref 33.7–35.3)
MCV RBC AUTO: 86.1 FL (ref 81.4–97.8)
MONOCYTES # BLD AUTO: 0.74 K/UL (ref 0–0.85)
MONOCYTES NFR BLD AUTO: 8.9 % (ref 0–13.4)
NEUTROPHILS # BLD AUTO: 5.69 K/UL (ref 1.82–7.42)
NEUTROPHILS NFR BLD: 68.6 % (ref 44–72)
NRBC # BLD AUTO: 0 K/UL
NRBC BLD-RTO: 0 /100 WBC
PLATELET # BLD AUTO: 329 K/UL (ref 164–446)
PMV BLD AUTO: 9.9 FL (ref 9–12.9)
POTASSIUM SERPL-SCNC: 3.6 MMOL/L (ref 3.6–5.5)
RBC # BLD AUTO: 5.67 M/UL (ref 4.7–6.1)
SODIUM SERPL-SCNC: 137 MMOL/L (ref 135–145)
WBC # BLD AUTO: 8.3 K/UL (ref 4.8–10.8)

## 2020-11-05 PROCEDURE — 80048 BASIC METABOLIC PNL TOTAL CA: CPT

## 2020-11-05 PROCEDURE — 36415 COLL VENOUS BLD VENIPUNCTURE: CPT

## 2020-11-05 PROCEDURE — 85025 COMPLETE CBC W/AUTO DIFF WBC: CPT

## 2020-11-05 PROCEDURE — 99212 OFFICE O/P EST SF 10 MIN: CPT | Performed by: PHYSICIAN ASSISTANT

## 2020-11-05 NOTE — ASSESSMENT & PLAN NOTE
Follow up  Pt was started on Amlodipine.   Doing well on current regimen.   No side effects.   BP has improved greatly since last visit.     Denies chest pain or sob  No leg swelling  Reports a worsening of essential tremor, though he is unsure if this is related to the amlodipine.

## 2020-11-05 NOTE — PROGRESS NOTES
"Subjective:   Chaitanya Kelly is a 45 y.o. male here today for BP check     Essential hypertension  Follow up  Pt was started on Amlodipine.   Doing well on current regimen.   No side effects.   BP has improved greatly since last visit.     Denies chest pain or sob  No leg swelling  Reports a worsening of essential tremor, though he is unsure if this is related to the amlodipine.      Current medicines (including changes today)  Current Outpatient Medications   Medication Sig Dispense Refill   • cyclobenzaprine (FLEXERIL) 10 mg Tab Take 0.5-1 Tabs by mouth 3 times a day as needed for Mild Pain, Moderate Pain or Muscle Spasms. 90 Tab 3   • acetaminophen (TYLENOL) 500 MG Tab Take 2 Tabs by mouth 3 times a day as needed (pain.  Do not exceed 3000 mg of total acetaminophen per day). 180 Tab 6   • amLODIPine (NORVASC) 10 MG Tab Take 1 Tab by mouth every day. 30 Tab 0   • meclizine (ANTIVERT) 25 MG Tab Take 1 Tab by mouth 3 times a day as needed. 30 Tab 3   • buPROPion (WELLBUTRIN XL) 150 MG XL tablet Take 1 Tab by mouth 2 Times a Day. 90 Tab 0   • lamoTRIgine (LAMICTAL) 100 MG Tab TAKE 1 TABLET BY MOUTH ONCE DAILY IN THE MORNING 90 Tab 1   • lithium CR (ESKALITH CR) 450 MG Tab CR TAKE 1 TABLET BY MOUTH TWICE DAILY 180 Tab 1     No current facility-administered medications for this visit.      He  has a past medical history of Anxiety, ASTHMA, Bipolar 2 disorder, Bipolar disorder ([facility]), Headache(784.0), Migraine, Pain (10/10/2017), and Seizure ([facility]) (1980).    ROS   No chest pain, no shortness of breath, no abdominal pain       Objective:     /80   Pulse 84   Temp 37.3 °C (99.1 °F)   Resp 18   Ht 1.88 m (6' 2\")   SpO2 94%  Body mass index is 36.8 kg/m².   Physical Exam:  Constitutional: Alert, no distress.  Skin: Warm, dry, good turgor, no rashes in visible areas.  Eye: Equal, round and reactive, conjunctiva clear, lids normal.  Neck: Trachea midline,   Respiratory: Unlabored respiratory effort, lungs clear " to auscultation, no wheezes, no ronchi.  Cardiovascular: Normal S1, S2, no murmur, no edema.  Psych: Alert and oriented x3, normal affect and mood.        Assessment and Plan:   The following treatment plan was discussed    1. Essential hypertension  Follow up, doing well on Amlodipine.   No side effects  Continue current regimen w/o changes.   Follow up in 6 months, sooner if needed.   - Basic Metabolic Panel; Future    2. Leukocytosis, unspecified type  - CBC WITH DIFFERENTIAL; Future      Followup: Return in about 6 months (around 5/5/2021), or if symptoms worsen or fail to improve.       Cesilia Dotson P.A.-C.  Supervising MD: Dr. Holley Norton MD  11/05/20

## 2020-11-09 ENCOUNTER — PHYSICAL THERAPY (OUTPATIENT)
Dept: PHYSICAL THERAPY | Facility: REHABILITATION | Age: 45
End: 2020-11-09
Attending: PHYSICAL MEDICINE & REHABILITATION
Payer: COMMERCIAL

## 2020-11-09 DIAGNOSIS — M54.12 CERVICAL RADICULOPATHY: ICD-10-CM

## 2020-11-09 PROCEDURE — 97161 PT EVAL LOW COMPLEX 20 MIN: CPT

## 2020-11-09 PROCEDURE — 97110 THERAPEUTIC EXERCISES: CPT

## 2020-11-09 NOTE — OP THERAPY EVALUATION
Outpatient Physical Therapy  INITIAL EVALUATION    [facility] [place]  [address]  [facility] [address]  Phone:  [phone]  Fax:  [phone]    Date of Evaluation: 11/09/2020    Patient: Chaitanya Kelly  YOB: 1975  MRN: [id number]     Referring Provider: Roderick Best M.D.  [address]  [address]  [place] [address]   Referring Diagnosis Neuralgia [M79.2];Chronic pain of right upper extremity [M79.601, G89.29];Numbness and tingling of right hand [R20.0, R20.2];Right elbow pain [M25.521];Chronic right shoulder pain [M25.511, G89.29];Neck pain [M54.2];Cervical radiculopathy [M54.12];Biceps tendinitis of right upper extremity [M75.21];Right carpal tunnel syndrome [G56.01];Right lateral epicondylitis [M77.11]     Time Calculation    Start time: 0830  Stop time: 0930 Time Calculation (min): 60 minutes     Chief Complaint: shoulder pain, numbness/ tingling   Visit Diagnoses     ICD-10-CM   1. Cervical radiculopathy  M54.12     Subjective:   History of Present Illness:     Mechanism of injury:  Chaitanya Kelly is a 45 y.o. male that presents to therapy with R shoulder and neck pain. He states that symptoms came one with insidious onset. She has recently had numbness and tingling along his R arm as and R hand as well. Notes constant decreased sensation along R thumb. Patient denies fevers/chills, numbness/weakness of upper extremities, dysphagia, nausea, diplopia, ataxia, and dysarthria. He notes that he has been dizzy with change in positions. Migraines have been occurring about once every 2 weeks since he was a teenager.      Aggravating factors: (left turn, looking up (previously), doctors exam, driving    Relieving factors: looking down, sit in recliner    ADL limitations: Difficulty with R UE use, constant pain.         Past Medical History:   Diagnosis Date   • Anxiety    • ASTHMA    • Bipolar 2 disorder    • Bipolar disorder (HCC)     depression  and anxiety   • Headache(784.0)    • Migraine    • Pain 10/10/2017    umbilical pain   • Seizure (HCC) 1980    related to Ritalin     Past Surgical History:   Procedure Laterality Date   • UMBILICAL HERNIA REPAIR N/A 10/18/2017    Procedure: UMBILICAL HERNIA REPAIR;  Surgeon: Chinedu Kamara M.D.;  Location: SURGERY [facility];  Service: General     Social History     Tobacco Use   • Smoking status: Former Smoker     Types: Cigarettes     Quit date: 2006     Years since quittin.8   • Smokeless tobacco: Never Used   • Tobacco comment: off and on from age [age]   Substance Use Topics   • Alcohol use: No     Family and Occupational History     Socioeconomic History   • Marital status:      Spouse name: Not on file   • Number of children: Not on file   • Years of education: Not on file   • Highest education level: Not on file   Occupational History   • Not on file       Objective     Shoulder Screen    Shoulder active range of motion within functional limits.  Shoulder strength within functional limits.  Shoulder joint mobility within functional limits.    Neurological Testing     Reflexes   Left   Biceps (C5/C6): trace (1+)  Brachioradialis (C6): trace (1+)  Triceps (C7): trace (1+)  Delgadillo's reflex: negative    Right   Biceps (C5/C6): trace (1+)  Brachioradialis (C6): trace (1+)  Triceps (C7): trace (1+)  Delgadillo's reflex: negative    Active Range of Motion     Cervical Spine   Flexion: within functional limits  Extension: within functional limits (with increased pain to R shoulder)  Retraction: within functional limits (with increased pain to R shoulder)  Left lateral flexion: within functional limits (35 degrees)  Right lateral flexion: decreased (30 degrees)  Left rotation: decreased (55 degrees reports of tightness)  Right rotation: decreased (60 degrees reports of pain to R shoulder)    Strength:    Cervical Spine   Deep neck flexors: 4  Deep neck extensors: 4    Upper extremities   Left  upper extremity strength within functional limits  Right upper extremity strength within functional limits    Left Wrist/Hand    (2nd hand position)     Trial 1: 100    Right Wrist/Hand    (2nd hand position)     Trial 1: 90    Tests   Cervical spine     Right spine   Negative alar ligament integrity, Sharp-Sidney test and vertebral artery test.     Right Shoulder   Positive Spurling's sign.         Therapeutic Exercises (CPT 75117):       Therapeutic Exercise Summary: HEP instruction/performance and development. Handout provided and exercises located below:   Access Code: [email or web address]  URL: [email or web address]  Date: 11/09/2020  Prepared by: Ty Craig    Exercises        Median Nerve Flossing - 12 reps - 3-4x daily      yes, no maybe so - 3 reps - 15x daily      Time-based treatments/modalities:    Physical Therapy Timed Treatment Charges  Therapeutic exercise minutes (CPT 87913): 10 minutes      Assessment, Response and Plan:   Assessment details:  Chaitanya Kelly is a 45 y.o. male with signs and symptoms consistent with R cervical radiculopathy without strength deficits. He requires skilled physical therapy intervention to decrease pain, increase range of motion, increase functional mobility, improve ADL completion and establish a home exercise program.  Goals:   Short Term Goals:   1. Patient will be Independent with prescribed Home Exercise Program (HEP) and will be able to demonstrate exercises without cues for improved overall symptoms/activity tolerance.   2. Pt will improve ability to turn head/ rotation by 5 degrees in each direction without increased pain for improved ability to turn head while driving or carrying conversation.  Short term goal time span:  2-4 weeks      Long Term Goals:    3. Pt will improve ability to move head/neck in all positions without increased pain >2/10.  4. Pt will improve NDI score to less than 35% indicative of improved function and reduced perceived  disability.  Long term goal time span:  4-6 weeks    Plan:   Planned therapy interventions:  Therapeutic Exercise (CPT 74574), Therapeutic Activities (CPT 34050), Manual Therapy (CPT 77348), Neuromuscular Re-education (CPT 31737), E Stim Unattended (CPT 34459) and Gait Training (CPT 83710)  Frequency: 1-2x/week.  Duration in weeks:  6  Discussed with:  Patient    Functional Assessment Used  PT Functional Assessment Tool Used: DASH  PT Functional Assessment Score: 44.83%     Referring provider co-signature:  I have reviewed this plan of care and my co-signature certifies the need for services.    Certification Period: 11/09/2020 to  12/21/20    Physician Signature: ________________________________ Date: ______________

## 2020-11-13 ENCOUNTER — PHYSICAL THERAPY (OUTPATIENT)
Dept: PHYSICAL THERAPY | Facility: REHABILITATION | Age: 45
End: 2020-11-13
Attending: PHYSICAL MEDICINE & REHABILITATION
Payer: COMMERCIAL

## 2020-11-13 DIAGNOSIS — M54.12 CERVICAL RADICULOPATHY: ICD-10-CM

## 2020-11-13 PROCEDURE — 97110 THERAPEUTIC EXERCISES: CPT

## 2020-11-14 NOTE — OP THERAPY DAILY TREATMENT
Outpatient Physical Therapy  DAILY TREATMENT     [facility] Physical [facility] [place]  [address], [address]  [facility] [address]  Phone:  [phone]  Fax:  [phone]    Date: 11/13/2020    Patient: Chaitanya Kelly  YOB: 1975  MRN: [id number]     Time Calculation    Start time: 1000  Stop time: 1030 Time Calculation (min): 30 minutes         Chief Complaint: Neck Problem    Visit #: 2    SUBJECTIVE:  Patient reports that the exercise did cause him some dizziness. Notes that this only lasted for 2-3 seconds up to 30 seconds.     OBJECTIVE:  Current objective measures:   Traction with flexion grade I-II : decreased symptoms  Extension / retraction : decrease ; symptoms          Therapeutic Exercises (CPT 28007):     1. Postural edu , x5min    2. UBE, x3min    3. Seated with lumbar roll, x3min edu    4. Supine with two pillows, x4min , decrease; better    5. Supine with 1 pillow, x2min , produce    6. Supine two pillows light ret, x5    7. Supine two pillows rot/ yes,no, x20      Time-based treatments/modalities:    Physical Therapy Timed Treatment Charges  Therapeutic exercise minutes (CPT 71434): 30 minutes      Pain rating (1-10) before treatment:  4  Pain rating (1-10) after treatment:  2    ASSESSMENT:   Response to treatment: Patient responded fair to therapy. Patient does present consistent with more stenotic change in the cervical spine vs discal.     PLAN/RECOMMENDATIONS:   Plan for treatment: therapy treatment to continue next visit.  Planned interventions for next visit: continue with current treatment.

## 2020-11-17 ENCOUNTER — APPOINTMENT (OUTPATIENT)
Dept: PHYSICAL THERAPY | Facility: REHABILITATION | Age: 45
End: 2020-11-17
Attending: PHYSICAL MEDICINE & REHABILITATION
Payer: COMMERCIAL

## 2020-11-19 ENCOUNTER — PHYSICAL THERAPY (OUTPATIENT)
Dept: PHYSICAL THERAPY | Facility: REHABILITATION | Age: 45
End: 2020-11-19
Attending: PHYSICAL MEDICINE & REHABILITATION
Payer: COMMERCIAL

## 2020-11-19 DIAGNOSIS — M54.12 CERVICAL RADICULOPATHY: ICD-10-CM

## 2020-11-19 PROCEDURE — 97110 THERAPEUTIC EXERCISES: CPT

## 2020-11-19 NOTE — OP THERAPY DAILY TREATMENT
Outpatient Physical Therapy  DAILY TREATMENT     [facility] Out[facility] Physical Therapy [place]  [address], [address]  [facility] [address]  Phone:  [phone]  Fax:  [phone]    Date: 11/19/2020    Patient: Chaitanya Kelly  YOB: 1975  MRN: [id number]     Time Calculation    Start time: 0930  Stop time: 1000 Time Calculation (min): 30 minutes         Chief Complaint: neck problem  Visit #: 3    SUBJECTIVE:  Pt reports had a migraine for 4 days that prevented him from doig any exercises. Notes that he thinks that moving his head too fast makes him dizzy.     OBJECTIVE:  Current objective measures:           Therapeutic Exercises (CPT 39653):     1. Seated pulleys, x 3min    2. Low row, black band x 25 with scap retraction    3. Shoulder extension, black x 20    4. Passive shoulder extension with stick, x 25    5. [place] seated with 1/2 roller, lvl 0 x 20    6. Occ float, flexion and rotation x 2min each      Time-based treatments/modalities:    Physical Therapy Timed Treatment Charges  Therapeutic exercise minutes (CPT 60926): 30 minutes      Pain rating (1-10) before treatment:  4  Pain rating (1-10) after treatment:  4    ASSESSMENT:   Response to treatment: Symptoms exacerbated with little use or movement. No reduction observed with any particular reduction. Non-compliant with HEP  Thus far.     PLAN/RECOMMENDATIONS:   Plan for treatment: therapy treatment to continue next visit.  Planned interventions for next visit: continue with current treatment.

## 2020-11-24 ENCOUNTER — PHYSICAL THERAPY (OUTPATIENT)
Dept: PHYSICAL THERAPY | Facility: REHABILITATION | Age: 45
End: 2020-11-24
Attending: PHYSICAL MEDICINE & REHABILITATION
Payer: COMMERCIAL

## 2020-11-24 DIAGNOSIS — M54.12 CERVICAL RADICULOPATHY: ICD-10-CM

## 2020-11-24 PROCEDURE — 97110 THERAPEUTIC EXERCISES: CPT

## 2020-11-24 NOTE — OP THERAPY DAILY TREATMENT
Outpatient Physical Therapy  DAILY TREATMENT     [facility] [place]  [address], [address]  [facility] [address]  Phone:  [phone]  Fax:  [phone]    Date: 11/24/2020    Patient: Chaitanya Kelly  YOB: 1975  MRN: [id number]     Time Calculation    Start time: 1039  Stop time: 1100 Time Calculation (min): 21 minutes         Chief Complaint: neck problem  Visit #: 4    SUBJECTIVE:  No reported improvement. States that the exercises are getting done sometimes. Comes to appointment 9 minutes late.     OBJECTIVE:  Current objective measures: Cervical AROM: flexion 35 degrees rotation 45 degrees bilaterally, sb 25 degrees bilaterally. Extension 40 degrees.           Therapeutic Exercises (CPT 15208):     1. Seated pulleys, x 3min    2. Low row, black band x 25 with scap retraction    3. Shoulder extension, black x 20    4. Passive shoulder extension with stick, x 25    5. ER and IR walkouts, red band x 20 ea    6. Ball rolls on table, x20      Time-based treatments/modalities:    Physical Therapy Timed Treatment Charges  Therapeutic exercise minutes (CPT 01062): 21 minutes      Pain rating (1-10) before treatment:  4  Pain rating (1-10) after treatment:  4    ASSESSMENT:   Response to treatment: Symptoms still  exacerbated with little use or movement. ROM at cervical spine reducing/ not demonstrating improvement. No reduction observed with any particular movement at the cervical spine. Non-compliant with HEP thus far.     PLAN/RECOMMENDATIONS:   Plan for treatment: therapy treatment to continue next visit.  Planned interventions for next visit: continue with current treatment.

## 2020-11-25 ENCOUNTER — OFFICE VISIT (OUTPATIENT)
Dept: MEDICAL GROUP | Facility: LAB | Age: 45
End: 2020-11-25
Payer: COMMERCIAL

## 2020-11-25 VITALS
HEART RATE: 84 BPM | BODY MASS INDEX: 36.7 KG/M2 | OXYGEN SATURATION: 99 % | HEIGHT: 74 IN | WEIGHT: 286 LBS | SYSTOLIC BLOOD PRESSURE: 150 MMHG | TEMPERATURE: 99.4 F | DIASTOLIC BLOOD PRESSURE: 90 MMHG | RESPIRATION RATE: 18 BRPM

## 2020-11-25 DIAGNOSIS — R23.9 SKIN CHANGE: ICD-10-CM

## 2020-11-25 DIAGNOSIS — I10 ESSENTIAL HYPERTENSION: ICD-10-CM

## 2020-11-25 DIAGNOSIS — M54.2 NECK PAIN: ICD-10-CM

## 2020-11-25 PROCEDURE — 99214 OFFICE O/P EST MOD 30 MIN: CPT | Performed by: PHYSICIAN ASSISTANT

## 2020-11-25 RX ORDER — AMLODIPINE BESYLATE 10 MG/1
10 TABLET ORAL DAILY
Qty: 30 TAB | Refills: 1 | Status: SHIPPED | OUTPATIENT
Start: 2020-11-25 | End: 2021-02-01

## 2020-11-25 RX ORDER — TRIAMCINOLONE ACETONIDE 1 MG/G
CREAM TOPICAL
Qty: 80 G | Refills: 0 | Status: SHIPPED | OUTPATIENT
Start: 2020-11-25 | End: 2021-03-10

## 2020-11-25 ASSESSMENT — FIBROSIS 4 INDEX: FIB4 SCORE: 0.33

## 2020-11-25 NOTE — PROGRESS NOTES
"Subjective:   Chaitanya Kelly is a 45 y.o. male here today for BP check, update on PT regarding neck pain and skin change.     Skin change  New to me;  New skin change  Was having some itching  Reports it a new skin discoloration.   Denies pain.   No bleeding lesion.   \"felt like a scab that wouldn't heal.\"    Essential hypertension  Follow up.   Pt was started on Amlodipine 10mg at last visit.   BP is still above goal, though has improved.   Pt is also expressing that he is having a dry mouth and leg swelling with the medication.     We have been unable to try different class of medication given that he is also using Lithium for Bipolar disorder, several other classes of medication have a potential interaction with lithium.     No chest pain, sob  Denies vision changes  Chronic Migraines and headaches.   Ongoing chronic pain and stress relating to being off work which may also be causing increased BP readings.     Neck pain  Ongoing neck pain.   MRI has been reviewed.   Pt is currently seeing PT 2x/week   Reports that he is not getting any better; does have follow up with pain management, has not seen Neuro since before seeing pain management.     Ongoing radicular symptoms into the right shoulder and into the R arm which is consistent with the C5-6 and C6-7 impingements in the neck.      Current medicines (including changes today)  Current Outpatient Medications   Medication Sig Dispense Refill   • triamcinolone acetonide (KENALOG) 0.1 % Cream Apply to affected area BID x7 days 80 g 0   • amLODIPine (NORVASC) 10 MG Tab Take 1 Tab by mouth every day. 30 Tab 1   • cyclobenzaprine (FLEXERIL) 10 mg Tab Take 0.5-1 Tabs by mouth 3 times a day as needed for Mild Pain, Moderate Pain or Muscle Spasms. 90 Tab 3   • acetaminophen (TYLENOL) 500 MG Tab Take 2 Tabs by mouth 3 times a day as needed (pain.  Do not exceed 3000 mg of total acetaminophen per day). 180 Tab 6   • meclizine (ANTIVERT) 25 MG Tab Take 1 Tab by mouth " "3 times a day as needed. 30 Tab 3   • buPROPion (WELLBUTRIN XL) 150 MG XL tablet Take 1 Tab by mouth 2 Times a Day. 90 Tab 0   • lamoTRIgine (LAMICTAL) 100 MG Tab TAKE 1 TABLET BY MOUTH ONCE DAILY IN THE MORNING 90 Tab 1   • lithium CR (ESKALITH CR) 450 MG Tab CR TAKE 1 TABLET BY MOUTH TWICE DAILY 180 Tab 1     No current facility-administered medications for this visit.      He  has a past medical history of Anxiety, ASTHMA, Bipolar 2 disorder, Bipolar disorder (HCC), Headache(784.0), Migraine, Pain (10/10/2017), and Seizure ([facility]) (1980).    ROS   +skin change   +neck pain, numbness/tingling into the right arm  +chronic headache.   No chest pain, no shortness of breath, no abdominal pain       Objective:     /90   Pulse 84   Temp 37.4 °C (99.4 °F) (Temporal)   Resp 18   Ht 1.88 m (6' 2\")   Wt (!) 129.7 kg (286 lb)   SpO2 99%  Body mass index is 36.72 kg/m².     Physical Exam:  Constitutional: Alert, no distress.  Skin: Warm, dry, good turgor, quarter sized darkened lesion on superior region of L buttocks, non tender to the touch. Just distal to the dark lesion, there is a grouping of erythematous, raised lesions in a line; non tender to the touch.   Eye: Equal, round and reactive, conjunctiva clear, lids normal.  Neck: Trachea midline, moves freely with subjective pain with ROM.   Respiratory: Unlabored respiratory effort,  CV: Mild non pitting edema in b/l LE.   Psych: Alert and oriented x3, normal affect and mood.        Assessment and Plan:   The following treatment plan was discussed    1. Skin change  Unclear cause of the darkened lesion, I am suspicious that the grouped lesions are perhaps Herpes zoster, though he has not had any numbness/tingling/pain in the area of the rash, which has been present for about 1 week.   Will try topical steroid to see if this help to resolve the rashes, though would appreciate consult from Derm; referral provided today.   Pt was educated on use and SEs of " medication.  Continue to monitor.   - triamcinolone acetonide (KENALOG) 0.1 % Cream; Apply to affected area BID x7 days  Dispense: 80 g; Refill: 0  - REFERRAL TO DERMATOLOGY    2. Essential hypertension  Refill as written.   Will reach out to vascular medicine to see what other medications can be used w/ Lithium.   Pt was educated on use and SEs of medication.  Continue to monitor BP.   - amLODIPine (NORVASC) 10 MG Tab; Take 1 Tab by mouth every day.  Dispense: 30 Tab; Refill: 1    3. Neck pain  Ongoing, unfortunately keeping him out of work   I have extended his FMLA today until 01/01/2021; we did discuss having a goal to return to work at that time   STRONGLY recommend that he follow up with pain management, possibly needs injections. Cervical MRI does not suggest need for surgical intervention at this time.   Continue PT and home exercises.   To follow up with the pt regarding this AFTER he sees Dr. Best.         Followup: Return in about 2 months (around 1/25/2021), or if symptoms worsen or fail to improve.       Cesilia Dotson P.A.-C.  Supervising MD: Dr. Holley Norton MD  11/25/20

## 2020-11-25 NOTE — ASSESSMENT & PLAN NOTE
Follow up.   Pt was started on Amlodipine 10mg at last visit.   BP is still above goal, though has improved.   Pt is also expressing that he is having a dry mouth and leg swelling with the medication.     We have been unable to try different class of medication given that he is also using Lithium for Bipolar disorder, several other classes of medication have a potential interaction with lithium.     No chest pain, sob  Denies vision changes  Chronic Migraines and headaches.   Ongoing chronic pain and stress relating to being off work which may also be causing increased BP readings.

## 2020-11-25 NOTE — ASSESSMENT & PLAN NOTE
"New to me;  New skin change  Was having some itching  Reports it a new skin discoloration.   Denies pain.   No bleeding lesion.   \"felt like a scab that wouldn't heal.\"  "

## 2020-11-25 NOTE — ASSESSMENT & PLAN NOTE
Ongoing neck pain.   MRI has been reviewed.   Pt is currently seeing PT 2x/week   Reports that he is not getting any better; does have follow up with pain management, has not seen Neuro since before seeing pain management.     Ongoing radicular symptoms into the right shoulder and into the R arm which is consistent with the C5-6 and C6-7 impingements in the neck.

## 2020-11-30 ENCOUNTER — TELEPHONE (OUTPATIENT)
Dept: PHYSICAL MEDICINE AND REHAB | Facility: MEDICAL CENTER | Age: 45
End: 2020-11-30

## 2020-11-30 NOTE — TELEPHONE ENCOUNTER
ARIANA to call us back to schedule with Dr. Best due to pain even he had PT and Physical Therapist recommend to him to be seen back with Dr. Best.    Thank you  [place]

## 2020-12-01 ENCOUNTER — APPOINTMENT (OUTPATIENT)
Dept: PHYSICAL THERAPY | Facility: REHABILITATION | Age: 45
End: 2020-12-01
Attending: PHYSICAL MEDICINE & REHABILITATION
Payer: COMMERCIAL

## 2020-12-03 ENCOUNTER — OFFICE VISIT (OUTPATIENT)
Dept: PHYSICAL MEDICINE AND REHAB | Facility: MEDICAL CENTER | Age: 45
End: 2020-12-03
Payer: COMMERCIAL

## 2020-12-03 VITALS
TEMPERATURE: 98.4 F | SYSTOLIC BLOOD PRESSURE: 124 MMHG | HEART RATE: 85 BPM | HEIGHT: 74 IN | OXYGEN SATURATION: 97 % | BODY MASS INDEX: 37.77 KG/M2 | WEIGHT: 294.31 LBS | DIASTOLIC BLOOD PRESSURE: 80 MMHG

## 2020-12-03 DIAGNOSIS — M79.601 CHRONIC PAIN OF RIGHT UPPER EXTREMITY: ICD-10-CM

## 2020-12-03 DIAGNOSIS — M79.2 NEURALGIA: ICD-10-CM

## 2020-12-03 DIAGNOSIS — M54.12 CERVICAL RADICULOPATHY: ICD-10-CM

## 2020-12-03 DIAGNOSIS — G89.29 CHRONIC PAIN OF RIGHT UPPER EXTREMITY: ICD-10-CM

## 2020-12-03 PROCEDURE — 99214 OFFICE O/P EST MOD 30 MIN: CPT | Performed by: PHYSICAL MEDICINE & REHABILITATION

## 2020-12-03 ASSESSMENT — PATIENT HEALTH QUESTIONNAIRE - PHQ9
CLINICAL INTERPRETATION OF PHQ2 SCORE: 2
5. POOR APPETITE OR OVEREATING: 1 - SEVERAL DAYS
SUM OF ALL RESPONSES TO PHQ QUESTIONS 1-9: 6

## 2020-12-03 ASSESSMENT — FIBROSIS 4 INDEX: FIB4 SCORE: 0.33

## 2020-12-03 ASSESSMENT — PAIN SCALES - GENERAL: PAINLEVEL: 7=MODERATE-SEVERE PAIN

## 2020-12-03 NOTE — PATIENT INSTRUCTIONS
Your procedure will be at the [facility] special procedure suite.    [facility] [place] [address]       PRE-PROCEDURE INSTRUCTIONS  You may take your regular medications except:   · No Anti-inflammatories 5 days prior to your procedure. Anti-inflammatories include medicines such as  ibuprofen (Motrin, Advil), Excedrin, Naproxen (Aleve, Anaprox, Naprelan, Naprosyn), Celecoxib (Celebrex), Diclofenac (Voltaren-XR tab), and Meloxicam (Mobic).   · You can take the remainder of your pain medications as prescribed.   · If you are having a diagnostic procedure such as a medial branch block, do not use her pain meds on the day of the procedure  · No Glucophage or Metformin 24 hours before your procedure. You may resume next day after your procedure.  · Call the physiatry office if you are taking or prescribed anti-biotics within five days of procedure.  · Please ask provider if you are taking any new diabetes medication.  · CONTINUE TAKING BLOOD PRESSURE MEDICATIONS AS PRESCRIBED.  · Pain medications will not be prescribed on the procedure day. Procedural pain medication may be used by your provider   · Call your doctor's office performing the procedure if you have a fever, chills, rash or new illness prior to your procedure    Anticoagulation/antiplatelet medications  No Blood thinning medications such as Coumadin or Plavix 5 days prior to procedure unless your doctor said to continue these medications. Call your doctor if a new medication is prescribed in this class.     Restrictions for eating before procedure:   · If you are getting procedural sedation, then do not eat to for 8 hours prior to procedure appointment time. Do not drink fluids for four hours prior to your procedure time.   · If you are not having procedural sedation, then Skip the meal prior to your procedure. If you have a morning procedure then skip breakfast. If you have an afternoon procedure then skip lunch.   · You may drink clear  liquids up to 2 hours prior to your procedure  · You must have a  the day of procedure to accompany you home.      POST PROCEDURE INSTRUCTIONS   · No heavy lifting, strenuous bending or strenuous exercise for 3 days after your procedure.  · No hot tubs, baths, swimming for 3 days after your procedure  · You can remove the bandage the day after the procedure.  · IF YOU RECEIVED A STEROID INJECTION. PLEASE NOTE THAT THERE MAY BE A DELAY FOR THE INJECTION TO START WORKING, THE DELAY MAY BE UP TO TWO WEEKS. IF YOU HAVE DIABETES, PLEASE NOTE THAT YOUR SUGAR LEVELS MAY BE ELEVATED FOR 1-2 DAYS AFTER A STEROID INJECTION.  THE STEROID MAY CAUSE TEMPORARY SYMPTOMS WHICH USUALLY RESOLVE ON THEIR OWN WITHIN 1 TO 2 DAYS INCLUDING FACIAL FLUSHING OR A FEELING OF WARMTH ON THE FACE, TEMPORARY INCREASES IN BLOOD SUGAR, INSOMNIA, INCREASED HUNGER  · IF YOU EXPERIENCE PROLONGED WEAKNESS LONGER THAN ONE DAY, BOWEL OR BLADDER INCONTINENCE THEN PLEASE CALL THE PHYSIATRY OFFICE.  · Your leg may feel heavy, weak and numb for up to 1-2 days. Be very careful walking.   ·  You may resume normal activities 3 days after procedure.

## 2020-12-03 NOTE — PROGRESS NOTES
Follow up patient note  Interventional spine and sports physiatry, Physical medicine rehabilitation      Chief complaint:   Chief Complaint   Patient presents with   • Follow-Up     Neck pain          HISTORY    Please see new patient note by Dr Best,  for more details.     HPI  Patient identification: Chaitanya Kelly ,  1975,   With Diagnoses of Cervical radiculopathy, Neuralgia, and Chronic pain of right upper extremity were pertinent to this visit.       The patient has been going to physical therapy but he states that this has been worsening his pain with doing his physical therapy and his home exercise program.  The patient pain is severe right-sided neck pain radiating down the arm with associated numbness and tingling sensation.  He has no weakness.  This is constant.  9-10/10 in intensity.  He has a difficult time telling his shoulder pain and elbow pain away from his neck pain as this is the most severe.    I reviewed multiple notes from physical therapy including most recent note from Ty Craig PT, DPT from 2020.       ROS Red Flags :   Fever, Chills, Sweats: Denies  Involuntary Weight Loss: Denies  Bowel/Bladder Incontinence: Denies  Saddle Anesthesia: Denies        PMHx:   Past Medical History:   Diagnosis Date   • Anxiety    • ASTHMA    • Bipolar 2 disorder    • Bipolar disorder (HCC)     depression and anxiety   • Headache(784.0)    • Migraine    • Pain 10/10/2017    umbilical pain   • Seizure (HCC) 1980    related to Ritalin       PSHx:   Past Surgical History:   Procedure Laterality Date   • UMBILICAL HERNIA REPAIR N/A 10/18/2017    Procedure: UMBILICAL HERNIA REPAIR;  Surgeon: Chinedu Kamara M.D.;  Location: SURGERY [facility];  Service: General       Family history   Family History   Problem Relation Age of Onset   • Heart Disease Father 41        heart attack   • Heart Disease Paternal Aunt    • Heart Disease Paternal Uncle    • Stroke Paternal Uncle    • Heart Disease  Paternal Grandfather    • Cancer Maternal Aunt    • Lung Cancer Maternal Aunt          Medications:   Outpatient Medications Marked as Taking for the 12/3/20 encounter (Office Visit) with Roderick Best M.D.   Medication Sig Dispense Refill   • triamcinolone acetonide (KENALOG) 0.1 % Cream Apply to affected area BID x7 days 80 g 0   • amLODIPine (NORVASC) 10 MG Tab Take 1 Tab by mouth every day. 30 Tab 1   • cyclobenzaprine (FLEXERIL) 10 mg Tab Take 0.5-1 Tabs by mouth 3 times a day as needed for Mild Pain, Moderate Pain or Muscle Spasms. 90 Tab 3   • acetaminophen (TYLENOL) 500 MG Tab Take 2 Tabs by mouth 3 times a day as needed (pain.  Do not exceed 3000 mg of total acetaminophen per day). 180 Tab 6   • meclizine (ANTIVERT) 25 MG Tab Take 1 Tab by mouth 3 times a day as needed. 30 Tab 3   • buPROPion (WELLBUTRIN XL) 150 MG XL tablet Take 1 Tab by mouth 2 Times a Day. 90 Tab 0   • lamoTRIgine (LAMICTAL) 100 MG Tab TAKE 1 TABLET BY MOUTH ONCE DAILY IN THE MORNING 90 Tab 1   • lithium CR (ESKALITH CR) 450 MG Tab CR TAKE 1 TABLET BY MOUTH TWICE DAILY 180 Tab 1        Current Outpatient Medications on File Prior to Visit   Medication Sig Dispense Refill   • triamcinolone acetonide (KENALOG) 0.1 % Cream Apply to affected area BID x7 days 80 g 0   • amLODIPine (NORVASC) 10 MG Tab Take 1 Tab by mouth every day. 30 Tab 1   • cyclobenzaprine (FLEXERIL) 10 mg Tab Take 0.5-1 Tabs by mouth 3 times a day as needed for Mild Pain, Moderate Pain or Muscle Spasms. 90 Tab 3   • acetaminophen (TYLENOL) 500 MG Tab Take 2 Tabs by mouth 3 times a day as needed (pain.  Do not exceed 3000 mg of total acetaminophen per day). 180 Tab 6   • meclizine (ANTIVERT) 25 MG Tab Take 1 Tab by mouth 3 times a day as needed. 30 Tab 3   • buPROPion (WELLBUTRIN XL) 150 MG XL tablet Take 1 Tab by mouth 2 Times a Day. 90 Tab 0   • lamoTRIgine (LAMICTAL) 100 MG Tab TAKE 1 TABLET BY MOUTH ONCE DAILY IN THE MORNING 90 Tab 1   • lithium CR (ESKALITH CR) 450  MG Tab CR TAKE 1 TABLET BY MOUTH TWICE DAILY 180 Tab 1     No current facility-administered medications on file prior to visit.          Allergies:   Allergies   Allergen Reactions   • Ibuprofen Unspecified     Interaction with Lithium   • Latex Itching   • Naprosyn [Naproxen] Unspecified     Interaction with Lithium       Social Hx:   Social History     Socioeconomic History   • Marital status:      Spouse name: Not on file   • Number of children: Not on file   • Years of education: Not on file   • Highest education level: Not on file   Occupational History   • Not on file   Social Needs   • Financial resource strain: Not on file   • Food insecurity     Worry: Not on file     Inability: Not on file   • Transportation needs     Medical: Not on file     Non-medical: Not on file   Tobacco Use   • Smoking status: Former Smoker     Types: Cigarettes     Quit date: 2006     Years since quittin.9   • Smokeless tobacco: Never Used   • Tobacco comment: off and on from age [age]   Substance and Sexual Activity   • Alcohol use: No   • Drug use: No   • Sexual activity: Yes     Partners: Female   Lifestyle   • Physical activity     Days per week: Not on file     Minutes per session: Not on file   • Stress: Not on file   Relationships   • Social connections     Talks on phone: Not on file     Gets together: Not on file     Attends Denominational service: Not on file     Active member of club or organization: Not on file     Attends meetings of clubs or organizations: Not on file     Relationship status: Not on file   • Intimate partner violence     Fear of current or ex partner: Not on file     Emotionally abused: Not on file     Physically abused: Not on file     Forced sexual activity: Not on file   Other Topics Concern   •  Service No   • Blood Transfusions No   • Caffeine Concern No   • Occupational Exposure No   • Hobby Hazards No   • Sleep Concern Yes   • Stress Concern Yes   • Weight Concern Yes   •  "Special Diet No   • Back Care No   • Exercise No   • Bike Helmet No   • Seat Belt Yes   • Self-Exams No   Social History Narrative   • Not on file         EXAMINATION     Physical Exam:   Vitals: /80 (BP Location: Right arm, Patient Position: Sitting, BP Cuff Size: Adult)   Pulse 85   Temp 36.9 °C (98.4 °F) (Temporal)   Ht 1.88 m (6' 2\")   Wt (!) 133.5 kg (294 lb 5 oz)   SpO2 97%     Constitutional:   Body Habitus: Body mass index is 37.79 kg/m².  Cooperation: Fully cooperates with exam  Appearance: Well-groomed no disheveled    Respiratory-  breathing comfortable on room air, no audible wheezing  Cardiovascular- capillary refills less than 2 seconds. No lower extremity edema is noted.   Psychiatric- alert and oriented ×3. Normal affect.    MSK and Neuro: -  Spurling's maneuver is positive right negative left.  Limited range of motion cervical spine.        Key points for the international standards for neurological classification of spinal cord injury (ISNCSCI) to light touch.     Dermatome R L   C4 2 2   C5 1 2   C6 1 2   C7 1 2   C8 2 2   T1 2 2   T2 2 2                                         Motor Exam Upper Extremities   ? Myotome R L   Shoulder flexion C5 5 5   Elbow flexion C5 5 5   Wrist extension C6 5 5   Elbow extension C7 5 5   Finger flexion C8 5 5   Finger abduction T1 5 5                 MEDICAL DECISION MAKING    DATA    Labs:   Lab Results   Component Value Date/Time    SODIUM 137 11/05/2020 02:23 PM    POTASSIUM 3.6 11/05/2020 02:23 PM    CHLORIDE 102 11/05/2020 02:23 PM    CO2 22 11/05/2020 02:23 PM    GLUCOSE 78 11/05/2020 02:23 PM    BUN 14 11/05/2020 02:23 PM    CREATININE 1.03 11/05/2020 02:23 PM        No results found for: PROTHROMBTM, INR     Lab Results   Component Value Date/Time    WBC 8.3 11/05/2020 02:23 PM    RBC 5.67 11/05/2020 02:23 PM    HEMOGLOBIN 15.8 11/05/2020 02:23 PM    HEMATOCRIT 48.8 11/05/2020 02:23 PM    MCV 86.1 11/05/2020 02:23 PM    MCH 27.9 11/05/2020 02:23 " PM    MCHC 32.4 (L) 2020 02:23 PM    MPV 9.9 2020 02:23 PM    NEUTSPOLYS 68.60 2020 02:23 PM    LYMPHOCYTES 19.90 (L) 2020 02:23 PM    MONOCYTES 8.90 2020 02:23 PM    EOSINOPHILS 1.30 2020 02:23 PM    BASOPHILS 0.80 2020 02:23 PM    HYPOCHROMIA 1+ 2014 10:07 AM        Lab Results   Component Value Date/Time    HBA1C 5.5 10/02/2020 09:48 AM          Imaging:     I personally reviewed following images, these are my reads  MRI cervical spine 10/8/2020  There is degenerative changes and disc bulges at C5-6 and C6-7.  No high-grade central canal stenosis is seen.  At C5-6 there is moderate right and mild left neuroforaminal stenosis.  At C6-7 there is a least moderate grade bilateral neuroforaminal stenosis.           IMAGING radiology reads. I reviewed the following radiology reads                  Results for orders placed during the hospital encounter of 10/10/17   MR-BRAIN-W/O     Impression 1.  Left anterior frontal convexity 36 mm arachnoid cyst or leptomeningeal cyst. This is consistent with a long-standing finding as there is remodeling of the adjacent inner table of the skull.  2.  Otherwise, MRI of the brain without contrast within normal limits.                          Results for orders placed in visit on 10/08/20   MR-CERVICAL SPINE-W/O                                                                                                                                                                Results for orders placed during the hospital encounter of 11   DX-SHOULDER 2+     Impression No radiographic evidence of acute traumatic injury.                        DIAGNOSIS   Visit Diagnoses     ICD-10-CM   1. Cervical radiculopathy  M54.12   2. Neuralgia  M79.2   3. Chronic pain of right upper extremity  M79.601    G89.29         ASSESSMENT and PLAN:     Chaitanya BRANHAM Leticia  1975 male      Chaitanya was seen today for follow-up.    Diagnoses and all  orders for this visit:    Cervical radiculopathy  -     REFERRAL TO PHYSICAL MEDICINE REHAB    Neuralgia    Chronic pain of right upper extremity          The patient is failed conservative treatments of medication management and physical therapy as well as a home exercise program he continues to have significant pain and functional deficits.  Is difficult to tell if his shoulder elbow and wrist pain away from his neck as his neck pain is the most severe.  I have ordered a C7-T1 interlaminar epidural steroid injection with sedation.    The risks benefits and alternatives to this procedure were discussed and the patient wishes to proceed with the procedure. Risks include but are not limited to damage to surrounding structures, infection, bleeding, worsening of pain which can be permanent, weakness which can be permanent. Benefits include pain relief, improved function. Alternatives includes not doing the procedure.      He can continue Flexeril as needed.  Stop NSAIDs prior to the procedure above.    Follow up: After the above procedure for diagnostic and therapeutic purposes    Thank you for allowing me to participate in the care of this patient. If you have any questions please not hesitate to contact me.             Please note that this dictation was created using voice recognition software. I have made every reasonable attempt to correct obvious errors but there may be errors of grammar and content that I may have overlooked prior to finalization of this note.      Roderick Best MD  Interventional Spine and Sports Physiatry  Physical Medicine and Rehabilitation  [facility]

## 2020-12-03 NOTE — H&P (VIEW-ONLY)
Follow up patient note  Interventional spine and sports physiatry, Physical medicine rehabilitation      Chief complaint:   Chief Complaint   Patient presents with   • Follow-Up     Neck pain          HISTORY    Please see new patient note by Dr Best,  for more details.     HPI  Patient identification: Chaitanya Kelly ,  1975,   With Diagnoses of Cervical radiculopathy, Neuralgia, and Chronic pain of right upper extremity were pertinent to this visit.       The patient has been going to physical therapy but he states that this has been worsening his pain with doing his physical therapy and his home exercise program.  The patient pain is severe right-sided neck pain radiating down the arm with associated numbness and tingling sensation.  He has no weakness.  This is constant.  9-10/10 in intensity.  He has a difficult time telling his shoulder pain and elbow pain away from his neck pain as this is the most severe.    I reviewed multiple notes from physical therapy including most recent note from Ty Craig PT, DPT from 2020.       ROS Red Flags :   Fever, Chills, Sweats: Denies  Involuntary Weight Loss: Denies  Bowel/Bladder Incontinence: Denies  Saddle Anesthesia: Denies        PMHx:   Past Medical History:   Diagnosis Date   • Anxiety    • ASTHMA    • Bipolar 2 disorder    • Bipolar disorder (HCC)     depression and anxiety   • Headache(784.0)    • Migraine    • Pain 10/10/2017    umbilical pain   • Seizure (HCC) 1980    related to Ritalin       PSHx:   Past Surgical History:   Procedure Laterality Date   • UMBILICAL HERNIA REPAIR N/A 10/18/2017    Procedure: UMBILICAL HERNIA REPAIR;  Surgeon: Chinedu Kamara M.D.;  Location: SURGERY [facility];  Service: General       Family history   Family History   Problem Relation Age of Onset   • Heart Disease Father 41        heart attack   • Heart Disease Paternal Aunt    • Heart Disease Paternal Uncle    • Stroke Paternal Uncle    • Heart Disease  Paternal Grandfather    • Cancer Maternal Aunt    • Lung Cancer Maternal Aunt          Medications:   Outpatient Medications Marked as Taking for the 12/3/20 encounter (Office Visit) with Roderick Best M.D.   Medication Sig Dispense Refill   • triamcinolone acetonide (KENALOG) 0.1 % Cream Apply to affected area BID x7 days 80 g 0   • amLODIPine (NORVASC) 10 MG Tab Take 1 Tab by mouth every day. 30 Tab 1   • cyclobenzaprine (FLEXERIL) 10 mg Tab Take 0.5-1 Tabs by mouth 3 times a day as needed for Mild Pain, Moderate Pain or Muscle Spasms. 90 Tab 3   • acetaminophen (TYLENOL) 500 MG Tab Take 2 Tabs by mouth 3 times a day as needed (pain.  Do not exceed 3000 mg of total acetaminophen per day). 180 Tab 6   • meclizine (ANTIVERT) 25 MG Tab Take 1 Tab by mouth 3 times a day as needed. 30 Tab 3   • buPROPion (WELLBUTRIN XL) 150 MG XL tablet Take 1 Tab by mouth 2 Times a Day. 90 Tab 0   • lamoTRIgine (LAMICTAL) 100 MG Tab TAKE 1 TABLET BY MOUTH ONCE DAILY IN THE MORNING 90 Tab 1   • lithium CR (ESKALITH CR) 450 MG Tab CR TAKE 1 TABLET BY MOUTH TWICE DAILY 180 Tab 1        Current Outpatient Medications on File Prior to Visit   Medication Sig Dispense Refill   • triamcinolone acetonide (KENALOG) 0.1 % Cream Apply to affected area BID x7 days 80 g 0   • amLODIPine (NORVASC) 10 MG Tab Take 1 Tab by mouth every day. 30 Tab 1   • cyclobenzaprine (FLEXERIL) 10 mg Tab Take 0.5-1 Tabs by mouth 3 times a day as needed for Mild Pain, Moderate Pain or Muscle Spasms. 90 Tab 3   • acetaminophen (TYLENOL) 500 MG Tab Take 2 Tabs by mouth 3 times a day as needed (pain.  Do not exceed 3000 mg of total acetaminophen per day). 180 Tab 6   • meclizine (ANTIVERT) 25 MG Tab Take 1 Tab by mouth 3 times a day as needed. 30 Tab 3   • buPROPion (WELLBUTRIN XL) 150 MG XL tablet Take 1 Tab by mouth 2 Times a Day. 90 Tab 0   • lamoTRIgine (LAMICTAL) 100 MG Tab TAKE 1 TABLET BY MOUTH ONCE DAILY IN THE MORNING 90 Tab 1   • lithium CR (ESKALITH CR) 450  MG Tab CR TAKE 1 TABLET BY MOUTH TWICE DAILY 180 Tab 1     No current facility-administered medications on file prior to visit.          Allergies:   Allergies   Allergen Reactions   • Ibuprofen Unspecified     Interaction with Lithium   • Latex Itching   • Naprosyn [Naproxen] Unspecified     Interaction with Lithium       Social Hx:   Social History     Socioeconomic History   • Marital status:      Spouse name: Not on file   • Number of children: Not on file   • Years of education: Not on file   • Highest education level: Not on file   Occupational History   • Not on file   Social Needs   • Financial resource strain: Not on file   • Food insecurity     Worry: Not on file     Inability: Not on file   • Transportation needs     Medical: Not on file     Non-medical: Not on file   Tobacco Use   • Smoking status: Former Smoker     Types: Cigarettes     Quit date: 2006     Years since quittin.9   • Smokeless tobacco: Never Used   • Tobacco comment: off and on from age [age]   Substance and Sexual Activity   • Alcohol use: No   • Drug use: No   • Sexual activity: Yes     Partners: Female   Lifestyle   • Physical activity     Days per week: Not on file     Minutes per session: Not on file   • Stress: Not on file   Relationships   • Social connections     Talks on phone: Not on file     Gets together: Not on file     Attends Je[employer]'s witness service: Not on file     Active member of club or organization: Not on file     Attends meetings of clubs or organizations: Not on file     Relationship status: Not on file   • Intimate partner violence     Fear of current or ex partner: Not on file     Emotionally abused: Not on file     Physically abused: Not on file     Forced sexual activity: Not on file   Other Topics Concern   •  Service No   • Blood Transfusions No   • Caffeine Concern No   • Occupational Exposure No   • Hobby Hazards No   • Sleep Concern Yes   • Stress Concern Yes   • Weight Concern Yes   •  "Special Diet No   • Back Care No   • Exercise No   • Bike Helmet No   • Seat Belt Yes   • Self-Exams No   Social History Narrative   • Not on file         EXAMINATION     Physical Exam:   Vitals: /80 (BP Location: Right arm, Patient Position: Sitting, BP Cuff Size: Adult)   Pulse 85   Temp 36.9 °C (98.4 °F) (Temporal)   Ht 1.88 m (6' 2\")   Wt (!) 133.5 kg (294 lb 5 oz)   SpO2 97%     Constitutional:   Body Habitus: Body mass index is 37.79 kg/m².  Cooperation: Fully cooperates with exam  Appearance: Well-groomed no disheveled    Respiratory-  breathing comfortable on room air, no audible wheezing  Cardiovascular- capillary refills less than 2 seconds. No lower extremity edema is noted.   Psychiatric- alert and oriented ×3. Normal affect.    MSK and Neuro: -  Spurling's maneuver is positive right negative left.  Limited range of motion cervical spine.        Key points for the international standards for neurological classification of spinal cord injury (ISNCSCI) to light touch.     Dermatome R L   C4 2 2   C5 1 2   C6 1 2   C7 1 2   C8 2 2   T1 2 2   T2 2 2                                         Motor Exam Upper Extremities   ? Myotome R L   Shoulder flexion C5 5 5   Elbow flexion C5 5 5   Wrist extension C6 5 5   Elbow extension C7 5 5   Finger flexion C8 5 5   Finger abduction T1 5 5                 MEDICAL DECISION MAKING    DATA    Labs:   Lab Results   Component Value Date/Time    SODIUM 137 11/05/2020 02:23 PM    POTASSIUM 3.6 11/05/2020 02:23 PM    CHLORIDE 102 11/05/2020 02:23 PM    CO2 22 11/05/2020 02:23 PM    GLUCOSE 78 11/05/2020 02:23 PM    BUN 14 11/05/2020 02:23 PM    CREATININE 1.03 11/05/2020 02:23 PM        No results found for: PROTHROMBTM, INR     Lab Results   Component Value Date/Time    WBC 8.3 11/05/2020 02:23 PM    RBC 5.67 11/05/2020 02:23 PM    HEMOGLOBIN 15.8 11/05/2020 02:23 PM    HEMATOCRIT 48.8 11/05/2020 02:23 PM    MCV 86.1 11/05/2020 02:23 PM    MCH 27.9 11/05/2020 02:23 " PM    MCHC 32.4 (L) 2020 02:23 PM    MPV 9.9 2020 02:23 PM    NEUTSPOLYS 68.60 2020 02:23 PM    LYMPHOCYTES 19.90 (L) 2020 02:23 PM    MONOCYTES 8.90 2020 02:23 PM    EOSINOPHILS 1.30 2020 02:23 PM    BASOPHILS 0.80 2020 02:23 PM    HYPOCHROMIA 1+ 2014 10:07 AM        Lab Results   Component Value Date/Time    HBA1C 5.5 10/02/2020 09:48 AM          Imaging:     I personally reviewed following images, these are my reads  MRI cervical spine 10/8/2020  There is degenerative changes and disc bulges at C5-6 and C6-7.  No high-grade central canal stenosis is seen.  At C5-6 there is moderate right and mild left neuroforaminal stenosis.  At C6-7 there is a least moderate grade bilateral neuroforaminal stenosis.           IMAGING radiology reads. I reviewed the following radiology reads                  Results for orders placed during the hospital encounter of 10/10/17   MR-BRAIN-W/O     Impression 1.  Left anterior frontal convexity 36 mm arachnoid cyst or leptomeningeal cyst. This is consistent with a long-standing finding as there is remodeling of the adjacent inner table of the skull.  2.  Otherwise, MRI of the brain without contrast within normal limits.                          Results for orders placed in visit on 10/08/20   MR-CERVICAL SPINE-W/O                                                                                                                                                                Results for orders placed during the hospital encounter of 11   DX-SHOULDER 2+     Impression No radiographic evidence of acute traumatic injury.                        DIAGNOSIS   Visit Diagnoses     ICD-10-CM   1. Cervical radiculopathy  M54.12   2. Neuralgia  M79.2   3. Chronic pain of right upper extremity  M79.601    G89.29         ASSESSMENT and PLAN:     Chaitanya BRANHAM Leticia  1975 male      Chaitanya was seen today for follow-up.    Diagnoses and all  orders for this visit:    Cervical radiculopathy  -     REFERRAL TO PHYSICAL MEDICINE REHAB    Neuralgia    Chronic pain of right upper extremity          The patient is failed conservative treatments of medication management and physical therapy as well as a home exercise program he continues to have significant pain and functional deficits.  Is difficult to tell if his shoulder elbow and wrist pain away from his neck as his neck pain is the most severe.  I have ordered a C7-T1 interlaminar epidural steroid injection with sedation.    The risks benefits and alternatives to this procedure were discussed and the patient wishes to proceed with the procedure. Risks include but are not limited to damage to surrounding structures, infection, bleeding, worsening of pain which can be permanent, weakness which can be permanent. Benefits include pain relief, improved function. Alternatives includes not doing the procedure.      He can continue Flexeril as needed.  Stop NSAIDs prior to the procedure above.    Follow up: After the above procedure for diagnostic and therapeutic purposes    Thank you for allowing me to participate in the care of this patient. If you have any questions please not hesitate to contact me.             Please note that this dictation was created using voice recognition software. I have made every reasonable attempt to correct obvious errors but there may be errors of grammar and content that I may have overlooked prior to finalization of this note.      Roderick Best MD  Interventional Spine and Sports Physiatry  Physical Medicine and Rehabilitation  [facility]

## 2020-12-03 NOTE — ASSESSMENT & PLAN NOTE
Follow up; now worsening.   Pt to have good and bad days, though dizziness is worsening and is associated with migraine as well.     Was seen in UC initially 08/30 for Vertigo.     Mild headache yesterday which turned into Migraine today.   Does have seasonal allergies and environmental allergies.   No fevers/chiils.   Has had some nausea and vomiting related to vertigo.      Did have COVID 07/2020.   
Mild sensitivity to light and sound.   No nausea/vomiting  Worsening dizziness.     Unsure if related, though is have worsening neck pain with right sided radiculopathy.   Has been using muscle relaxer since he saw Dr. PACHECO.     Scheduled to have Brain MRI and C Spine MRI on 10/08/2020.   Labs for 10/02/2020.     
Was seen by Dr. Chin for cervicalgia and radiculopathy.   Pt reports that he is having numbness and tingling down right arm  Less when he flexes his neck forward and down toward chest.     Using Flexeril with some relief.   
yes

## 2020-12-08 ENCOUNTER — APPOINTMENT (OUTPATIENT)
Dept: PHYSICAL THERAPY | Facility: REHABILITATION | Age: 45
End: 2020-12-08
Attending: PHYSICAL MEDICINE & REHABILITATION
Payer: COMMERCIAL

## 2020-12-10 ENCOUNTER — APPOINTMENT (OUTPATIENT)
Dept: PHYSICAL THERAPY | Facility: REHABILITATION | Age: 45
End: 2020-12-10
Attending: PHYSICAL MEDICINE & REHABILITATION
Payer: COMMERCIAL

## 2020-12-11 ENCOUNTER — APPOINTMENT (OUTPATIENT)
Dept: RADIOLOGY | Facility: REHABILITATION | Age: 45
End: 2020-12-11
Attending: PHYSICAL MEDICINE & REHABILITATION
Payer: COMMERCIAL

## 2020-12-11 ENCOUNTER — HOSPITAL ENCOUNTER (OUTPATIENT)
Facility: REHABILITATION | Age: 45
End: 2020-12-11
Attending: PHYSICAL MEDICINE & REHABILITATION | Admitting: PHYSICAL MEDICINE & REHABILITATION
Payer: COMMERCIAL

## 2020-12-11 VITALS
HEIGHT: 74 IN | WEIGHT: 289.68 LBS | BODY MASS INDEX: 37.18 KG/M2 | HEART RATE: 79 BPM | TEMPERATURE: 98.1 F | RESPIRATION RATE: 16 BRPM | DIASTOLIC BLOOD PRESSURE: 96 MMHG | OXYGEN SATURATION: 97 % | SYSTOLIC BLOOD PRESSURE: 164 MMHG

## 2020-12-11 PROCEDURE — 700117 HCHG RX CONTRAST REV CODE 255

## 2020-12-11 PROCEDURE — 62321 NJX INTERLAMINAR CRV/THRC: CPT

## 2020-12-11 PROCEDURE — 700111 HCHG RX REV CODE 636 W/ 250 OVERRIDE (IP)

## 2020-12-11 RX ORDER — DEXAMETHASONE SODIUM PHOSPHATE 10 MG/ML
INJECTION, SOLUTION INTRAMUSCULAR; INTRAVENOUS
Status: COMPLETED
Start: 2020-12-11 | End: 2020-12-11

## 2020-12-11 RX ORDER — MIDAZOLAM HYDROCHLORIDE 1 MG/ML
INJECTION INTRAMUSCULAR; INTRAVENOUS
Status: COMPLETED
Start: 2020-12-11 | End: 2020-12-11

## 2020-12-11 RX ORDER — ONDANSETRON 2 MG/ML
4 INJECTION INTRAMUSCULAR; INTRAVENOUS
Status: DISCONTINUED | OUTPATIENT
Start: 2020-12-11 | End: 2020-12-11 | Stop reason: HOSPADM

## 2020-12-11 RX ORDER — LIDOCAINE HYDROCHLORIDE 10 MG/ML
INJECTION, SOLUTION EPIDURAL; INFILTRATION; INTRACAUDAL; PERINEURAL
Status: COMPLETED
Start: 2020-12-11 | End: 2020-12-11

## 2020-12-11 RX ADMIN — LIDOCAINE HYDROCHLORIDE 10 ML: 10 INJECTION, SOLUTION EPIDURAL; INFILTRATION; INTRACAUDAL; PERINEURAL at 11:01

## 2020-12-11 RX ADMIN — IOHEXOL 3 ML: 240 INJECTION, SOLUTION INTRATHECAL; INTRAVASCULAR; INTRAVENOUS; ORAL at 11:05

## 2020-12-11 RX ADMIN — DEXAMETHASONE SODIUM PHOSPHATE 10 MG: 10 INJECTION, SOLUTION INTRAMUSCULAR; INTRAVENOUS at 11:05

## 2020-12-11 ASSESSMENT — PAIN DESCRIPTION - PAIN TYPE: TYPE: CHRONIC PAIN

## 2020-12-11 ASSESSMENT — FIBROSIS 4 INDEX: FIB4 SCORE: 0.33

## 2020-12-11 NOTE — INTERVAL H&P NOTE
H&P reviewed. The patient was examined and there are no changes to the H&P     Lungs clear to auscultation bilaterally.  No abdominal tenderness.  Heart regular rate and rhythm.  Vitals reviewed.    Proceed with the originally scheduled procedure.  The risks, benefits and alternatives were discussed.  The patient understands these.    Roderick Best MD  Physical Medicine and Rehabilitation  Interventional Spine and Sports Physiatry  [facility]

## 2020-12-11 NOTE — OP REPORT
Date of Service: 12/11/2020    Physician/s: Roderick Best MD    Pre-operative Diagnosis: Cervical radiculopathy    Post-operative Diagnosis: Cervical radiculopathy    Procedure: C7-T1  Cervical interlaminar epidural steroid injection    Description of procedure:    The risks, benefits, and alternatives of the procedure were reviewed and discussed with the patient.  Written informed consent was freely obtained. A pre-procedural time-out was conducted by the physician verifying patient’s identity, procedure to be performed, procedure site and side, and allergy verification. Appropriate equipment was determined to be in place for the procedure.       The patient's vital signs were carefully monitored before, throughout, and after the procedure.     In the fluoroscopy suite the patient was placed in a prone position, a pillow placed underneath their chest. The skin was prepped and draped in the usual sterile fashion. The fluoroscope was placed over the cervical neck at the appropriate injection AP angle view, and the target for injection was marked. A 25g needle was placed into the marked site, and approx 2cc of 1% Lidocaine was injected subcutaneously into the epidermal and dermal layers. The needle was removed. A 20g Tuohy needle was then placed and advanced under fluoroscopic guidance into the RIGHT C7-T1 interlaminar space at both the initial position AP view and contralateral oblique at a lateral view to ensure proper location of the needle tip at all times.  The needle was advanced with fluoroscopic guidance to the superior aspect of the T1 lamina.  Then a contralateral oblique view was used to advance the needle slightly towards the epidural space, A loss-of-resistance technique was used to guide the needle into the epidural space in a lateral fluoroscopic view and confirmed with loss of resistance with sterile normal saline. In the AP and lateral views, contrast dye was used.  to highlight the epidural space  spread while the fluoroscope was running live. Following negative aspiration, 1mL of 10mg/mL of dexamethasone followed by 2 mL of sterile saline . The needle was removed intact after restyleted. The patient's back was covered with a 4x4 gauze, the area was cleansed with sterile normal saline, and a dressing was applied. There were no complications noted.     The patient was then evaluated post-procedure, and was hemodynamically stable prior to leaving the post-operative care unit.     Follow-up as scheduled    Roderick Best MD  Physical Medicine and Rehabilitation  Interventional Spine and Sports Physiatry  [facility]        CPT  interlaminar cervical/thoracic epidural: [id number]

## 2020-12-11 NOTE — NON-PROVIDER
Patient name verified. Procedure and site confirmed, informed consent signed.Has updated H&P. Medication allergies and current medications reviewed in epic. Has  waiting . Printed home care discharged instruction  TOBI sheet information as well as education regarding pre- post infection prevention  verbal given and understood by him. Pertinent medical health information  reviewed with in epic ( HTN, TOBI on CPAP,Asthma, bipolar disorder ) .Denied taking any anti- inflammatories  and anti-coagulation medication.

## 2020-12-11 NOTE — NON-PROVIDER
Fluids  and food tolerated well. Ice compress applied to the affected area. Dr. Best evaluated patient.  Reviewed  home care and  TOBI  protocol  instructions given and  he verbalized understanding. Discharged ambulatory without difficulty with designated .

## 2020-12-11 NOTE — PROGRESS NOTES
Handoff received from pre-procedure RN. Pre assessment complete with pt input, time out completed, including 2 pt identifiers, procedure and site of procedure, allergies to latex and nsaids, pt history including HTN, celiac dz, vertigo, migraine, asthma, TOBI. Pt positioned prone by CST, RN, XRT, ankles resting on pillow, hands tucked. Pt has decided to not have sedation with this procedure.

## 2020-12-14 ENCOUNTER — TELEPHONE (OUTPATIENT)
Dept: PHYSICAL MEDICINE AND REHAB | Facility: MEDICAL CENTER | Age: 45
End: 2020-12-14

## 2020-12-14 NOTE — TELEPHONE ENCOUNTER
LM to call us back in regards to his SP that was done with Dr. Best dated 12/11/2020 for his C7-T1 interlaminar epidural steroid injection with sedation.

## 2020-12-15 ENCOUNTER — APPOINTMENT (OUTPATIENT)
Dept: PHYSICAL THERAPY | Facility: REHABILITATION | Age: 45
End: 2020-12-15
Attending: PHYSICAL MEDICINE & REHABILITATION
Payer: COMMERCIAL

## 2020-12-17 ENCOUNTER — APPOINTMENT (OUTPATIENT)
Dept: PHYSICAL THERAPY | Facility: REHABILITATION | Age: 45
End: 2020-12-17
Attending: PHYSICAL MEDICINE & REHABILITATION
Payer: COMMERCIAL

## 2020-12-22 ENCOUNTER — APPOINTMENT (OUTPATIENT)
Dept: PHYSICAL THERAPY | Facility: REHABILITATION | Age: 45
End: 2020-12-22
Attending: PHYSICAL MEDICINE & REHABILITATION
Payer: COMMERCIAL

## 2020-12-22 ENCOUNTER — OFFICE VISIT (OUTPATIENT)
Dept: MEDICAL GROUP | Facility: LAB | Age: 45
End: 2020-12-22
Payer: COMMERCIAL

## 2020-12-22 VITALS
WEIGHT: 295 LBS | OXYGEN SATURATION: 97 % | SYSTOLIC BLOOD PRESSURE: 135 MMHG | TEMPERATURE: 99.5 F | HEART RATE: 89 BPM | DIASTOLIC BLOOD PRESSURE: 80 MMHG | BODY MASS INDEX: 37.86 KG/M2 | HEIGHT: 74 IN | RESPIRATION RATE: 20 BRPM

## 2020-12-22 DIAGNOSIS — I10 ESSENTIAL HYPERTENSION: ICD-10-CM

## 2020-12-22 PROCEDURE — 99213 OFFICE O/P EST LOW 20 MIN: CPT | Performed by: PHYSICIAN ASSISTANT

## 2020-12-22 ASSESSMENT — FIBROSIS 4 INDEX: FIB4 SCORE: 0.33

## 2020-12-22 NOTE — PATIENT INSTRUCTIONS
"DASH Eating Plan  DASH stands for \"Dietary Approaches to Stop Hypertension.\" The DASH eating plan is a healthy eating plan that has been shown to reduce high blood pressure (hypertension). It may also reduce your risk for type 2 diabetes, heart disease, and stroke. The DASH eating plan may also help with weight loss.  What are tips for following this plan?    General guidelines  · Avoid eating more than 2,300 mg (milligrams) of salt (sodium) a day. If you have hypertension, you may need to reduce your sodium intake to 1,500 mg a day.  · Limit alcohol intake to no more than 1 drink a day for nonpregnant women and 2 drinks a day for men. One drink equals 12 oz of beer, 5 oz of wine, or 1½ oz of hard liquor.  · Work with your health care provider to maintain a healthy body weight or to lose weight. Ask what an ideal weight is for you.  · Get at least 30 minutes of exercise that causes your heart to beat faster (aerobic exercise) most days of the week. Activities may include walking, swimming, or biking.  · Work with your health care provider or diet and nutrition specialist (dietitian) to adjust your eating plan to your individual calorie needs.  Reading food labels    · Check food labels for the amount of sodium per serving. Choose foods with less than 5 percent of the Daily Value of sodium. Generally, foods with less than 300 mg of sodium per serving fit into this eating plan.  · To find whole grains, look for the word \"whole\" as the first word in the ingredient list.  Shopping  · Buy products labeled as \"low-sodium\" or \"no salt added.\"  · Buy fresh foods. Avoid canned foods and premade or frozen meals.  Cooking  · Avoid adding salt when cooking. Use salt-free seasonings or herbs instead of table salt or sea salt. Check with your health care provider or pharmacist before using salt substitutes.  · Do not donovan foods. Cook foods using healthy methods such as baking, boiling, grilling, and broiling instead.  · Cook with " heart-healthy oils, such as olive, canola, soybean, or sunflower oil.  Meal planning  · Eat a balanced diet that includes:  ? 5 or more servings of fruits and vegetables each day. At each meal, try to fill half of your plate with fruits and vegetables.  ? Up to 6-8 servings of whole grains each day.  ? Less than 6 oz of lean meat, poultry, or fish each day. A 3-oz serving of meat is about the same size as a deck of cards. One egg equals 1 oz.  ? 2 servings of low-fat dairy each day.  ? A serving of nuts, seeds, or beans 5 times each week.  ? Heart-healthy fats. Healthy fats called Omega-3 fatty acids are found in foods such as flaxseeds and coldwater fish, like sardines, salmon, and mackerel.  · Limit how much you eat of the following:  ? Canned or prepackaged foods.  ? Food that is high in trans fat, such as fried foods.  ? Food that is high in saturated fat, such as fatty meat.  ? Sweets, desserts, sugary drinks, and other foods with added sugar.  ? Full-fat dairy products.  · Do not salt foods before eating.  · Try to eat at least 2 vegetarian meals each week.  · Eat more home-cooked food and less restaurant, buffet, and fast food.  · When eating at a restaurant, ask that your food be prepared with less salt or no salt, if possible.  What foods are recommended?  The items listed may not be a complete list. Talk with your dietitian about what dietary choices are best for you.  Grains  Whole-grain or whole-wheat bread. Whole-grain or whole-wheat pasta. Brown rice. Oatmeal. Quinoa. Bulgur. Whole-grain and low-sodium cereals. Bere bread. Low-fat, low-sodium crackers. Whole-wheat flour tortillas.  Vegetables  Fresh or frozen vegetables (raw, steamed, roasted, or grilled). Low-sodium or reduced-sodium tomato and vegetable juice. Low-sodium or reduced-sodium tomato sauce and tomato paste. Low-sodium or reduced-sodium canned vegetables.  Fruits  All fresh, dried, or frozen fruit. Canned fruit in natural juice (without  added sugar).  Meat and other protein foods  Skinless chicken or turkey. Ground chicken or turkey. Pork with fat trimmed off. Fish and seafood. Egg whites. Dried beans, peas, or lentils. Unsalted nuts, nut butters, and seeds. Unsalted canned beans. Lean cuts of beef with fat trimmed off. Low-sodium, lean deli meat.  Dairy  Low-fat (1%) or fat-free (skim) milk. Fat-free, low-fat, or reduced-fat cheeses. Nonfat, low-sodium ricotta or cottage cheese. Low-fat or nonfat yogurt. Low-fat, low-sodium cheese.  Fats and oils  Soft margarine without trans fats. Vegetable oil. Low-fat, reduced-fat, or light mayonnaise and salad dressings (reduced-sodium). Canola, safflower, olive, soybean, and sunflower oils. Avocado.  Seasoning and other foods  Herbs. Spices. Seasoning mixes without salt. Unsalted popcorn and pretzels. Fat-free sweets.  What foods are not recommended?  The items listed may not be a complete list. Talk with your dietitian about what dietary choices are best for you.  Grains  Baked goods made with fat, such as croissants, muffins, or some breads. Dry pasta or rice meal packs.  Vegetables  Creamed or fried vegetables. Vegetables in a cheese sauce. Regular canned vegetables (not low-sodium or reduced-sodium). Regular canned tomato sauce and paste (not low-sodium or reduced-sodium). Regular tomato and vegetable juice (not low-sodium or reduced-sodium). Pickles. Olives.  Fruits  Canned fruit in a light or heavy syrup. Fried fruit. Fruit in cream or butter sauce.  Meat and other protein foods  Fatty cuts of meat. Ribs. Fried meat. Chang. Sausage. Bologna and other processed lunch meats. Salami. Fatback. Hotdogs. Bratwurst. Salted nuts and seeds. Canned beans with added salt. Canned or smoked fish. Whole eggs or egg yolks. Chicken or turkey with skin.  Dairy  Whole or 2% milk, cream, and half-and-half. Whole or full-fat cream cheese. Whole-fat or sweetened yogurt. Full-fat cheese. Nondairy creamers. Whipped toppings.  Processed cheese and cheese spreads.  Fats and oils  Butter. Stick margarine. Lard. Shortening. Ghee. Chang fat. Tropical oils, such as coconut, palm kernel, or palm oil.  Seasoning and other foods  Salted popcorn and pretzels. Onion salt, garlic salt, seasoned salt, table salt, and sea salt. Worcestershire sauce. Tartar sauce. Barbecue sauce. Teriyaki sauce. Soy sauce, including reduced-sodium. Steak sauce. Canned and packaged gravies. Fish sauce. Oyster sauce. Cocktail sauce. Horseradish that you find on the shelf. Ketchup. Mustard. Meat flavorings and tenderizers. Bouillon cubes. Hot sauce and Tabasco sauce. Premade or packaged marinades. Premade or packaged taco seasonings. Relishes. Regular salad dressings.  Where to find more information:  · National Heart, Lung, and Blood [place]: www.nhlbi.nih.gov  · American Heart Association: www.heart.org  Summary  · The DASH eating plan is a healthy eating plan that has been shown to reduce high blood pressure (hypertension). It may also reduce your risk for type 2 diabetes, heart disease, and stroke.  · With the DASH eating plan, you should limit salt (sodium) intake to 2,300 mg a day. If you have hypertension, you may need to reduce your sodium intake to 1,500 mg a day.  · When on the DASH eating plan, aim to eat more fresh fruits and vegetables, whole grains, lean proteins, low-fat dairy, and heart-healthy fats.  · Work with your health care provider or diet and nutrition specialist (dietitian) to adjust your eating plan to your individual calorie needs.  This information is not intended to replace advice given to you by your health care provider. Make sure you discuss any questions you have with your health care provider.  Document Released: 12/06/2012 Document Revised: 11/30/2018 Document Reviewed: 12/11/2017  Elsevier Patient Education © 2020 [employer].

## 2020-12-22 NOTE — ASSESSMENT & PLAN NOTE
Pt presents today for BP follow up.   BP at home has been consistently 142/mid 80s.; 143/84.  Current regimen include Amlodipine 10mg PO once daily.     BP was elevated recently prior to having spinal injection with pain management and it was recommended that he follow up with PCP.     Denies chest pain or sob  Ongoing chronic headaches.

## 2020-12-22 NOTE — PROGRESS NOTES
"Subjective:   Chaitanya Kelly is a 45 y.o. male here today for BP check     Essential hypertension  Pt presents today for BP follow up.   BP at home has been consistently 142/mid 80s.; 143/84.  Current regimen include Amlodipine 10mg PO once daily.     BP was elevated recently prior to having spinal injection with pain management and it was recommended that he follow up with PCP.     Denies chest pain or sob  Ongoing chronic headaches.             Current medicines (including changes today)  Current Outpatient Medications   Medication Sig Dispense Refill   • triamcinolone acetonide (KENALOG) 0.1 % Cream Apply to affected area BID x7 days 80 g 0   • amLODIPine (NORVASC) 10 MG Tab Take 1 Tab by mouth every day. 30 Tab 1   • cyclobenzaprine (FLEXERIL) 10 mg Tab Take 0.5-1 Tabs by mouth 3 times a day as needed for Mild Pain, Moderate Pain or Muscle Spasms. 90 Tab 3   • acetaminophen (TYLENOL) 500 MG Tab Take 2 Tabs by mouth 3 times a day as needed (pain.  Do not exceed 3000 mg of total acetaminophen per day). 180 Tab 6   • meclizine (ANTIVERT) 25 MG Tab Take 1 Tab by mouth 3 times a day as needed. 30 Tab 3   • buPROPion (WELLBUTRIN XL) 150 MG XL tablet Take 1 Tab by mouth 2 Times a Day. 90 Tab 0   • lamoTRIgine (LAMICTAL) 100 MG Tab TAKE 1 TABLET BY MOUTH ONCE DAILY IN THE MORNING 90 Tab 1   • lithium CR (ESKALITH CR) 450 MG Tab CR TAKE 1 TABLET BY MOUTH TWICE DAILY 180 Tab 1     No current facility-administered medications for this visit.      He  has a past medical history of Anxiety, ASTHMA, Bipolar 2 disorder, Bipolar disorder ([facility]), Headache(784.0), Migraine, Pain (10/10/2017), and Seizure ([facility]) (1980).    ROS   No chest pain, no shortness of breath, no abdominal pain       Objective:     /80 (BP Cuff Size: Adult)   Pulse 89   Temp 37.5 °C (99.5 °F) (Temporal)   Resp 20   Ht 1.88 m (6' 2\")   Wt (!) 133.8 kg (295 lb)   SpO2 97%  Body mass index is 37.88 kg/m².  Physical Exam:  Constitutional: Alert, " no distress.  Skin: Warm, dry, good turgor, no rashes in visible areas.  Eye: Equal, round and reactive, conjunctiva clear, lids normal.  Neck: Trachea midline  Respiratory: Unlabored respiratory effort  Psych: Alert and oriented x3, normal affect and mood.    Assessment and Plan:   The following treatment plan was discussed    1. Essential hypertension  Follow up; BP is well controlled today in clinic, we checked both right and left arms and was WNL.   Only using Amlodipine 10mg for BP control.  Our measurements were lower than his home BP monitor.   Will have him follow up in 3-4 weeks for MA visit for BP check.   Continue to monitor   I have printed out the DASH diet information for him to continue to monitor his diet and reduce salt/sodium.     Followup: Return in about 4 weeks (around 1/19/2021) for MA BP check .       KAYLEY LightA.-ROSEY.  Supervising MD: Dr. Holley Norton MD  12/22/20

## 2020-12-28 ENCOUNTER — APPOINTMENT (OUTPATIENT)
Dept: PHYSICAL THERAPY | Facility: REHABILITATION | Age: 45
End: 2020-12-28
Attending: PHYSICAL MEDICINE & REHABILITATION
Payer: COMMERCIAL

## 2020-12-30 ENCOUNTER — APPOINTMENT (OUTPATIENT)
Dept: PHYSICAL THERAPY | Facility: REHABILITATION | Age: 45
End: 2020-12-30
Attending: PHYSICAL MEDICINE & REHABILITATION
Payer: COMMERCIAL

## 2021-01-04 ENCOUNTER — APPOINTMENT (OUTPATIENT)
Dept: PHYSICAL THERAPY | Facility: REHABILITATION | Age: 46
End: 2021-01-04
Attending: PHYSICAL MEDICINE & REHABILITATION
Payer: COMMERCIAL

## 2021-01-04 DIAGNOSIS — R60.9 EDEMA, UNSPECIFIED TYPE: ICD-10-CM

## 2021-01-04 RX ORDER — HYDROCHLOROTHIAZIDE 12.5 MG/1
12.5 TABLET ORAL DAILY
Qty: 30 TAB | Refills: 0 | Status: SHIPPED | OUTPATIENT
Start: 2021-01-04 | End: 2021-01-11

## 2021-01-04 NOTE — PROGRESS NOTES
1. Edema, unspecified type  hydroCHLOROthiazide (HYDRODIURIL) 12.5 MG tablet     Cesilia Dotson P.A.-C.

## 2021-01-06 ENCOUNTER — APPOINTMENT (OUTPATIENT)
Dept: PHYSICAL THERAPY | Facility: REHABILITATION | Age: 46
End: 2021-01-06
Attending: PHYSICAL MEDICINE & REHABILITATION
Payer: COMMERCIAL

## 2021-01-11 ENCOUNTER — TELEMEDICINE (OUTPATIENT)
Dept: MEDICAL GROUP | Facility: LAB | Age: 46
End: 2021-01-11
Payer: COMMERCIAL

## 2021-01-11 ENCOUNTER — APPOINTMENT (OUTPATIENT)
Dept: PHYSICAL THERAPY | Facility: REHABILITATION | Age: 46
End: 2021-01-11
Attending: PHYSICAL MEDICINE & REHABILITATION
Payer: COMMERCIAL

## 2021-01-11 VITALS — BODY MASS INDEX: 37.86 KG/M2 | HEIGHT: 74 IN | WEIGHT: 295 LBS

## 2021-01-11 DIAGNOSIS — I10 ESSENTIAL HYPERTENSION: ICD-10-CM

## 2021-01-11 PROCEDURE — 99213 OFFICE O/P EST LOW 20 MIN: CPT | Mod: 95,CR | Performed by: PHYSICIAN ASSISTANT

## 2021-01-11 ASSESSMENT — FIBROSIS 4 INDEX: FIB4 SCORE: 0.33

## 2021-01-11 NOTE — ASSESSMENT & PLAN NOTE
Follow up - pt is currently using Amlodipine 10mg, however, he is have significant b/l leg swelling.   BP continues to be poorly controlled.   HCTZ was started at last visit, but was rejected at the pharmacy due to possible side effects of increasing Lamictal.   This is the problem we have had with ACE/ARB medications as well.

## 2021-01-11 NOTE — PROGRESS NOTES
This evaluation was conducted via Zoom using secure and encrypted videoconferencing technology. The patient was in a private location in the state of [place].    The patient's identity was confirmed and verbal consent was obtained for this virtual visit.    Subjective:     CC: BP follow up   Chaitanya Kelly is a 45 y.o. male presenting to discuss the evaluation and management of BP follow up     Essential hypertension  Follow up - pt is currently using Amlodipine 10mg, however, he is have significant b/l leg swelling.   BP continues to be poorly controlled.   HCTZ was started at last visit, but was rejected at the pharmacy due to possible side effects of increasing Lamictal.   This is the problem we have had with ACE/ARB medications as well.     ROS  See HPI  Constitutional: Negative for fever, chills and malaise/fatigue.   Respiratory: Negative for cough and shortness of breath.    Cardiovascular: Negative for leg swelling.   Skin: Negative for rash.   Psychiatric/Behavioral: Negative for depression.  The patient is not nervous/anxious.      Allergies   Allergen Reactions   • Ibuprofen Unspecified     Interaction with Lithium   • Latex Itching   • Naprosyn [Naproxen] Unspecified     Interaction with Lithium       Current medicines (including changes today)  Current Outpatient Medications   Medication Sig Dispense Refill   • triamcinolone acetonide (KENALOG) 0.1 % Cream Apply to affected area BID x7 days 80 g 0   • amLODIPine (NORVASC) 10 MG Tab Take 1 Tab by mouth every day. 30 Tab 1   • cyclobenzaprine (FLEXERIL) 10 mg Tab Take 0.5-1 Tabs by mouth 3 times a day as needed for Mild Pain, Moderate Pain or Muscle Spasms. 90 Tab 3   • acetaminophen (TYLENOL) 500 MG Tab Take 2 Tabs by mouth 3 times a day as needed (pain.  Do not exceed 3000 mg of total acetaminophen per day). 180 Tab 6   • meclizine (ANTIVERT) 25 MG Tab Take 1 Tab by mouth 3 times a day as needed. 30 Tab 3   • buPROPion (WELLBUTRIN XL) 150 MG XL  "tablet Take 1 Tab by mouth 2 Times a Day. 90 Tab 0   • lamoTRIgine (LAMICTAL) 100 MG Tab TAKE 1 TABLET BY MOUTH ONCE DAILY IN THE MORNING 90 Tab 1   • lithium CR (ESKALITH CR) 450 MG Tab CR TAKE 1 TABLET BY MOUTH TWICE DAILY 180 Tab 1     No current facility-administered medications for this visit.        He  has a past medical history of Anxiety, ASTHMA, Bipolar 2 disorder, Bipolar disorder ([facility]), Headache(784.0), Migraine, Pain (10/10/2017), and Seizure ([facility]) ().  He  has a past surgical history that includes umbilical hernia repair (N/A, 10/18/2017) and block epidural steroid injection (N/A, 2020).      Family History   Problem Relation Age of Onset   • Heart Disease Father 41        heart attack   • Heart Disease Paternal Aunt    • Heart Disease Paternal Uncle    • Stroke Paternal Uncle    • Heart Disease Paternal Grandfather    • Cancer Maternal Aunt    • Lung Cancer Maternal Aunt      Family Status   Relation Name Status   • Mo  Alive   • Fa     • PAunt  (Not Specified)   • PUnc  (Not Specified)   • PGFa  (Not Specified)   • MAunt  Alive   • MAunt         Patient Active Problem List    Diagnosis Date Noted   • Skin change 2020   • Ingrown toenail of both feet 10/28/2020   • Vitamin D deficiency 10/03/2020   • Celiac disease 2020   • Vertigo 2020   • Essential hypertension 2020   • Umbilical hernia without obstruction or gangrene 10/18/2017   • Abdominal wall bulge 2017   • Mild intermittent asthma without complication 2016   • Migraine without aura and without status migrainosus, not intractable 2016   • Chronic fatigue 2016   • Bipolar 1 disorder, depressed, moderate ([facility]) 10/13/2016     Class: Chronic   • Neck pain 2016   • Obstructive sleep apnea 04/15/2016   • Dyslipidemia 2014   • Obesity (BMI 30-39.9) 2014   • Family history of early CAD 2014          Objective:   Ht 1.88 m (6' 2\")   Wt (!) 133.8 kg (295 " lb)   BMI 37.88 kg/m²     Physical Exam:  Constitutional: Alert, no distress, well-groomed.  Skin: No rashes in visible areas.  Eye: Round. Conjunctiva clear, lids normal. No icterus.   ENMT: Lips pink without lesions, good dentition, moist mucous membranes. Phonation normal.  Neck: No masses, no thyromegaly. Moves freely without pain.  Respiratory: Unlabored respiratory effort, no cough or audible wheeze  Psych: Alert and oriented x3, normal affect and mood.       Assessment and Plan:   The following treatment plan was discussed:     1. Essential hypertension  Due to swelling, recommend that he cut the Amlodipine to half and just take 5mg for now.   Pt to come in for BP check tomorrow as nurse visit.   Will await appointment by vascular medicine as there is a risk of increasing the Lamictal with ACE/ARB and diuretics.   - REFERRAL TO VASCULAR MEDICINE      Follow-up: Return in about 3 months (around 4/11/2021), or if symptoms worsen or fail to improve.    KAYLEY LightAEDUARDO.  Supervising MD: Dr. Holley Norton MD  01/11/21

## 2021-01-12 ENCOUNTER — NON-PROVIDER VISIT (OUTPATIENT)
Dept: MEDICAL GROUP | Facility: LAB | Age: 46
End: 2021-01-12
Payer: COMMERCIAL

## 2021-01-12 VITALS — DIASTOLIC BLOOD PRESSURE: 63 MMHG | SYSTOLIC BLOOD PRESSURE: 136 MMHG

## 2021-01-12 NOTE — PROGRESS NOTES
Chaitanya Kelly is a 45 y.o. male here for a non-provider visit for BP Check    There were no vitals filed for this visit.  If abnormal, was an in office provider notified today? No  Routed to PCP? Yes

## 2021-01-13 ENCOUNTER — APPOINTMENT (OUTPATIENT)
Dept: PHYSICAL THERAPY | Facility: REHABILITATION | Age: 46
End: 2021-01-13
Attending: PHYSICAL MEDICINE & REHABILITATION
Payer: COMMERCIAL

## 2021-01-15 ENCOUNTER — TELEPHONE (OUTPATIENT)
Dept: VASCULAR LAB | Facility: MEDICAL CENTER | Age: 46
End: 2021-01-15

## 2021-01-15 NOTE — TELEPHONE ENCOUNTER
CLARKM for pt to call back to schedule initial vascular medicine visit w/ Dr. Bloch when available.

## 2021-01-18 ENCOUNTER — PHYSICAL THERAPY (OUTPATIENT)
Dept: PHYSICAL THERAPY | Facility: REHABILITATION | Age: 46
End: 2021-01-18
Attending: PHYSICAL MEDICINE & REHABILITATION
Payer: COMMERCIAL

## 2021-01-18 DIAGNOSIS — M54.12 CERVICAL RADICULOPATHY: ICD-10-CM

## 2021-01-18 PROCEDURE — 97110 THERAPEUTIC EXERCISES: CPT

## 2021-01-18 PROCEDURE — 97164 PT RE-EVAL EST PLAN CARE: CPT

## 2021-01-18 NOTE — OP THERAPY PROGRESS SUMMARY
Outpatient Physical Therapy  RE-EVALUATION NOTE      [facility] [place]  [address]  [facility] [address]  Phone:  [phone]  Fax:  [phone]    Date of Visit: 01/18/2021    Patient: Chaitanya Kelly  YOB: 1975  MRN: [id number]     Referring Provider: Roderick Best M.D.  [address] [facility]  [address]  [place] [address]   Referring Diagnosis Neuralgia and neuritis, unspecified [M79.2];Pain in right arm [M79.601];Other chronic pain [G89.29];Anesthesia of skin [R20.0];Paresthesia of skin [R20.2];Pain in right elbow [M25.521];Pain in right shoulder [M25.511];Other chronic pain [G89.29];Cervicalgia [M54.2];Radiculopathy, cervical region [M54.12];Bicipital tendinitis, right shoulder [M75.21];Carpal tunnel syndrome, right upper limb [G56.01];Lateral epicondylitis, right elbow [M77.11]     Visit Diagnoses     ICD-10-CM   1. Cervical radiculopathy  M54.12       Rehab Potential: good    Progress Report Period: through 01/18/21      Functional Assessment Used  PT Functional Assessment Tool Used: DASH  PT Functional Assessment Score: Raw score 67, 4 questions left blank       Objective Findings and Assessment:   Patient progression towards goals: Chaitanya Kelly has completed 4 physical therapy sessions on his current prescription. He has not been seen in this clinic since November and has reinitiate therapy after instruction from his doctor to continue. Previously therapy was too difficult and painful to continue. Since then he has received and epidural and reports less cervical and shoulder pain but continues to have numbness and tingling distal to his elbow frequently. Recommend continued physical therapy in order to further improve function, decrease pain, improve strength, decrease numbness and tingling and progress patient to full independent home exercise program. Thank you for the opportunity to assist you and your patient.    Objective findings and  assessment details: Active Range of Motion     Cervical Spine   Flexion: within functional limits (50 degrees)  Extension: Active cervical extension: 45 degrees.  Left lateral flexion: within functional limits (35 degrees)  Right lateral flexion: within functional limits (30 degrees)  Left rotation: decreased (62 degrees)  Right rotation: decreased (62 degrees)    Right Shoulder   Flexion: 155 degrees   Abduction: 150 degrees     Strength:      Upper extremities   Left upper extremity strength within functional limits  Right upper extremity strength within functional limits    Left Wrist/Hand    (2nd hand position)     Trial 1: 85    Right Wrist/Hand    (2nd hand position)     Trial 1: 85    Goals:   Short Term Goals:   1. Patient will be Independent with prescribed Home Exercise Program (HEP) and will be able to demonstrate exercises without cues for improved overall symptoms/activity tolerance.   2. Pt will improve cervical rotation by 5 degrees in each direction without increase radicular symptoms to R UE.  Short term goal time span:  2-4 weeks      Long Term Goals:    3. Pt will improve ability to use R upper extremity for ADLS for consistent 15 minutes without difficulty.   4. Pt will improve DASH score to less than 57 (raw score) indicative of improved function and reduced perceived disability.  Long term goal time span:  4-6 weeks    Plan:   Planned therapy interventions:  Neuromuscular Re-education (CPT 15889), Manual Therapy (CPT 79409), E Stim Unattended (CPT 30497) and Therapeutic Exercise (CPT 06187)  Frequency: 1-2x/week.  Duration in weeks:  6    Referring provider co-signature:  I have reviewed this plan of care and my co-signature certifies the need for services.     Certification Period: 01/18/2021 to 03/01/21    Physician Signature: ________________________________ Date: ______________

## 2021-01-19 NOTE — OP THERAPY DAILY TREATMENT
Outpatient Physical Therapy  DAILY TREATMENT     [facility] [place]  [address], [address]  [facility] [address]  Phone:  [phone]  Fax:  [phone]    Date: 01/18/2021    Patient: Chaitanya Kelly  YOB: 1975  MRN: [id number]     Time Calculation    Start time: 0100  Stop time: 0145 Time Calculation (min): 45 minutes     Chief Complaint: cervical/ arm problem  Visit #: 5    SUBJECTIVE:  See re-evaluation    OBJECTIVE:  Current objective measures: See re-evaluation   PT Functional Assessment Tool Used: DASH  PT Functional Assessment Score: Raw score 67, 4 questions left blank       Therapeutic Exercises (CPT 21856):       Therapeutic Exercise Summary: HEP instruction/performance and development. Handout provided and exercises located below:   Access Code: [email or web address]  URL: [email or web address]  Date: 01/18/2021  Prepared by: Ty Craig    Program Notes  tendon glide handout 3x a day    Exercises        Median Nerve Flossing - 12 reps - 3-4x daily      Seated Wrist Prayer Stretch - 2 reps - 20 hold - 3x daily      Standing Wrist Extensor Stretch with Arm Straight - 2 reps - 3 sets - 20 hold - 3x daily      Time-based treatments/modalities:    Physical Therapy Timed Treatment Charges  Therapeutic exercise minutes (CPT 79493): 15 minutes      Pain rating (1-10) before treatment:  4  Pain rating (1-10) after treatment:  4    ASSESSMENT:   Response to treatment: See re-eval note    PLAN/RECOMMENDATIONS:   Plan for treatment: therapy treatment to continue next visit.  Planned interventions for next visit: continue with current treatment.

## 2021-01-20 ENCOUNTER — PHYSICAL THERAPY (OUTPATIENT)
Dept: PHYSICAL THERAPY | Facility: REHABILITATION | Age: 46
End: 2021-01-20
Attending: PHYSICAL MEDICINE & REHABILITATION
Payer: COMMERCIAL

## 2021-01-20 DIAGNOSIS — M54.12 CERVICAL RADICULOPATHY: ICD-10-CM

## 2021-01-20 PROCEDURE — 97110 THERAPEUTIC EXERCISES: CPT

## 2021-01-20 NOTE — OP THERAPY DAILY TREATMENT
Outpatient Physical Therapy  DAILY TREATMENT     [facility] Outpatient Physical Therapy [facility]  [address], [address]  [facility] [address]  Phone:  [phone]  Fax:  [phone]    Date: 01/20/2021    Patient: Chaitanya Kelly  YOB: 1975  MRN: [id number]     Time Calculation    Start time: 0110  Stop time: 0135 Time Calculation (min): 25 minutes     Chief Complaint: cervical/ arm problem  Visit #: 6    SUBJECTIVE:  Pt reports that it is just weird. Cannot say whether exercises are helping or not. Reports doing them as instructed.     OBJECTIVE:  Current objective measures: 85#  strength R           Therapeutic Exercises (CPT 59724):     1. Seatd pulleys, 2min    2. Supine 1/2 roller series, angels and solderies x 15    3. Supine occipital float, rotations x 25, flexion ext x 25    4. Supine resistive horizontal abduction, x12 each orange band    5. Review of HEP below that was implemented 2 days ago.       Therapeutic Exercise Summary: HEP instruction/performance and development. Handout provided and exercises located below:   Access Code: [email or web address]  URL: [email or web address]  Date: 01/18/2021  Prepared by: Ty Craig    Program Notes  tendon glide handout 3x a day    Exercises        Median Nerve Flossing - 12 reps - 3-4x daily      Seated Wrist Prayer Stretch - 2 reps - 20 hold - 3x daily      Standing Wrist Extensor Stretch with Arm Straight - 2 reps - 3 sets - 20 hold - 3x daily      Time-based treatments/modalities:    Physical Therapy Timed Treatment Charges  Therapeutic exercise minutes (CPT 59413): 30 minutes      Pain rating (1-10) before treatment:  4  Pain rating (1-10) after treatment:  4    ASSESSMENT:   Response to treatment: pt is overall apprehensive towards movement and has a difficult time verbalizing symptoms to determine improvement is occurring. HEp to remain the same until next visit to assess change. F/u next week.     PLAN/RECOMMENDATIONS:   Plan for treatment:  therapy treatment to continue next visit.  Planned interventions for next visit: continue with current treatment.

## 2021-01-26 ENCOUNTER — PHYSICAL THERAPY (OUTPATIENT)
Dept: PHYSICAL THERAPY | Facility: REHABILITATION | Age: 46
End: 2021-01-26
Attending: PHYSICAL MEDICINE & REHABILITATION
Payer: COMMERCIAL

## 2021-01-26 DIAGNOSIS — M54.12 CERVICAL RADICULOPATHY: ICD-10-CM

## 2021-01-26 PROCEDURE — 97110 THERAPEUTIC EXERCISES: CPT

## 2021-01-28 ENCOUNTER — TELEPHONE (OUTPATIENT)
Dept: VASCULAR LAB | Facility: MEDICAL CENTER | Age: 46
End: 2021-01-28

## 2021-01-28 ENCOUNTER — APPOINTMENT (OUTPATIENT)
Dept: PHYSICAL THERAPY | Facility: REHABILITATION | Age: 46
End: 2021-01-28
Attending: PHYSICAL MEDICINE & REHABILITATION
Payer: COMMERCIAL

## 2021-01-28 NOTE — TELEPHONE ENCOUNTER
2nd attempt - LVM for pt to call back to schedule initial vascular medicine visit w/ Dr. Bloch when available.

## 2021-01-28 NOTE — OP THERAPY DAILY TREATMENT
Outpatient Physical Therapy  DAILY TREATMENT     [facility] Outpatient Physical Therapy [address], [address]  [facility] [address]  Phone:  [phone]  Fax:  [phone]    Date: 01/28/2021    Patient: Chaitanya Kelly  YOB: 1975  MRN: [id number]     Time Calculation               Chief Complaint: cervical/ arm problem  Visit #: 8    SUBJECTIVE:  Reports that his pain is more around his thumb now along with numbness and tingling when his thumb is touched. Reports feelings of tightness in the rest of his R hand. reports tremors are from medication use years ago.      OBJECTIVE:      Current objective measures:           Therapeutic Exercises (CPT 14542):     1. Seatd pulleys, 3min    2. Wrist flexion vs extension, 3# 2x 10 each    3. Wrist stretches, 30 sec x 2    4. Digiflex, lvl 1-2 x 1min each    5. Digiextend, x 1min    6. Stick forearm pendulum, x12    7. Thumb stretches 3 way, possible relief reported      Therapeutic Exercise Summary: HEP instruction/performance and development. Handout provided and exercises located below:   Access Code: [email or web address]  URL: [email or web address]  Date: 01/18/2021  Prepared by: Ty Craig    Program Notes  tendon glide handout 3x a day    Exercises        Median Nerve Flossing - 12 reps - 3-4x daily      Seated Wrist Prayer Stretch - 2 reps - 20 hold - 3x daily      Standing Wrist Extensor Stretch with Arm Straight - 2 reps - 3 sets - 20 hold - 3x daily      Time-based treatments/modalities:           Pain rating (1-10) before treatment:  4  Pain rating (1-10) after treatment:  4    ASSESSMENT:   Response to treatment: pt is overall apprehensive towards movement and has a difficult time verbalizing symptoms to determine improvement is occurring. Overall able to complete exercises above without issue. F/u in 2 days     PLAN/RECOMMENDATIONS:   Plan for treatment: therapy treatment to continue next visit.  Planned interventions for next visit:  continue with current treatment.

## 2021-01-29 ENCOUNTER — OFFICE VISIT (OUTPATIENT)
Dept: MEDICAL GROUP | Facility: LAB | Age: 46
End: 2021-01-29
Payer: COMMERCIAL

## 2021-01-29 VITALS
BODY MASS INDEX: 38.24 KG/M2 | TEMPERATURE: 99.1 F | OXYGEN SATURATION: 96 % | RESPIRATION RATE: 13 BRPM | HEART RATE: 90 BPM | WEIGHT: 298 LBS | HEIGHT: 74 IN | DIASTOLIC BLOOD PRESSURE: 90 MMHG | SYSTOLIC BLOOD PRESSURE: 148 MMHG

## 2021-01-29 DIAGNOSIS — M54.12 CERVICAL RADICULOPATHY: ICD-10-CM

## 2021-01-29 DIAGNOSIS — R60.9 PERIPHERAL EDEMA: ICD-10-CM

## 2021-01-29 PROCEDURE — 99213 OFFICE O/P EST LOW 20 MIN: CPT | Performed by: FAMILY MEDICINE

## 2021-01-29 ASSESSMENT — ENCOUNTER SYMPTOMS
SHORTNESS OF BREATH: 0
WHEEZING: 0
WEAKNESS: 1
TINGLING: 1
BLURRED VISION: 0
NECK PAIN: 1
DOUBLE VISION: 0

## 2021-01-29 ASSESSMENT — FIBROSIS 4 INDEX: FIB4 SCORE: 0.33

## 2021-01-29 NOTE — PROGRESS NOTES
"Subjective:   Chaitanya Kelly is a 45 y.o. male here today for   Chief Complaint   Patient presents with   • Other     Disability follow up, phylicia requires chart notes. stating extension          ***    Allergies   Allergen Reactions   • Ibuprofen Unspecified     Interaction with Lithium   • Latex Itching   • Naprosyn [Naproxen] Unspecified     Interaction with Lithium         Current medicines (including changes today)  Current Outpatient Medications   Medication Sig Dispense Refill   • triamcinolone acetonide (KENALOG) 0.1 % Cream Apply to affected area BID x7 days 80 g 0   • amLODIPine (NORVASC) 10 MG Tab Take 1 Tab by mouth every day. 30 Tab 1   • cyclobenzaprine (FLEXERIL) 10 mg Tab Take 0.5-1 Tabs by mouth 3 times a day as needed for Mild Pain, Moderate Pain or Muscle Spasms. 90 Tab 3   • acetaminophen (TYLENOL) 500 MG Tab Take 2 Tabs by mouth 3 times a day as needed (pain.  Do not exceed 3000 mg of total acetaminophen per day). 180 Tab 6   • meclizine (ANTIVERT) 25 MG Tab Take 1 Tab by mouth 3 times a day as needed. 30 Tab 3   • buPROPion (WELLBUTRIN XL) 150 MG XL tablet Take 1 Tab by mouth 2 Times a Day. 90 Tab 0   • lamoTRIgine (LAMICTAL) 100 MG Tab TAKE 1 TABLET BY MOUTH ONCE DAILY IN THE MORNING 90 Tab 1   • lithium CR (ESKALITH CR) 450 MG Tab CR TAKE 1 TABLET BY MOUTH TWICE DAILY 180 Tab 1     No current facility-administered medications for this visit.      He  has a past medical history of Anxiety, ASTHMA, Bipolar 2 disorder, Bipolar disorder ([facility]), Headache(784.0), Migraine, Pain (10/10/2017), and Seizure ([facility]) (1980).    ROS   ROS       Objective:     Physical Exam:  /90 (BP Location: Right arm, Patient Position: Sitting, BP Cuff Size: Adult)   Pulse 90   Temp 37.3 °C (99.1 °F) (Temporal)   Resp 13   Ht 1.88 m (6' 2.02\")   Wt (!) 135 kg (298 lb)   SpO2 96%  Body mass index is 38.24 kg/m². ***  Constitutional: Alert, no distress.  Skin: Warm, dry, good turgor, no rashes in visible " areas.  Eye: Equal, round and reactive, conjunctiva clear, lids normal.  ENMT: TM's clear bilaterally, lips without lesions, good dentition, oropharynx clear.  Neck: Trachea midline, no masses, no thyromegaly. No cervical or supraclavicular lymphadenopathy.  Respiratory: Unlabored respiratory effort, lungs clear to auscultation, no wheezes, no rhonchi.  Cardiovascular: Normal S1, S2, no murmur, no edema.  Abdomen: Soft, non-tender, no masses, no hepatosplenomegaly.  Psych: Alert and oriented x3, normal affect and mood.    Assessment and Plan:     There are no diagnoses linked to this encounter.    Followup: No follow-ups on file.         PLEASE NOTE: This dictation was created using voice recognition software. I have made every reasonable attempt to correct obvious errors, but I expect that there are errors of grammar and possibly content that I did not discover before finalizing the note.

## 2021-01-29 NOTE — PROGRESS NOTES
Subjective:   Chaitanya Kelly is a 45 y.o. male here today for   Chief Complaint   Patient presents with   • Other     Disability follow up, phylicia requires chart notes. stating extension        #Leg edema:  Patient with longstanding history of hypertension, currently being treated with amlodipine.  He is not on any ACE, ARB, diuretic given concerns of interactions with the lithium.  He has been working on contacting and getting in with vascular medicine for further evaluation and treatment.  Meanwhile he has continued and extensive peripheral edema in his lower extremities bilaterally.  He states that it is gotten so bad that he can no longer lace up shoes.  This causes extensive amounts of pain in his legs especially when standing.  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  He is here today requesting extension of his disability paperwork.    #Cervical radiculopathy:  -Patient with longstanding history of radiculopathy in his right arm with symptoms of weakness, numbness, tingling which is affected his ability to complete his job.  He has been working with physiatry, physical therapy and has seen some improvement; however, continues to have significant deficits that he is concerned was not conducive to full-time work and requesting extension of his disability paperwork.      Allergies   Allergen Reactions   • Ibuprofen Unspecified     Interaction with Lithium   • Latex Itching   • Naprosyn [Naproxen] Unspecified     Interaction with Lithium         Current medicines (including changes today)  Current Outpatient Medications   Medication Sig Dispense Refill   • triamcinolone acetonide (KENALOG) 0.1 % Cream Apply to affected area BID x7 days 80 g 0   • amLODIPine (NORVASC) 10 MG Tab Take 1 Tab by mouth every day. 30 Tab 1   • cyclobenzaprine (FLEXERIL) 10 mg Tab Take 0.5-1 Tabs by mouth 3 times a day as needed for Mild Pain, Moderate Pain  "or Muscle Spasms. 90 Tab 3   • acetaminophen (TYLENOL) 500 MG Tab Take 2 Tabs by mouth 3 times a day as needed (pain.  Do not exceed 3000 mg of total acetaminophen per day). 180 Tab 6   • meclizine (ANTIVERT) 25 MG Tab Take 1 Tab by mouth 3 times a day as needed. 30 Tab 3   • buPROPion (WELLBUTRIN XL) 150 MG XL tablet Take 1 Tab by mouth 2 Times a Day. 90 Tab 0   • lamoTRIgine (LAMICTAL) 100 MG Tab TAKE 1 TABLET BY MOUTH ONCE DAILY IN THE MORNING 90 Tab 1   • lithium CR (ESKALITH CR) 450 MG Tab CR TAKE 1 TABLET BY MOUTH TWICE DAILY 180 Tab 1     No current facility-administered medications for this visit.      He  has a past medical history of Anxiety, ASTHMA, Bipolar 2 disorder, Bipolar disorder (HCC), Headache(784.0), Migraine, Pain (10/10/2017), and Seizure ([facility]) (1980).    ROS   Review of Systems   Eyes: Negative for blurred vision and double vision.   Respiratory: Negative for shortness of breath and wheezing.    Cardiovascular: Positive for leg swelling.   Musculoskeletal: Positive for neck pain.   Neurological: Positive for tingling and weakness.        Objective:     Physical Exam:  /90 (BP Location: Right arm, Patient Position: Sitting, BP Cuff Size: Adult)   Pulse 90   Temp 37.3 °C (99.1 °F) (Temporal)   Resp 13   Ht 1.88 m (6' 2.02\")   Wt (!) 135 kg (298 lb)   SpO2 96%  Body mass index is 38.24 kg/m².   Constitutional: Alert, no distress.  Skin: Warm, dry, good turgor, no rashes in visible areas.  Respiratory: Unlabored respiratory effort, lungs clear to auscultation, no wheezes, no rhonchi.  Cardiovascular: Normal S1, S2, no murmur. +2-+3 pitting edema up to mid tib-fib bilaterally.   Abdomen: Soft, non-tender, no masses, no hepatosplenomegaly.  Psych: Alert and oriented x3, normal affect and mood.    Assessment and Plan:     1. Cervical radiculopathy  -At this time physical examination was most benign with exception of a slight decrease in  strength on the right side.  Otherwise fully " intact sensation, range of motion, strength.  It is difficult to ascertain exactly the progress (or lack thereof) that patient has been having given this is my first time evaluating him.  Given the slight deficits as well as review of previous paperwork I will extend FMLA out to March 1, 2021.  -Encourage patient to follow-up with Dr. Best for EMG for further evaluation.  -Return precautions were given, follow-up as needed.  I have encourage patient to follow-up with physical therapy, physiatry for further treatment.    2. Peripheral edema  -Secondary to amlodipine.  It is difficult to find the appropriate medication regimen given his concerns regarding the lithium.  While most antihypertensives do work at the level of the kidney, there are other options that can regulate blood pressure outside the kidney, namely a beta-blocker.  Patient is unsure if this medication is ever tried before.  He will be establishing with vascular medicine for further evaluation and treatment on this.  Hopefully we can find a medication that will decrease the peripheral edema and allow him to return to work.  We will fill out [facility] paperwork when it is brought to my attention.        Followup: Return if symptoms worsen or fail to improve.         PLEASE NOTE: This dictation was created using voice recognition software. I have made every reasonable attempt to correct obvious errors, but I expect that there are errors of grammar and possibly content that I did not discover before finalizing the note.

## 2021-01-30 DIAGNOSIS — I10 ESSENTIAL HYPERTENSION: ICD-10-CM

## 2021-02-01 ENCOUNTER — OFFICE VISIT (OUTPATIENT)
Dept: PHYSICAL MEDICINE AND REHAB | Facility: MEDICAL CENTER | Age: 46
End: 2021-02-01
Payer: COMMERCIAL

## 2021-02-01 VITALS
TEMPERATURE: 98.7 F | SYSTOLIC BLOOD PRESSURE: 140 MMHG | HEIGHT: 75 IN | HEART RATE: 87 BPM | WEIGHT: 298.5 LBS | DIASTOLIC BLOOD PRESSURE: 78 MMHG | BODY MASS INDEX: 37.12 KG/M2 | OXYGEN SATURATION: 100 %

## 2021-02-01 DIAGNOSIS — R20.2 NUMBNESS AND TINGLING OF RIGHT HAND: ICD-10-CM

## 2021-02-01 DIAGNOSIS — G89.29 CHRONIC PAIN OF RIGHT UPPER EXTREMITY: ICD-10-CM

## 2021-02-01 DIAGNOSIS — M25.511 CHRONIC RIGHT SHOULDER PAIN: ICD-10-CM

## 2021-02-01 DIAGNOSIS — R20.0 NUMBNESS AND TINGLING OF RIGHT HAND: ICD-10-CM

## 2021-02-01 DIAGNOSIS — G56.01 RIGHT CARPAL TUNNEL SYNDROME: ICD-10-CM

## 2021-02-01 DIAGNOSIS — G89.29 CHRONIC RIGHT SHOULDER PAIN: ICD-10-CM

## 2021-02-01 DIAGNOSIS — M79.2 NEURALGIA: ICD-10-CM

## 2021-02-01 DIAGNOSIS — M79.601 CHRONIC PAIN OF RIGHT UPPER EXTREMITY: ICD-10-CM

## 2021-02-01 DIAGNOSIS — M25.521 RIGHT ELBOW PAIN: ICD-10-CM

## 2021-02-01 DIAGNOSIS — M54.12 CERVICAL RADICULOPATHY: ICD-10-CM

## 2021-02-01 PROCEDURE — 99214 OFFICE O/P EST MOD 30 MIN: CPT | Performed by: PHYSICAL MEDICINE & REHABILITATION

## 2021-02-01 RX ORDER — AMLODIPINE BESYLATE 10 MG/1
TABLET ORAL
Qty: 30 TAB | Refills: 4 | Status: SHIPPED | OUTPATIENT
Start: 2021-02-01 | End: 2021-02-22

## 2021-02-01 ASSESSMENT — PAIN SCALES - GENERAL: PAINLEVEL: 3=SLIGHT PAIN

## 2021-02-01 ASSESSMENT — PATIENT HEALTH QUESTIONNAIRE - PHQ9
SUM OF ALL RESPONSES TO PHQ QUESTIONS 1-9: 9
CLINICAL INTERPRETATION OF PHQ2 SCORE: 2
5. POOR APPETITE OR OVEREATING: 1 - SEVERAL DAYS

## 2021-02-01 ASSESSMENT — FIBROSIS 4 INDEX: FIB4 SCORE: 0.33

## 2021-02-01 NOTE — TELEPHONE ENCOUNTER
Received request via: Pharmacy    Was the patient seen in the last year in this department? Yes  1/29/21  Does the patient have an active prescription (recently filled or refills available) for medication(s) requested?NO

## 2021-02-01 NOTE — PROGRESS NOTES
Follow up patient note  Interventional spine and sports physiatry, Physical medicine rehabilitation      Chief complaint:   Chief Complaint   Patient presents with   • Follow-Up     Shoulder pain          HISTORY    Please see new patient note by Dr Best,  for more details.     HPI  Patient identification: Chaitanya Kelly ,  1975,   With Diagnoses of Cervical radiculopathy, Neuralgia, Chronic pain of right upper extremity, Numbness and tingling of right hand, Right elbow pain, Chronic right shoulder pain, and Right carpal tunnel syndrome were pertinent to this visit.     Procedures:   2020 C7-T1  Cervical interlaminar epidural steroid injection pre procedure pain 9/10, post procedure pain 3/10    After the epidural the patient had a significant improvement in his pain. Pre procedure pain 9/10, post procedure pain 3/10. Neck pain nearly resolved. Shoulder pain is mild and minimal intermittent. He still has pain in the right wrist radiating to the first three digits down to the fingertips mostly on the palmar aspect. Intermittent. This can be 6/10 at times.          ROS Red Flags :   Fever, Chills, Sweats: Denies  Involuntary Weight Loss: Denies  Bowel/Bladder Incontinence: Denies  Saddle Anesthesia: Denies        PMHx:   Past Medical History:   Diagnosis Date   • Anxiety    • ASTHMA    • Bipolar 2 disorder    • Bipolar disorder (HCC)     depression and anxiety   • Headache(784.0)    • Migraine    • Pain 10/10/2017    umbilical pain   • Seizure (HCC) 1980    related to Ritalin       PSHx:   Past Surgical History:   Procedure Laterality Date   • BLOCK EPIDURAL STEROID INJECTION N/A 2020    Procedure: INJECTION, STEROID, EPIDURAL;  Surgeon: Roderick Best M.D.;  [facility];  Service: Pain Management   • UMBILICAL HERNIA REPAIR N/A 10/18/2017    Procedure: UMBILICAL HERNIA REPAIR;  Surgeon: Chinedu Kamara M.D.;  Location: SUR[facility];  Service: General        Family history   Family History   Problem Relation Age of Onset   • Heart Disease Father 41        heart attack   • Heart Disease Paternal Aunt    • Heart Disease Paternal Uncle    • Stroke Paternal Uncle    • Heart Disease Paternal Grandfather    • Cancer Maternal Aunt    • Lung Cancer Maternal Aunt          Medications:   Outpatient Medications Marked as Taking for the 2/1/21 encounter (Office Visit) with Roderick Best M.D.   Medication Sig Dispense Refill   • triamcinolone acetonide (KENALOG) 0.1 % Cream Apply to affected area BID x7 days 80 g 0   • amLODIPine (NORVASC) 10 MG Tab Take 1 Tab by mouth every day. 30 Tab 1   • cyclobenzaprine (FLEXERIL) 10 mg Tab Take 0.5-1 Tabs by mouth 3 times a day as needed for Mild Pain, Moderate Pain or Muscle Spasms. 90 Tab 3   • acetaminophen (TYLENOL) 500 MG Tab Take 2 Tabs by mouth 3 times a day as needed (pain.  Do not exceed 3000 mg of total acetaminophen per day). 180 Tab 6   • meclizine (ANTIVERT) 25 MG Tab Take 1 Tab by mouth 3 times a day as needed. 30 Tab 3   • buPROPion (WELLBUTRIN XL) 150 MG XL tablet Take 1 Tab by mouth 2 Times a Day. 90 Tab 0   • lamoTRIgine (LAMICTAL) 100 MG Tab TAKE 1 TABLET BY MOUTH ONCE DAILY IN THE MORNING 90 Tab 1   • lithium CR (ESKALITH CR) 450 MG Tab CR TAKE 1 TABLET BY MOUTH TWICE DAILY 180 Tab 1        Current Outpatient Medications on File Prior to Visit   Medication Sig Dispense Refill   • triamcinolone acetonide (KENALOG) 0.1 % Cream Apply to affected area BID x7 days 80 g 0   • amLODIPine (NORVASC) 10 MG Tab Take 1 Tab by mouth every day. 30 Tab 1   • cyclobenzaprine (FLEXERIL) 10 mg Tab Take 0.5-1 Tabs by mouth 3 times a day as needed for Mild Pain, Moderate Pain or Muscle Spasms. 90 Tab 3   • acetaminophen (TYLENOL) 500 MG Tab Take 2 Tabs by mouth 3 times a day as needed (pain.  Do not exceed 3000 mg of total acetaminophen per day). 180 Tab 6   • meclizine (ANTIVERT) 25 MG Tab Take 1 Tab by mouth 3 times a day as needed.  30 Tab 3   • buPROPion (WELLBUTRIN XL) 150 MG XL tablet Take 1 Tab by mouth 2 Times a Day. 90 Tab 0   • lamoTRIgine (LAMICTAL) 100 MG Tab TAKE 1 TABLET BY MOUTH ONCE DAILY IN THE MORNING 90 Tab 1   • lithium CR (ESKALITH CR) 450 MG Tab CR TAKE 1 TABLET BY MOUTH TWICE DAILY 180 Tab 1     No current facility-administered medications on file prior to visit.          Allergies:   Allergies   Allergen Reactions   • Ibuprofen Unspecified     Interaction with Lithium   • Latex Itching   • Naprosyn [Naproxen] Unspecified     Interaction with Lithium       Social Hx:   Social History     Socioeconomic History   • Marital status:      Spouse name: Not on file   • Number of children: Not on file   • Years of education: Not on file   • Highest education level: Not on file   Occupational History   • Not on file   Social Needs   • Financial resource strain: Not on file   • Food insecurity     Worry: Not on file     Inability: Not on file   • Transportation needs     Medical: Not on file     Non-medical: Not on file   Tobacco Use   • Smoking status: Former Smoker     Types: Cigarettes     Quit date: 1/1/2006     Years since quitting: 15.0   • Smokeless tobacco: Never Used   • Tobacco comment: off and on from age [age]   Substance and Sexual Activity   • Alcohol use: No   • Drug use: No   • Sexual activity: Yes     Partners: Female   Lifestyle   • Physical activity     Days per week: Not on file     Minutes per session: Not on file   • Stress: Not on file   Relationships   • Social connections     Talks on phone: Not on file     Gets together: Not on file     Attends [employer] service: Not on file     Active member of club or organization: Not on file     Attends meetings of clubs or organizations: Not on file     Relationship status: Not on file   • Intimate partner violence     Fear of current or ex partner: Not on file     Emotionally abused: Not on file     Physically abused: Not on file     Forced sexual activity: Not  "on file   Other Topics Concern   •  Service No   • Blood Transfusions No   • Caffeine Concern No   • Occupational Exposure No   • Hobby Hazards No   • Sleep Concern Yes   • Stress Concern Yes   • Weight Concern Yes   • Special Diet No   • Back Care No   • Exercise No   • Bike Helmet No   • Seat Belt Yes   • Self-Exams No   Social History Narrative   • Not on file         EXAMINATION     Physical Exam:   Vitals: /78 (BP Location: Left arm, Patient Position: Sitting, BP Cuff Size: Adult)   Pulse 87   Temp 37.1 °C (98.7 °F) (Temporal)   Ht 1.905 m (6' 3\")   Wt (!) 135 kg (298 lb 8.1 oz)   SpO2 100%     Constitutional:   Body Habitus: Body mass index is 37.31 kg/m².  Cooperation: Fully cooperates with exam  Appearance: Well-groomed no disheveled    Respiratory-  breathing comfortable on room air, no audible wheezing  Cardiovascular- capillary refills less than 2 seconds. No lower extremity edema is noted.   Psychiatric- alert and oriented ×3. Normal affect.    MSK and Neuro: -    Cervical spine  There are no signs of infection around the injection sites.     full  active range of motion with flexion, lateral flexion, and rotation bilaterally.   There is full  active range of motion with cervical extension.      Palpation:   Tenderness to palpation throughout the cervical spine: negative bilaterally        Lumbar spine Special tests  Neuro tension  Spurling's maneuver negative bilaterally           Key points for the international standards for neurological classification of spinal cord injury (ISNCSCI) to light touch.     Dermatome R L   C4 2 2   C5 2 2   C6 2 2   C7 2 2   C8 2 2   T1 2 2   T2 2 2                                         Motor Exam Upper Extremities   ? Myotome R L   Shoulder flexion C5 5 5   Elbow flexion C5 5 5   Wrist extension C6 5 5   Elbow extension C7 5 5   Finger flexion C8 5 5   Finger abduction T1 5 5                   MEDICAL DECISION MAKING    DATA    Labs:   Lab Results "   Component Value Date/Time    SODIUM 137 11/05/2020 02:23 PM    POTASSIUM 3.6 11/05/2020 02:23 PM    CHLORIDE 102 11/05/2020 02:23 PM    CO2 22 11/05/2020 02:23 PM    GLUCOSE 78 11/05/2020 02:23 PM    BUN 14 11/05/2020 02:23 PM    CREATININE 1.03 11/05/2020 02:23 PM        No results found for: PROTHROMBTM, INR     Lab Results   Component Value Date/Time    WBC 8.3 11/05/2020 02:23 PM    RBC 5.67 11/05/2020 02:23 PM    HEMOGLOBIN 15.8 11/05/2020 02:23 PM    HEMATOCRIT 48.8 11/05/2020 02:23 PM    MCV 86.1 11/05/2020 02:23 PM    MCH 27.9 11/05/2020 02:23 PM    MCHC 32.4 (L) 11/05/2020 02:23 PM    MPV 9.9 11/05/2020 02:23 PM    NEUTSPOLYS 68.60 11/05/2020 02:23 PM    LYMPHOCYTES 19.90 (L) 11/05/2020 02:23 PM    MONOCYTES 8.90 11/05/2020 02:23 PM    EOSINOPHILS 1.30 11/05/2020 02:23 PM    BASOPHILS 0.80 11/05/2020 02:23 PM    HYPOCHROMIA 1+ 01/29/2014 10:07 AM        Lab Results   Component Value Date/Time    HBA1C 5.5 10/02/2020 09:48 AM          Imaging:     I personally reviewed following images, these are my reads  MRI cervical spine 10/8/2020  There is degenerative changes and disc bulges at C5-6 and C6-7.  No high-grade central canal stenosis is seen.  At C5-6 there is moderate right and mild left neuroforaminal stenosis.  At C6-7 there is a least moderate grade bilateral neuroforaminal stenosis.     I reviewed the fluoroscopic images from 12/11/2020 showing successful C7-T1 interlaminar epidural steroid injection.  I reviewed these with the patient on 2/1/2021        IMAGING radiology reads. I reviewed the following radiology reads                  Results for orders placed during the hospital encounter of 10/10/17   MR-BRAIN-W/O     Impression 1.  Left anterior frontal convexity 36 mm arachnoid cyst or leptomeningeal cyst. This is consistent with a long-standing finding as there is remodeling of the adjacent inner table of the skull.  2.  Otherwise, MRI of the brain without contrast within normal limits.                           Results for orders placed in visit on 10/08/20   MR-CERVICAL SPINE-W/O                                                                                                                                                                Results for orders placed during the hospital encounter of 11   DX-SHOULDER 2+     Impression No radiographic evidence of acute traumatic injury.                        DIAGNOSIS   Visit Diagnoses     ICD-10-CM   1. Cervical radiculopathy  M54.12   2. Neuralgia  M79.2   3. Chronic pain of right upper extremity  M79.601    G89.29   4. Numbness and tingling of right hand  R20.0    R20.2   5. Right elbow pain  M25.521   6. Chronic right shoulder pain  M25.511    G89.29   7. Right carpal tunnel syndrome  G56.01       DIAGNOSTIC ULTRASOUND  10/29/2020 I performed a limited diagnostic ultrasound for the patient's neuralgia ICD M79.2. I performed a limited diagnostic ultrasound of the RIGHT wrist including the median nerve to evaluate the cause of the patient's neuralgia. The median nerve which shows a cross-sectional area of 14 mm² at the level of the carpal tunnel outlet and 8 mm² at the level of the pronator quadratus. This is consistent with carpal tunnel syndrome.  The images were uploaded to the medical record.      ASSESSMENT and PLAN:     Chaitanya Wetzelcash  1975 male      Chaitanya was seen today for follow-up.    Diagnoses and all orders for this visit:    Cervical radiculopathy  -     REFERRAL TO EMG - PHYSIATRY (PMR)    Neuralgia  -     REFERRAL TO EMG - PHYSIATRY (PMR)    Chronic pain of right upper extremity  -     REFERRAL TO EMG - PHYSIATRY (PMR)    Numbness and tingling of right hand  -     REFERRAL TO EMG - PHYSIATRY (PMR)    Right elbow pain  -     REFERRAL TO EMG - PHYSIATRY (PMR)    Chronic right shoulder pain  -     REFERRAL TO EMG - PHYSIATRY (PMR)    Right carpal tunnel syndrome  -     REFERRAL TO EMG - PHYSIATRY (PMR)  -     REFERRAL TO  PHYSIATRY (PMR)        The patient's neck pain rating down the arm secondary to a cervical radiculopathy is dramatically improved, his neck pain is also significantly improved and he is tolerating his home exercise program physical therapy.  The pain that he is having in his right hand which is residual I believe is mostly secondary to his carpal tunnel.  He has been using his neutral wrist splints at night including the past several months and continues to have pain and functional deficits from his hand and wrist symptoms.    I have ordered an EMG of the right upper extremity for further work-up and if there is evidence of carpal tunnel syndrome we will consider him for a carpal tunnel injection after this.    He can continue Flexeril as needed.    Follow up: 2/23/2021     Thank you for allowing me to participate in the care of this patient. If you have any questions please not hesitate to contact me.             Please note that this dictation was created using voice recognition software. I have made every reasonable attempt to correct obvious errors but there may be errors of grammar and content that I may have overlooked prior to finalization of this note.      Roderick Best MD  Interventional Spine and Sports Physiatry  Physical Medicine and Rehabilitation  [facility]

## 2021-02-08 ENCOUNTER — PHYSICAL THERAPY (OUTPATIENT)
Dept: PHYSICAL THERAPY | Facility: REHABILITATION | Age: 46
End: 2021-02-08
Attending: PHYSICAL MEDICINE & REHABILITATION
Payer: COMMERCIAL

## 2021-02-08 DIAGNOSIS — M54.12 CERVICAL RADICULOPATHY: ICD-10-CM

## 2021-02-08 PROCEDURE — 97110 THERAPEUTIC EXERCISES: CPT

## 2021-02-08 NOTE — OP THERAPY DAILY TREATMENT
Outpatient Physical Therapy  DAILY TREATMENT     [facility] [place]  [address], [address]  [place] [address]  Phone:  [phone]  Fax:  [phone]    Date: 02/08/2021    Patient: Chaitanya Kelly  YOB: 1975  MRN: [id number]     Time Calculation    Start time: 0133  Stop time: 0200 Time Calculation (min): 27 minutes     Chief Complaint: cervical/ arm problem  Visit #: 8    SUBJECTIVE:  Reports that numbness and tingling in the hand is unchanged and constant overall no improvement since last visit. Is going to have EMG/ NCS and possible injection at carpal tunnel.     OBJECTIVE:  -tinnels @ carpal tunnel no increased symptoms reported. Wrist flexion mobility WNL.           Therapeutic Exercises (CPT 84659):     1. Tendon glides, review x 2min    2. Wrist flexion vs extension, 3# 2x 10 each    3. Wrist stretches, 30 sec x 2    4. Digiflex, lvl 1-2 x 1min each    5. Digiextend, x 1min    6. Stick forearm pendulum, x12    7. Thumb stretches 3 way, possible relief reported      Therapeutic Exercise Summary: HEP instruction/performance and development. Handout provided and exercises located below:   Access Code: [email or web address]  URL: [email or web address]  Date: 01/18/2021  Prepared by: Ty Craig    Program Notes  tendon glide handout 3x a day    Exercises        Median Nerve Flossing - 12 reps - 3-4x daily      Seated Wrist Prayer Stretch - 2 reps - 20 hold - 3x daily      Standing Wrist Extensor Stretch with Arm Straight - 2 reps - 3 sets - 20 hold - 3x daily      Time-based treatments/modalities:    Physical Therapy Timed Treatment Charges  Therapeutic exercise minutes (CPT 20803): 27 minutes      Pain rating (1-10) before treatment: 2-4  Pain rating (1-10) after treatment:  2-4    ASSESSMENT:   Response to treatment: Reports of symptoms overall unchanged. ENG and NCS is appropriate given symptoms and difficulty in making a change with said symptoms.      PLAN/RECOMMENDATIONS:   Plan for treatment: therapy treatment to continue next visit.  Planned interventions for next visit: continue with current treatment.

## 2021-02-10 ENCOUNTER — APPOINTMENT (OUTPATIENT)
Dept: PHYSICAL THERAPY | Facility: REHABILITATION | Age: 46
End: 2021-02-10
Attending: PHYSICAL MEDICINE & REHABILITATION
Payer: COMMERCIAL

## 2021-02-16 ENCOUNTER — PHYSICAL THERAPY (OUTPATIENT)
Dept: PHYSICAL THERAPY | Facility: REHABILITATION | Age: 46
End: 2021-02-16
Attending: PHYSICAL MEDICINE & REHABILITATION
Payer: COMMERCIAL

## 2021-02-16 DIAGNOSIS — M54.12 CERVICAL RADICULOPATHY: ICD-10-CM

## 2021-02-16 PROCEDURE — 97110 THERAPEUTIC EXERCISES: CPT

## 2021-02-16 NOTE — OP THERAPY DAILY TREATMENT
Outpatient Physical Therapy  DAILY TREATMENT     [facility] [place]  [address], [address]  [place] [address]  Phone:  [phone]  Fax:  [phone]    Date: 02/16/2021    Patient: Chaitanya Kelly  YOB: 1975  MRN: [id number]     Time Calculation    Start time: 1430  Stop time: 1505 Time Calculation (min): 35 minutes     Chief Complaint: cervical/ arm problem  Visit #: 9    SUBJECTIVE:  No reported changes, numbness and tingling in R hand continues and travels up anterior forearm wrist.    OBJECTIVE:  -tinnels @ carpal tunnel no increased symptoms reported. Wrist flexion mobility WNL. tinels negative, phalens positive within 10 seconds with reports of 4th and 5th finger tingling.  strength 85#          Therapeutic Exercises (CPT 79865):     1. Tendon glides, review     2. Wrist flexion vs extension, 3# extension 2x 12, 4# flexion 2x 12    3. Wrist stretches, 30 sec x 4    4. Digiflex, lvl 1-2 x 1min each    5. Digiextend, x 1min    6. Stick forearm pendulum, x12    7. Thumb stretches 3 way, 30 sec x 2 each    8. Seated pulleys, 3min      Time-based treatments/modalities:    Physical Therapy Timed Treatment Charges  Therapeutic exercise minutes (CPT 07304): 35 minutes    Pain rating (1-10) before treatment: 2-4  Pain rating (1-10) after treatment:  2-4    ASSESSMENT:   Response to treatment: Reports of symptoms overall unchanged. EMG/NCS appropriate given presentation of symptoms and difficulty in making a change with said symptoms.Pt to f/u with said testing prior to future visits.       PLAN/RECOMMENDATIONS:   Plan for treatment: therapy treatment to continue next visit.  Planned interventions for next visit: continue with current treatment.on hold til after NCS.

## 2021-02-18 ENCOUNTER — APPOINTMENT (OUTPATIENT)
Dept: PHYSICAL THERAPY | Facility: REHABILITATION | Age: 46
End: 2021-02-18
Attending: PHYSICAL MEDICINE & REHABILITATION
Payer: COMMERCIAL

## 2021-02-22 ENCOUNTER — OFFICE VISIT (OUTPATIENT)
Dept: VASCULAR LAB | Facility: MEDICAL CENTER | Age: 46
End: 2021-02-22
Attending: FAMILY MEDICINE
Payer: COMMERCIAL

## 2021-02-22 VITALS
HEIGHT: 74 IN | BODY MASS INDEX: 37.22 KG/M2 | DIASTOLIC BLOOD PRESSURE: 79 MMHG | SYSTOLIC BLOOD PRESSURE: 146 MMHG | HEART RATE: 89 BPM | WEIGHT: 290 LBS

## 2021-02-22 DIAGNOSIS — G43.009 MIGRAINE WITHOUT AURA AND WITHOUT STATUS MIGRAINOSUS, NOT INTRACTABLE: ICD-10-CM

## 2021-02-22 DIAGNOSIS — Z91.89 OTHER SPECIFIED PERSONAL RISK FACTORS, NOT ELSEWHERE CLASSIFIED: ICD-10-CM

## 2021-02-22 DIAGNOSIS — I10 ESSENTIAL HYPERTENSION: ICD-10-CM

## 2021-02-22 DIAGNOSIS — E78.5 DYSLIPIDEMIA: ICD-10-CM

## 2021-02-22 DIAGNOSIS — K90.0 CELIAC DISEASE: ICD-10-CM

## 2021-02-22 DIAGNOSIS — Z82.49 FAMILY HISTORY OF EARLY CAD: ICD-10-CM

## 2021-02-22 DIAGNOSIS — G47.33 OBSTRUCTIVE SLEEP APNEA: ICD-10-CM

## 2021-02-22 DIAGNOSIS — F31.32 BIPOLAR 1 DISORDER, DEPRESSED, MODERATE (HCC): ICD-10-CM

## 2021-02-22 DIAGNOSIS — E66.9 OBESITY (BMI 30-39.9): ICD-10-CM

## 2021-02-22 DIAGNOSIS — R60.0 LOCALIZED EDEMA: ICD-10-CM

## 2021-02-22 PROCEDURE — 99204 OFFICE O/P NEW MOD 45 MIN: CPT | Performed by: FAMILY MEDICINE

## 2021-02-22 PROCEDURE — 99212 OFFICE O/P EST SF 10 MIN: CPT

## 2021-02-22 RX ORDER — DOXAZOSIN MESYLATE 1 MG/1
1 TABLET ORAL
Qty: 90 TABLET | Refills: 3 | Status: SHIPPED | OUTPATIENT
Start: 2021-02-22 | End: 2021-04-19 | Stop reason: SDUPTHER

## 2021-02-22 RX ORDER — VERAPAMIL HYDROCHLORIDE 240 MG/1
240 TABLET, FILM COATED, EXTENDED RELEASE ORAL DAILY
Qty: 90 TABLET | Refills: 3 | Status: SHIPPED | OUTPATIENT
Start: 2021-02-22 | End: 2022-02-17

## 2021-02-22 ASSESSMENT — ENCOUNTER SYMPTOMS
MYALGIAS: 0
ABDOMINAL PAIN: 0
HEADACHES: 0
VOMITING: 0
CHILLS: 0
HEMOPTYSIS: 0
WEAKNESS: 0
PALPITATIONS: 0
DIZZINESS: 0
TREMORS: 0
SEIZURES: 0
DIARRHEA: 0
BRUISES/BLEEDS EASILY: 0
NAUSEA: 0
FOCAL WEAKNESS: 0
SHORTNESS OF BREATH: 0
COUGH: 0
FEVER: 0
WHEEZING: 0

## 2021-02-22 ASSESSMENT — FIBROSIS 4 INDEX: FIB4 SCORE: 0.33

## 2021-02-22 NOTE — PROGRESS NOTES
INITIAL VASCULAR MEDICINE VISIT  Subjective:   Chaitanya Kelly is a 45 y.o. y.o. male  who presents today 20  for   Chief Complaint   Patient presents with   • Office Visit     DX:Essential hypertension     initially referred by Cesilia Dotson PA-c for eval and mgmt of HTN      HPI:    HTN:  Current HTN concerns: has uncontrolled HTN, PCP has started amlodipine and had some swelling, so reduced to 5mg daily.  Prior Rx for ACEI and HCTZ but concern raised about raising lithium levels and nephrotoxicity.  Takes due to bipolar.   Current ADRs: notable edema with leg tenseness and impact on walking distances.  No claudication reported.   HTN sx:  No current blurred or changed vision, chest pain, shortness of breath, headache, nausea, dizziness/vertigo   Home BP los/80s  24h ABPM completed: not tested  Adherence to current HTN meds: compliant all of the time  Lifestyle factors:   Weight Change since last visit: mild reduction but has been on disability due to arm   BMI Readings from Last 5 Encounters:   21 37.23 kg/m²   21 37.31 kg/m²   21 38.24 kg/m²   21 37.88 kg/m²   20 37.88 kg/m²   Diet pattern changes since last visit: common adult  Daily salt intake estimate:  Low     EtOH: No  Exercise habits: no regular exercise program  Perceived barriers to care: limited exercise      Pertinent HTN hx (reviewed at initial visit):  Age at HTN dx: 43  Past HTN medications: amlodipine, acei, hctz - stopped due to Li   HTN crises:  No prior hospitalization or crises   Interfering substances/contributing factors:  Antidepressants (TCS, SNRI, MOAI):  Wellbutrin    Hyperlipidemia:    No prior meds or dx.   Last lipid profile: reviewed with patient, as noted below   No advanced lipid profiles or CAC in the past.    Hx of clinical ASCVD events: None   Strong fhx - see below     Antiplatelet/anticoagulation:   No    Type 2 DM:  No     CKD:    No     Sleeping disorder/TOBI:   Stable  overall.  Currently on CPAP, good adherence, feeling good energy, well-rested in the AM, and no daytime somnolence      Hypothyroidism:   No     Past Medical History:   Diagnosis Date   • Anxiety    • ASTHMA    • Bipolar 2 disorder    • Bipolar disorder (HCC)     depression and anxiety   • Headache(784.0)    • Hypertension    • Migraine    • TOBI (obstructive sleep apnea)    • Pain 10/10/2017    umbilical pain   • Seizure (HCC) 1980    related to Ritalin     Past Surgical History:   Procedure Laterality Date   • BLOCK EPIDURAL STEROID INJECTION N/A 12/11/2020    Procedure: INJECTION, STEROID, EPIDURAL;  Surgeon: Roderick Best M.D.;  [facility];  Service: Pain Management   • UMBILICAL HERNIA REPAIR N/A 10/18/2017    Procedure: UMBILICAL HERNIA REPAIR;  Surgeon: Chinedu Kamara M.D.;  Location: SURGERY [facility];  Service: General        Current Outpatient Medications:   •  verapamil ER, 240 mg, Oral, DAILY  •  doxazosin, 1 mg, Oral, QHS  •  triamcinolone acetonide, Apply to affected area BID x7 days, Taking  •  cyclobenzaprine, 5-10 mg, Oral, TID PRN, Taking  •  acetaminophen, 1,000 mg, Oral, TID PRN, Taking  •  meclizine, 25 mg, Oral, TID PRN, Taking  •  buPROPion, 150 mg, Oral, BID, Taking  •  lamoTRIgine, TAKE 1 TABLET BY MOUTH ONCE DAILY IN THE MORNING, Taking  •  lithium CR, TAKE 1 TABLET BY MOUTH TWICE DAILY, Taking   Allergies   Allergen Reactions   • Ibuprofen Unspecified     Interaction with Lithium   • Latex Itching   • Naprosyn [Naproxen] Unspecified     Interaction with Lithium     Family History   Problem Relation Age of Onset   • Heart Disease Father 41        heart attack   • Heart Disease Paternal Aunt    • Heart Disease Paternal Uncle         CABGs    • Stroke Paternal Uncle         aneursym   • Heart Disease Paternal Grandfather    • Cancer Maternal Aunt    • Lung Cancer Maternal Aunt       Social History     Tobacco Use   • Smoking status: Former Smoker      "Types: Cigarettes     Quit date: 2006     Years since quitting: 15.1   • Smokeless tobacco: Never Used   • Tobacco comment: off and on from age [age]   Substance Use Topics   • Alcohol use: No   • Drug use: No     Review of Systems   Constitutional: Negative for chills, fever and malaise/fatigue.   Respiratory: Negative for cough, hemoptysis, shortness of breath and wheezing.    Cardiovascular: Positive for leg swelling (bilat since amlodipine, no prior DVTs). Negative for chest pain and palpitations.   Gastrointestinal: Negative for abdominal pain, diarrhea, nausea and vomiting.   Musculoskeletal: Negative for joint pain and myalgias.   Skin: Negative for itching and rash.   Neurological: Negative for dizziness, tremors, focal weakness, seizures, weakness and headaches.   Endo/Heme/Allergies: Does not bruise/bleed easily.      Objective:     Vitals:    21 1414 21 1418   BP: 131/84 146/79   BP Location: Left arm Left arm   Patient Position: Sitting Sitting   BP Cuff Size: Large adult Large adult   Pulse: 91 89   Weight: (!) 132 kg (290 lb)    Height: 1.88 m (6' 2\")       BP Readings from Last 5 Encounters:   21 146/79   21 140/78   21 148/90   21 136/63   20 135/80      Body mass index is 37.23 kg/m².  Physical Exam  Vitals reviewed.   Constitutional:       General: He is not in acute distress.     Appearance: He is well-developed. He is not diaphoretic.   HENT:      Head: Normocephalic and atraumatic.   Neck:      Thyroid: No thyromegaly.      Vascular: No JVD.   Cardiovascular:      Rate and Rhythm: Normal rate and regular rhythm.      Pulses: Normal pulses.      Heart sounds: No murmur.   Pulmonary:      Effort: No respiratory distress.      Breath sounds: Normal breath sounds. No wheezing or rales.   Musculoskeletal:         General: No swelling or tenderness.      Right lower le+ Edema present.      Left lower le+ Edema present.   Skin:     General: Skin is " warm and dry.   Neurological:      General: No focal deficit present.      Mental Status: He is oriented to person, place, and time.      Cranial Nerves: No cranial nerve deficit.      Coordination: Coordination normal.   Psychiatric:         Mood and Affect: Mood normal.         Behavior: Behavior normal.       Lab Results   Component Value Date    CHOLSTRLTOT 218 (H) 10/02/2020     (H) 10/02/2020    HDL 32 (A) 10/02/2020    TRIGLYCERIDE 199 (H) 10/02/2020           Lab Results   Component Value Date    HBA1C 5.5 10/02/2020      Lab Results   Component Value Date    SODIUM 137 11/05/2020    POTASSIUM 3.6 11/05/2020    CHLORIDE 102 11/05/2020    CO2 22 11/05/2020    GLUCOSE 78 11/05/2020    BUN 14 11/05/2020    CREATININE 1.03 11/05/2020    IFAFRICA >60 11/05/2020    IFNOTAFR >60 11/05/2020        Lab Results   Component Value Date    WBC 8.3 11/05/2020    RBC 5.67 11/05/2020    HEMOGLOBIN 15.8 11/05/2020    HEMATOCRIT 48.8 11/05/2020    MCV 86.1 11/05/2020    MCH 27.9 11/05/2020    MCHC 32.4 (L) 11/05/2020    MPV 9.9 11/05/2020      Ref. Range 10/2/2020 09:48   Lithium Latest Ref Range: 0.6 - 1.2 mmol/L 0.5 (L)     VASCULAR IMAGING:   Last EKG: No results found for this or any previous visit.     MRI brain 2017  1.  Left anterior frontal convexity 36 mm arachnoid cyst or leptomeningeal cyst. This is consistent with a long-standing finding as there is remodeling of the adjacent inner table of the skull.  2.  Otherwise, MRI of the brain without contrast within normal limits.    Medical Decision Making:  Today's Assessment / Status / Plan:     1. Dyslipidemia  Comp Metabolic Panel    CRP HIGH SENSITIVE (CARDIAC)    LipoFit by NMR    LIPOPROTEIN A    APOLIPOPROTEIN B   2. Essential hypertension  verapamil ER (CALAN-SR) 240 MG Tab CR    doxazosin (CARDURA) 1 MG Tab   3. Obesity (BMI 30-39.9)     4. Family history of early CAD     5. Obstructive sleep apnea     6. Bipolar 1 disorder, depressed, moderate (HCC)    "  7. Migraine without aura and without status migrainosus, not intractable     8. Celiac disease     9. Other specified personal risk factors, not elsewhere classified  CT-CARDIAC SCORING   10. Localized edema       Patient Type: Primary Prevention    Etiology of Established CVD if Present:   None     BLOOD PRESSURE MANAGEMENT:  ACC/AHA (2017) goal <130/80  Home BP at goal:  no  Office BP at goal:  no  Indications of end organ damage: None  Device candidate? no  Interactions can occur with RAAS blockers, diuretics including spirono with the risk of increasing Li levels and potential for nephrotoxicity.    Failed amlodipine due to edema - very symptomatic even at 5mg   At risk for edema based upon obesity, higher salt .  Plan:   Monitoring:   - start/continue home BP monitoring, reviewed correct technique, provide BP log and instructions  - order 24h ABPM:  NO  - monitor lytes/gfr routinely   - contact office if BP consistently >140/>90 to discussion of tx adjustments   Medications:  ACEi/ARB: avoid use for now due to Lithium, could use with caution and reduced Lithium dosing  DHP-CCB: stop amlodipine due to severe edema   Thiazide: avoid use for now due to Lithium, could use with caution and reduced Lithium dosing  Juliocesar-receptor Antagonist: avoid use for now due to Lithium, could use with caution and reduced Lithium dosing  Other:   Peripheral Alpha Blocker: start doxazosin 1mg QHS   Loop: avoid use for now due to Lithium, could use with caution and reduced Lithium dosing  BB: not indicated   non-DHP-CCB: start verapamil 240mg ER QHS   Centrally Acting Alpha Agonist: not indicated   Potassium-sparing diuretic: not indicated   DRI: not indicated    Direct Vasodilator: not indicated    ANTITHROMBOTIC THERAPY: not indicated    LIPID MANAGEMENT:   Qualifies for Statin Therapy Based on 2018 ACC/AHA Guidelines: no, Primary Prevention - 40-74yo, LDLc >70, <190 w/o DM  10-yr ASCVD risk score:  5 - 7.5% \"borderline " "risk\"  Major ASCVD events: None  High-risk conditions: Hypertension   Risk-enhancers: None  Currently on Statin: No  Very significant paternal lineage early CAD, father  41 from MI.    No indications of FH level LDL, need lp(a) eval  Treatment goals: LDL-C <100 (consider non-HDL-C <130, apoB <90)  At goal? No  Plan:   - reinforced ongoing TLC measures as noted   - update labs in 2mo  - check CAC score   Meds:   - low threshold to start at least moderate-intensity statin       Glycemic Status: Normal    Smoking:   reports that he quit smoking about 15 years ago. His smoking use included cigarettes. He has never used smokeless tobacco.   - continued complete avoidance of all tobacco products     Physical Activity: continue healthy activity to improve CV fitness, see care instructions for additional details     Weight Management and Nutrition: Dietary plan was discussed with patient at this visit including DASH, low sodium and/or as outlined in care instructions     Other:   1) bipolar, stable  - continue meds, defer to psychiatry for med mgmt  - use caution with meds Rx'd due to Lithium interactions     2) TOBI on CPAP  - continue adherent use to lower SBP and RV pressures     3) BLE edema, non-pitting subacute to chronic  Low prob DVT, hx contemporaneous to DHP-CCB use   Obesity and higher Na diet are contributory, possible noncompliance with CPAP   No prior cardiac or thyroid issues.  Renal function stable.   - stop amlodipine and try non-DHP CCB for BP with less edema potential   - start/continue knee-high compression socks, 20-30mmHg, as much as tolerated, reviewed [email or web address] and sizing processes   - increase walking, avoid prolonged standing   - elevated legs while sitting and sleeping above heart level  - reduce sodium (\"salt\") in diet to less than 2,000mg daily   - continue daily moisturizing lotion (such as Gold Bond Diabetic Foot Cream)    Instructed to follow-up with PCP for remainder of adult " medical needs: yes  We will partner with other providers in the management of established vascular disease and cardiometabolic risk factors.    Studies to Be Obtained: CAC scoring   Labs to Be Obtained: as noted above     Follow up in:  2 months with me, clyde Rehman M.D.  [facility]   [facility]   [phone]

## 2021-02-22 NOTE — PATIENT INSTRUCTIONS
"- check venous reflux duplex to eval for possible options for interventions   - start/continue knee-high compression socks, 20-30mmHg, as much as tolerated, reviewed [email or web address] and sizing processes   - increase walking, avoid prolonged standing   - elevated legs while sitting and sleeping above heart level  - reduce sodium (\"salt\") in diet to less than 2,000mg daily   - continue daily moisturizing lotion (such as Gold Bond Diabetic Foot Cream)  Phlebotonics:  - consider horse chestnut seed extract 300mg 2 times daily  - start vasculera, reviewed ADRs, costs - Rx sent to [facility] specialty pharmacy         Helpful links:     Https://www.nhlbi.nih.gov/files/docs/public/heart/dash_brief.pdf    Https://www.nhlbi.nih.gov/files/docs/public/heart/new_dash.pdf    http://www.[employer]/consumers/ho/nut.dash.diet.pdf    Free 7-day menu download: https://[employer]/DASH-Diet-Meal-Plan-Menu/dp/O99C8Z66SX      - continue all current medications, see updated medication list for changes     BLOOD PRESSURE:  - if you notice BP > 140/>90 for more than 3 days, then contact our office ([facility], [phone]) for further instructions    If you are being treated for blood pressure today: please be advised that stabilizing BP may require multiple med changes and close monitoring over the next several months and initially there may be additional imaging and labs and the possibility of adverse drug reactions. Variability of your blood pressure and heart rate may occur until we have things stabilized.  Please feel free to contact our office as needed for questions or concerns.        SODIUM RESTRICTIONS:   - consume no more than 2,300mg of sodium daily (look at food labels) ,or if you have high BP then reduce to no more than 1,500mg of sodium daily   For more information visit: https://www.[employer]/contents/low-sodium-diet-the-basics/print?zuzmtCmt=6836&source=see_link         DASH DIET:  Overview: " (https://www.heart.org/en/health-topics/high-blood-pressure/changes-you-can-make-to-manage-high-blood-pressure/managing-blood-pressure-with-a-heart-healthy-diet)  Tips for shopping:  https://www.[facility].org/healthy-lifestyle/nutrition-and-healthy-eating/in-depth/dash-diet/art-77832074?p=1   DASH is a flexible and balanced eating plan that helps create a heart-healthy eating style for life.  The DASH eating plan requires no special foods and instead provides daily and weekly nutritional goals. This plan recommends:  · Eating vegetables, fruits, and whole grains  · Including fat-free or low-fat dairy products, fish, poultry, beans, nuts, and vegetable oils  · Limiting foods that are high in saturated fat, such as fatty meats, full-fat dairy products, and tropical oils such as coconut, palm kernel, and palm oils  · Limiting sugar-sweetened beverages and sweets.  Based on these recommendations, the following table shows examples of daily and weekly servings that meet DASH eating plan targets for a 2,379-toosqjc-i-day diet.    CHOLESTEROL-REDUCING DIETS:  1) AHA diet  (http://www.heart.org/en/healthy-living/healthy-eating/eat-smart/nutrition-basics/aha-diet-and-lifestyle-recommendations)   2) Mediterranean diet (http://www.heart.org/en/healthy-living/healthy-eating/eat-smart/nutrition-basics/mediterranean-diet)   3) Lowering triglycerides: (http://my.americanheart.org/idc/groups/ahamah-public/@[facility]/@sop/@[facility]/documents/downloadable/[facility]_425988.pdf)     PHYSICAL ACTIVITY:  Unless directed otherwise at today's visit or you are physically incapable due to your current health or medical conditions, it is advised per the American Heart Association to engage in moderate to vigorous physical activity such as brisk walking, swimming, cycling, >150 minutes per week at 30-40 minutes per session, 3 to 5 times weekly.      GENERAL HEALTHY DIET ADVICE:  - the USDA food pattern (https://www.cnpp.usda.gov/USDAFoodPatterns)  - plate  method (https://www.choosemyplate.gov/)  - consume diet that emphasizes intake of vegetables, fruits, and whole grains,  - use low fat diary products, poultry, fish, legumes, nontropical vegetable oils, nuts  - limit intake of sweets, sugar-sweetned beverages, and red meats   - reduce saturated and trans fats to <6% of your daily calories

## 2021-02-23 ENCOUNTER — OFFICE VISIT (OUTPATIENT)
Dept: PHYSICAL MEDICINE AND REHAB | Facility: MEDICAL CENTER | Age: 46
End: 2021-02-23
Payer: COMMERCIAL

## 2021-02-23 VITALS
SYSTOLIC BLOOD PRESSURE: 140 MMHG | BODY MASS INDEX: 37.12 KG/M2 | RESPIRATION RATE: 18 BRPM | WEIGHT: 298.5 LBS | TEMPERATURE: 99.6 F | OXYGEN SATURATION: 96 % | HEART RATE: 90 BPM | HEIGHT: 75 IN | DIASTOLIC BLOOD PRESSURE: 78 MMHG

## 2021-02-23 DIAGNOSIS — M54.12 CERVICAL RADICULOPATHY: ICD-10-CM

## 2021-02-23 DIAGNOSIS — G89.29 CHRONIC RIGHT SHOULDER PAIN: ICD-10-CM

## 2021-02-23 DIAGNOSIS — R20.2 NUMBNESS AND TINGLING OF RIGHT HAND: ICD-10-CM

## 2021-02-23 DIAGNOSIS — M25.511 CHRONIC RIGHT SHOULDER PAIN: ICD-10-CM

## 2021-02-23 DIAGNOSIS — R20.0 NUMBNESS AND TINGLING OF RIGHT HAND: ICD-10-CM

## 2021-02-23 DIAGNOSIS — G56.01 RIGHT CARPAL TUNNEL SYNDROME: ICD-10-CM

## 2021-02-23 DIAGNOSIS — M79.2 NEURALGIA: ICD-10-CM

## 2021-02-23 DIAGNOSIS — M54.2 NECK PAIN: ICD-10-CM

## 2021-02-23 PROCEDURE — 95912 NRV CNDJ TEST 11-12 STUDIES: CPT | Performed by: PHYSICAL MEDICINE & REHABILITATION

## 2021-02-23 PROCEDURE — 95886 MUSC TEST DONE W/N TEST COMP: CPT | Performed by: PHYSICAL MEDICINE & REHABILITATION

## 2021-02-23 PROCEDURE — 20526 THER INJECTION CARP TUNNEL: CPT | Mod: RT | Performed by: PHYSICAL MEDICINE & REHABILITATION

## 2021-02-23 PROCEDURE — 76942 ECHO GUIDE FOR BIOPSY: CPT | Performed by: PHYSICAL MEDICINE & REHABILITATION

## 2021-02-23 RX ORDER — DEXAMETHASONE SODIUM PHOSPHATE 4 MG/ML
4 INJECTION, SOLUTION INTRA-ARTICULAR; INTRALESIONAL; INTRAMUSCULAR; INTRAVENOUS; SOFT TISSUE ONCE
Status: COMPLETED | OUTPATIENT
Start: 2021-02-23 | End: 2021-02-23

## 2021-02-23 RX ADMIN — DEXAMETHASONE SODIUM PHOSPHATE 4 MG: 4 INJECTION, SOLUTION INTRA-ARTICULAR; INTRALESIONAL; INTRAMUSCULAR; INTRAVENOUS; SOFT TISSUE at 16:32

## 2021-02-23 ASSESSMENT — PATIENT HEALTH QUESTIONNAIRE - PHQ9: CLINICAL INTERPRETATION OF PHQ2 SCORE: 0

## 2021-02-23 ASSESSMENT — PAIN SCALES - GENERAL: PAINLEVEL: 2=MINIMAL-SLIGHT

## 2021-02-23 ASSESSMENT — FIBROSIS 4 INDEX: FIB4 SCORE: 0.33

## 2021-02-24 NOTE — PROGRESS NOTES
See separate procedure note    I recommend the patient staying off work for 2 weeks and then after that okay to return to work.

## 2021-02-24 NOTE — PROCEDURES
Date of Service: 2/23/2021    Patient: Chaitanya Kelly 45 y.o. male MRN: [id number]     Physician/s: Roderick Best MD    Pre-operative Diagnosis: RIGHT  carpal tunnel syndrome    Post-operative Diagnosis: RIGHT  carpal tunnel syndrome      Procedure: RIGHT  ultrasound-guided carpal tunnel injection    Description of procedure:    The risks, benefits, and alternatives of the procedure were reviewed and discussed with the patient.  Written informed consent was freely obtained. A pre-procedural time-out was conducted by the physician verifying patient’s identity, procedure to be performed, procedure site and side, and allergy verification. Appropriate equipment was determined to be in place for the procedure.     No sedation was used for this procedure.     In the office suite the patient was placed in a sitting position, and his RIGHT   hand/s were rested with the palm/s up on a sterile santoyo stand, and the skin was prepped and draped in the usual sterile fashion. Median nerve at the carpal tunnel was identified under ultrasound guidance. Also identified where the ulnar nerve, ulnar artery, radial artery to confirm the needle path would not be impacting the structures. Under ultrasound guidance with an in plane approach,  A 27g 1.5 inch needle was placed into skin and advanced adjacent so the needle path was deep to the median nerve. Following negative aspiration, a total of 1.5mL solution mixture was injected perineurally, containing approx 1mL of 1% Lidocaine with 0.5mL of 4mg/mL dexamethasone, 1mL of sterile saline. . The patient's skin was wiped with a 4x4 gauze, the area was cleansed with alcohol prep, and a bandaid was applied. There were no complications noted.     The images were uploaded to our secure system for permanent storage.         Roderick Best MD  Physical Medicine and Rehabilitation  Interventional Spine and Sports Physiatry  [facility]

## 2021-02-24 NOTE — PROCEDURES
"  [facility]  Sports and Spine, Physiatry, EMG  [facility]  [address]  [place] [id number]    Test Date:  2021    Patient: Chaitanya Kelly : 1975 Physician: Roderick Best M.D.   Sex: Male Height: 6' \" Ref Phys: Roderick Best MD   ID#: [id number] Weight: 298.5 lbs. Technician: Roderick Best     Patient Complaints:  Right-sided neck pain, right arm pain see previous notes of Dr. Best for details    Medications:  Reviewed on epic    Patient History / Exam:  Temperature 97.8 degrees at the right carpal tunnel.  Phalen's maneuver positive on the right.  See previous notes of Dr. Best for more details    NCV & EMG Findings:  The right median/radial (dig I) comparison nerve showed abnormal peak latency difference (Median-Radial, 0.9 ms).  The right median/ulnar (dig IV) comparison nerve showed prolonged distal peak latency (Median Wr, 3.4 ms).  The right median/ulnar (palm) comparison nerve showed abnormal peak latency difference (Median Palm-Ulnar Palm, 0.5 ms).  All remaining nerves (as indicated in the following tables) were within normal limits.      Needle evaluation of the right triceps and the right extensor carpi radialis brevis muscles showed increased insertional activity and slightly increased spontaneous activity.  All remaining muscles (as indicated in the following table) showed no evidence of electrical instability.      Impression:  Abnormal study  There is electrodiagnostic evidence of a sensorimotor median mononeuropathy at the wrist consistent with right carpal tunnel syndrome.     There was 1+ fibs and sharps in the triceps and extensor carpi radialis which can be seen with the C6 or C7 cervical radiculopathy but is not diagnostic for this on its own.    Today's exam points against ulnar neuropathy     Recommendations:  Given these results we decided to proceed with a carpal tunnel injection today.  The patient has been using his neutral wrist " splint.  We discussed a surgical referral for his carpal tunnel syndrome but he wishes to hold off on this for now to see how he responds to the carpal tunnel injection.    ___________________________  Roderick Best M.D.        Nerve Conduction Studies  Anti Sensory Summary Table     Stim Site NR Peak (ms) Norm Peak (ms) P-T Amp (µV) Norm P-T Amp Site1 Site2 Delta-P (ms) Dist (cm) Favian (m/s) Norm Favian (m/s)   Right Median Anti Sensory (2nd Digit)   Wrist    3.5 <3.6 16.4 >10 Wrist 2nd Digit 3.5 14.0 40 >39   Right Radial Anti Sensory (Base 1st Digit)   Wrist    2.0 <3.1 37.6  Wrist Base 1st Digit 2.0 0.0     Right Ulnar Anti Sensory (5th Digit)   Wrist    3.3 <3.7 26.6 >15.0 Wrist 5th Digit 3.3 14.0 42 >38     Motor Summary Table     Stim Site NR Onset (ms) Norm Onset (ms) O-P Amp (mV) Norm O-P Amp Site1 Site2 Delta-0 (ms) Dist (cm) Favian (m/s) Norm Favian (m/s)   Right Median Motor (Abd Poll Brev)   Wrist    3.7 <4.2 4.7 >5 Elbow Wrist 5.7 35.0 61 >50   Elbow    9.4  4.4          Right Ulnar Motor (Abd Dig Min)   Wrist    3.1 <4.2 7.8 >3           Comparison Summary Table     Stim Site NR Peak (ms) Norm Peak (ms) P-T Amp (µV) Site1 Site2 Delta-P (ms) Norm Delta (ms)   Right Median/Radial Dig I Comparison (Digit 1 - 10cm)   Median    2.8 <2.9 33.8 Median Radial 0.9 <0.4   Radial    1.9 <2.8 13.1           0.8  235.1       Right Median/Ulnar Dig IV Comparison (Digit 4 - 14cm)   Median Wr    3.4 <3.3 13.2 Median Wr Ulnar Wr 0.4 <0.4   Ulnar Wr    3.0 <3.3 24.3       Right Median/Ulnar Palm Comparison (Wrist - 8cm)   Median Palm    2.3 <2.5 34.4 Median Palm Ulnar Palm 0.5 <0.3   Ulnar Palm    1.8 <2.5 7.7         EMG+     Side Muscle Nerve Root Ins Act Fibs Psw Amp Dur Poly Recrt Int Pat Other Comment   Right Deltoid Axillary C5-6 Nml Nml Nml Nml Nml 0 Nml Nml None    Right Biceps Musculocut C5-6 Nml Nml Nml Nml Nml 0 Nml Nml None    Right Triceps Radial C6-7-8 Incr 1+ 1+ Nml Nml 0 Nml Nml None    Right ExtCarRadBrev Radial  C6-7 Incr 1+ 1+ Nml Nml 0 Nml Nml None    Right PronatorTeres Median C6-7 Nml Nml Nml Nml Nml 0 Nml Nml None    Right 1stDorInt Ulnar C8-T1 Nml Nml Nml Nml Nml 0 Nml Nml None            Waveforms:

## 2021-02-25 ENCOUNTER — PATIENT MESSAGE (OUTPATIENT)
Dept: PHYSICAL MEDICINE AND REHAB | Facility: MEDICAL CENTER | Age: 46
End: 2021-02-25

## 2021-02-25 DIAGNOSIS — G56.01 RIGHT CARPAL TUNNEL SYNDROME: ICD-10-CM

## 2021-02-25 DIAGNOSIS — M54.12 CERVICAL RADICULOPATHY: ICD-10-CM

## 2021-02-25 DIAGNOSIS — M79.2 NEURALGIA: ICD-10-CM

## 2021-02-25 NOTE — TELEPHONE ENCOUNTER
OK to continue physical therapy.      It is common for a delay of the pain relief after this type of an injection and that delay can be a few days and sometimes a few weeks.  I do expect this to improve and I will see you at the follow-up visit for this.Dr. Best

## 2021-03-04 RX ORDER — GABAPENTIN 100 MG/1
100 CAPSULE ORAL 3 TIMES DAILY PRN
Qty: 90 CAPSULE | Refills: 5 | Status: SHIPPED | OUTPATIENT
Start: 2021-03-04 | End: 2021-05-10

## 2021-03-04 NOTE — PROGRESS NOTES
We are still quite early after the carpal tunnel injection and I am still hopeful that this will improve.  This may be improving over the next few weeks.  In the meantime I will prescribe gabapentin as needed.      We will move up your follow-up visit to follow-up in clinic in approximately 2 weeks

## 2021-03-07 ENCOUNTER — HOSPITAL ENCOUNTER (OUTPATIENT)
Dept: RADIOLOGY | Facility: MEDICAL CENTER | Age: 46
End: 2021-03-07
Attending: FAMILY MEDICINE
Payer: COMMERCIAL

## 2021-03-07 DIAGNOSIS — Z91.89 OTHER SPECIFIED PERSONAL RISK FACTORS, NOT ELSEWHERE CLASSIFIED: ICD-10-CM

## 2021-03-07 PROCEDURE — 4410556 CT-CARDIAC SCORING (SELF PAY ONLY)

## 2021-03-08 ENCOUNTER — PATIENT MESSAGE (OUTPATIENT)
Dept: VASCULAR LAB | Facility: MEDICAL CENTER | Age: 46
End: 2021-03-08

## 2021-03-08 NOTE — PROGRESS NOTES
Spoke with the pt regarding his BP > 140/80. He has not been off Amlodipine long enough to have it completely out of his system but his legs are still swollen. He will increase the doxazosin to 2 mg and will get back to me next week to let me know if this has improved his BP. If it does we will need to increase the medication quantity so that he has enough for the 90 supply.  GISELE Grissom.

## 2021-03-10 ENCOUNTER — OFFICE VISIT (OUTPATIENT)
Dept: MEDICAL GROUP | Facility: LAB | Age: 46
End: 2021-03-10
Payer: COMMERCIAL

## 2021-03-10 VITALS
OXYGEN SATURATION: 97 % | DIASTOLIC BLOOD PRESSURE: 88 MMHG | BODY MASS INDEX: 37.3 KG/M2 | RESPIRATION RATE: 13 BRPM | SYSTOLIC BLOOD PRESSURE: 146 MMHG | WEIGHT: 300 LBS | HEIGHT: 75 IN | TEMPERATURE: 98.5 F | HEART RATE: 94 BPM

## 2021-03-10 DIAGNOSIS — G56.01 CARPAL TUNNEL SYNDROME OF RIGHT WRIST: ICD-10-CM

## 2021-03-10 DIAGNOSIS — R60.9 EDEMA, UNSPECIFIED TYPE: ICD-10-CM

## 2021-03-10 PROCEDURE — 99213 OFFICE O/P EST LOW 20 MIN: CPT | Performed by: FAMILY MEDICINE

## 2021-03-10 ASSESSMENT — ENCOUNTER SYMPTOMS
NERVOUS/ANXIOUS: 0
WHEEZING: 0
FEVER: 0
CHILLS: 0
DIZZINESS: 0
CONSTIPATION: 0
ABDOMINAL PAIN: 0
SHORTNESS OF BREATH: 0
DEPRESSION: 0
NAUSEA: 0
HEADACHES: 0
BLURRED VISION: 0
VOMITING: 0
DIARRHEA: 0
PALPITATIONS: 0

## 2021-03-10 ASSESSMENT — FIBROSIS 4 INDEX: FIB4 SCORE: 0.33

## 2021-03-10 NOTE — PROGRESS NOTES
"Subjective:   Chaitanya Kelly is a 45 y.o. male here today for   Chief Complaint   Patient presents with   • Paperwork     Follow up from [facility]    • Hypertension Follow-up     #Carpal tunnel syndrome:  -Patient been followed by Dr. Best   Status post wrist injections.  He states that it has improved slightly; ever, the \"nerve pain\" continues to be excruciating causing him to continue to miss work.  He does have [facility] paperwork up through next week although is concerned that more time will be needed especially if Dr. Best recommends surgical correction.  Overall though he is very hopeful for treatment and has no other concerns at this time.      #Leg edema:  -Patient been on amlodipine for treatment of hypertension; ever, was causing significant lower extremity edema.  Patient was following up with vascular medicine.  After we discontinue the amlodipine patient started on Cardura, verapamil which patient states has been working well keeping blood pressure is low.  He is asymptomatic at this time and has no concerns.    Allergies   Allergen Reactions   • Ibuprofen Unspecified     Interaction with Lithium   • Latex Itching   • Naprosyn [Naproxen] Unspecified     Interaction with Lithium         Current medicines (including changes today)  Current Outpatient Medications   Medication Sig Dispense Refill   • doxazosin (CARDURA) 1 MG Tab Take 1 tablet by mouth every bedtime for 360 days. 90 tablet 3   • gabapentin (NEURONTIN) 100 MG Cap Take 1 capsule by mouth 3 times a day as needed (pain). 90 capsule 5   • verapamil ER (CALAN-SR) 240 MG Tab CR Take 1 tablet by mouth every day for 360 days. (Patient not taking: Reported on 2/23/2021) 90 tablet 3   • triamcinolone acetonide (KENALOG) 0.1 % Cream Apply to affected area BID x7 days (Patient not taking: Reported on 2/23/2021) 80 g 0   • cyclobenzaprine (FLEXERIL) 10 mg Tab Take 0.5-1 Tabs by mouth 3 times a day as needed for Mild Pain, Moderate Pain or Muscle Spasms. " "90 Tab 3   • acetaminophen (TYLENOL) 500 MG Tab Take 2 Tabs by mouth 3 times a day as needed (pain.  Do not exceed 3000 mg of total acetaminophen per day). 180 Tab 6   • meclizine (ANTIVERT) 25 MG Tab Take 1 Tab by mouth 3 times a day as needed. 30 Tab 3   • buPROPion (WELLBUTRIN XL) 150 MG XL tablet Take 1 Tab by mouth 2 Times a Day. 90 Tab 0   • lamoTRIgine (LAMICTAL) 100 MG Tab TAKE 1 TABLET BY MOUTH ONCE DAILY IN THE MORNING 90 Tab 1   • lithium CR (ESKALITH CR) 450 MG Tab CR TAKE 1 TABLET BY MOUTH TWICE DAILY 180 Tab 1     No current facility-administered medications for this visit.     He  has a past medical history of Anxiety, ASTHMA, Bipolar 2 disorder, Bipolar disorder (HCC), Headache(784.0), Hypertension, Migraine, TOBI (obstructive sleep apnea), Pain (10/10/2017), and Seizure ([facility]) (1980).    ROS   Review of Systems   Constitutional: Negative for chills and fever.   HENT: Negative for hearing loss.    Eyes: Negative for blurred vision.   Respiratory: Negative for shortness of breath and wheezing.    Cardiovascular: Negative for chest pain and palpitations.   Gastrointestinal: Negative for abdominal pain, constipation, diarrhea, nausea and vomiting.   Neurological: Negative for dizziness and headaches.   Psychiatric/Behavioral: Negative for depression. The patient is not nervous/anxious.       Objective:     Physical Exam:  /88   Pulse 94   Temp 36.9 °C (98.5 °F) (Temporal)   Resp 13   Ht 1.905 m (6' 3\")   Wt (!) 136 kg (300 lb)   SpO2 97%  Body mass index is 37.5 kg/m².   Constitutional: Alert, no distress.  Skin: Warm, dry, good turgor, no rashes in visible areas.  Eye: Equal, round and reactive, conjunctiva clear, lids normal.  ENMT: TM's clear bilaterally, lips without lesions, good dentition, oropharynx clear.  Neck: Trachea midline, no masses, no thyromegaly. No cervical or supraclavicular lymphadenopathy.  Respiratory: Unlabored respiratory effort, lungs clear to auscultation, no wheezes, " no rhonchi.  Cardiovascular: Normal S1, S2, no murmur, no edema.  Abdomen: Soft, non-tender, no masses, no hepatosplenomegaly.  Psych: Alert and oriented x3, normal affect and mood.    Const: Vitals above. Well-appearing.  CV: Inspected/palpated for upper extremity edema. Bilateral radial pulses palpated, noting below if not 2+. Capillary refill evaluated distally, noting below if greater than 2 seconds.  Skin: Inspected for rash or lesions in area of concern.  Neuro/psych: Reflexes at brachioradialis (C5/6), biceps (C5/6), triceps (C7/8): 2+. 2-point discrimination assessed at 2nd finger (C6, median nerve), 3rd finger (C7, median nerve), 5th finger (C8, ulnar nerve), and dorsal web space between 1st/2nd digit (radial nerve). Phalen, Tinel's tests: negative. Observed for normal mood/affect/insight.  MSK: normal gait. Posture: unremarkable. Examination of bilateral wrist/hand:  - Insepected/palpated bilaterally for warmth, upper extremity carriage, ulnar variance, and for deformity and tenderness of the proximal/distal radius and ulna, scaphoid/snuffbox, carpals, metacarpals, phalanges, carpal/CMC/MCP/IP joints, and TFCC.  - Assessed passive/active range of motion bilaterally with forearm pronation/supination, wrist flexion/extension, wrist radial/ulnar deviation, MCP flexion/extension/abduction/adduction, IP joint flexion/extension, and thumb flexion/extension/abduction/adduction/opposition, noting below for any pain or crepitation.  - Assessed muscle strength bilaterally with wrist flexion (C6/7, median/ulnar nerves), wrist extension (C7/8 radial nerve), finger extension (C7, radial nerve), finger abduction (T1, ulnar nerve), and thumb opposition (median nerve), noting below if less than 5/5 or pain. Observed for muscle atrophy in thenar, hypothenar, and interosseus areas.  - Assessed stability bilaterally at the TFCC (piano key test) and wrist, carpal, scapho-lunate (Shuck test), metacarpal, and phalangeal joints.  Varus/valgus stress at MCP/IP joints: unremarkable. Finkelstein test negative.    All of the above were found to be normal      Assessment and Plan:     1. Carpal tunnel syndrome of right wrist  -Status: stable we will continue to follow-up with physiatry for further evaluation and treatment.  If any further FMLA paperwork is required he will follow-up with his PCP.    2. Edema, unspecified type  -Status: Resolved.  He now is on different blood pressure medication there is no more edema on physical exam.  Blood pressure is slightly above goal but he will continue to work with vascular medicine to bring this down.  Follow-up with PCP as needed.      Followup: Return if symptoms worsen or fail to improve.         PLEASE NOTE: This dictation was created using voice recognition software. I have made every reasonable attempt to correct obvious errors, but I expect that there are errors of grammar and possibly content that I did not discover before finalizing the note.

## 2021-03-15 ENCOUNTER — PATIENT MESSAGE (OUTPATIENT)
Dept: PHYSICAL MEDICINE AND REHAB | Facility: MEDICAL CENTER | Age: 46
End: 2021-03-15

## 2021-03-15 ENCOUNTER — TELEPHONE (OUTPATIENT)
Dept: MEDICAL GROUP | Facility: LAB | Age: 46
End: 2021-03-15

## 2021-03-15 DIAGNOSIS — M54.12 CERVICAL RADICULOPATHY: ICD-10-CM

## 2021-03-15 DIAGNOSIS — G56.01 RIGHT CARPAL TUNNEL SYNDROME: ICD-10-CM

## 2021-03-26 ENCOUNTER — HOSPITAL ENCOUNTER (OUTPATIENT)
Facility: MEDICAL CENTER | Age: 46
End: 2021-03-26
Attending: NEUROLOGICAL SURGERY | Admitting: NEUROLOGICAL SURGERY
Payer: COMMERCIAL

## 2021-03-26 DIAGNOSIS — E66.9 OBESITY (BMI 30-39.9): ICD-10-CM

## 2021-03-26 DIAGNOSIS — R19.00 ABDOMINAL WALL BULGE: ICD-10-CM

## 2021-03-26 DIAGNOSIS — E55.9 VITAMIN D DEFICIENCY: ICD-10-CM

## 2021-03-26 DIAGNOSIS — K42.9 UMBILICAL HERNIA WITHOUT OBSTRUCTION OR GANGRENE: ICD-10-CM

## 2021-03-26 DIAGNOSIS — R23.9 SKIN CHANGE: ICD-10-CM

## 2021-03-26 DIAGNOSIS — Z01.810 PRE-OPERATIVE CARDIOVASCULAR EXAMINATION: ICD-10-CM

## 2021-03-26 DIAGNOSIS — L60.0 INGROWN TOENAIL OF BOTH FEET: ICD-10-CM

## 2021-03-26 DIAGNOSIS — G43.009 MIGRAINE WITHOUT AURA AND WITHOUT STATUS MIGRAINOSUS, NOT INTRACTABLE: ICD-10-CM

## 2021-03-26 DIAGNOSIS — K90.0 CELIAC DISEASE: ICD-10-CM

## 2021-03-26 DIAGNOSIS — E78.5 DYSLIPIDEMIA: ICD-10-CM

## 2021-03-26 DIAGNOSIS — Z01.812 PRE-OPERATIVE LABORATORY EXAMINATION: ICD-10-CM

## 2021-03-26 DIAGNOSIS — R42 VERTIGO: ICD-10-CM

## 2021-03-26 DIAGNOSIS — R53.82 CHRONIC FATIGUE: ICD-10-CM

## 2021-03-26 DIAGNOSIS — G47.33 OBSTRUCTIVE SLEEP APNEA: ICD-10-CM

## 2021-03-26 DIAGNOSIS — Z82.49 FAMILY HISTORY OF EARLY CAD: ICD-10-CM

## 2021-03-26 DIAGNOSIS — F31.32 BIPOLAR 1 DISORDER, DEPRESSED, MODERATE (HCC): ICD-10-CM

## 2021-03-26 DIAGNOSIS — M54.2 NECK PAIN: ICD-10-CM

## 2021-03-26 DIAGNOSIS — I10 ESSENTIAL HYPERTENSION: ICD-10-CM

## 2021-03-26 DIAGNOSIS — J45.20 MILD INTERMITTENT ASTHMA WITHOUT COMPLICATION: ICD-10-CM

## 2021-04-01 ENCOUNTER — OFFICE VISIT (OUTPATIENT)
Dept: MEDICAL GROUP | Facility: LAB | Age: 46
End: 2021-04-01
Payer: COMMERCIAL

## 2021-04-01 VITALS
OXYGEN SATURATION: 98 % | HEIGHT: 75 IN | DIASTOLIC BLOOD PRESSURE: 95 MMHG | WEIGHT: 300 LBS | TEMPERATURE: 98.6 F | RESPIRATION RATE: 18 BRPM | BODY MASS INDEX: 37.3 KG/M2 | HEART RATE: 77 BPM | SYSTOLIC BLOOD PRESSURE: 150 MMHG

## 2021-04-01 DIAGNOSIS — G47.33 OSA (OBSTRUCTIVE SLEEP APNEA): Primary | ICD-10-CM

## 2021-04-01 DIAGNOSIS — M54.2 NECK PAIN: ICD-10-CM

## 2021-04-01 DIAGNOSIS — I10 ESSENTIAL HYPERTENSION: ICD-10-CM

## 2021-04-01 PROCEDURE — 99214 OFFICE O/P EST MOD 30 MIN: CPT | Performed by: PHYSICIAN ASSISTANT

## 2021-04-01 ASSESSMENT — FIBROSIS 4 INDEX: FIB4 SCORE: 0.33

## 2021-04-01 NOTE — PROGRESS NOTES
Subjective:   Chaitanya Kelly is a 45 y.o. male here today for BP check, chronic neck pain.     Essential hypertension  Follow up; Pt continues to have elevated BP today.   Regimen includes Cardura and Verapamil. Medication regimen is limited due to use of Lithium.   Leg edema is better since d/c'ing amlodipine.     Continues care with Dr. Rehman.     Pt does use a CPAP, however, it has not been titrated in a while.     Neck pain  Follow up; pt to have surgery 04/07/2021 with Dr. Neves.   Pt is very nervous for procedure which also may be adding to increased blood pressure readings today.      Current medicines (including changes today)  Current Outpatient Medications   Medication Sig Dispense Refill   • gabapentin (NEURONTIN) 100 MG Cap Take 1 capsule by mouth 3 times a day as needed (pain). 90 capsule 5   • doxazosin (CARDURA) 1 MG Tab Take 1 tablet by mouth every bedtime for 360 days. 90 tablet 3   • cyclobenzaprine (FLEXERIL) 10 mg Tab Take 0.5-1 Tabs by mouth 3 times a day as needed for Mild Pain, Moderate Pain or Muscle Spasms. 90 Tab 3   • acetaminophen (TYLENOL) 500 MG Tab Take 2 Tabs by mouth 3 times a day as needed (pain.  Do not exceed 3000 mg of total acetaminophen per day). 180 Tab 6   • meclizine (ANTIVERT) 25 MG Tab Take 1 Tab by mouth 3 times a day as needed. 30 Tab 3   • buPROPion (WELLBUTRIN XL) 150 MG XL tablet Take 1 Tab by mouth 2 Times a Day. 90 Tab 0   • lamoTRIgine (LAMICTAL) 100 MG Tab TAKE 1 TABLET BY MOUTH ONCE DAILY IN THE MORNING 90 Tab 1   • lithium CR (ESKALITH CR) 450 MG Tab CR TAKE 1 TABLET BY MOUTH TWICE DAILY 180 Tab 1   • verapamil ER (CALAN-SR) 240 MG Tab CR Take 1 tablet by mouth every day for 360 days. (Patient not taking: Reported on 2/23/2021) 90 tablet 3     No current facility-administered medications for this visit.     He  has a past medical history of Anxiety, ASTHMA, Bipolar 2 disorder, Bipolar disorder (HCC), Headache(784.0), Hypertension, Migraine, TOBI  "(obstructive sleep apnea), Pain (10/10/2017), and Seizure (HCC) (1980).    ROS   No chest pain, no shortness of breath, no abdominal pain       Objective:     /95   Pulse 77   Temp 37 °C (98.6 °F) (Temporal)   Resp 18   Ht 1.905 m (6' 3\")   Wt (!) 136 kg (300 lb)   SpO2 98%  Body mass index is 37.5 kg/m².   Physical Exam:  Constitutional: Alert, no distress.  Skin: Warm, dry, good turgor, no rashes in visible areas.  Eye: Equal, round; conjunctiva clear, lids normal.  Neck: Trachea midline  Respiratory: Unlabored respiratory effort, lungs clear to auscultation, no wheezes, no ronchi.  Cardiovascular: Normal S1, S2, no murmur, no edema.  Psych: Alert and oriented x3, normal affect and mood.    Assessment and Plan:   The following treatment plan was discussed    1. TOBI (obstructive sleep apnea)  Follow up; chronic  Will send new referral to sleep medicine as his CPAP has not been titrated in several years. Likely adding to uncontrolled HTN.   Follow up w/ me as needed for this.   - REFERRAL TO PULMONARY AND SLEEP MEDICINE    2. Essential hypertension  Follow up; continues to be uncontrolled.   Regimen is limited due to use of Lithium.  Hopefully will improve with correct CPAP titration.   Pt is also very anxious about upcoming surgery.   He is to f/u with Dr. Rehman 04/23/2021.   - REFERRAL TO PULMONARY AND SLEEP MEDICINE    3. Neck pain  Follow up; Pt is to have surgery on 04/07/2021 with Dr. Ellsworth.     My total time spent caring for the patient on the day of the encounter was 35 minutes.   This does not include time spent on separately billable procedures/tests.      Followup: Return in about 5 weeks (around 5/6/2021), or if symptoms worsen or fail to improve.       Cesilia Dotson P.A.-C.  Supervising MD: Dr. Reza Chin MD  04/01/21      "

## 2021-04-01 NOTE — LETTER
April 1, 2021    To Whom It May Concern:         This is confirmation that Chaitanya BRANHAM Ceciliorafia attended his scheduled appointment with Cesilia Dotson P.A.-C. on 4/01/21. Based on our discussion and examination today, I recommend that he return to work on 05/22/2021 with restrictions of lifting no more than 10lbs.          If you have any questions please do not hesitate to call me at the phone number listed below.    Sincerely,          Cesilia Dotson P.A.-C.  [phone]

## 2021-04-01 NOTE — ASSESSMENT & PLAN NOTE
Follow up; pt to have surgery 04/07/2021 with Dr. Neves.   Pt is very nervous for procedure which also may be adding to increased blood pressure readings today.

## 2021-04-01 NOTE — ASSESSMENT & PLAN NOTE
Follow up; Pt continues to have elevated BP today.   Regimen includes Cardura and Verapamil. Medication regimen is limited due to use of Lithium.   Leg edema is better since d/c'ing amlodipine.     Continues care with Dr. Rehman.     Pt does use a CPAP, however, it has not been titrated in a while.

## 2021-04-02 ENCOUNTER — HOSPITAL ENCOUNTER (OUTPATIENT)
Dept: RADIOLOGY | Facility: MEDICAL CENTER | Age: 46
End: 2021-04-02
Attending: PHYSICIAN ASSISTANT | Admitting: NEUROLOGICAL SURGERY
Payer: COMMERCIAL

## 2021-04-02 ENCOUNTER — HOSPITAL ENCOUNTER (OUTPATIENT)
Dept: RADIOLOGY | Facility: MEDICAL CENTER | Age: 46
End: 2021-04-02
Attending: NEUROLOGICAL SURGERY | Admitting: NEUROLOGICAL SURGERY
Payer: COMMERCIAL

## 2021-04-02 ENCOUNTER — PRE-ADMISSION TESTING (OUTPATIENT)
Dept: ADMISSIONS | Facility: MEDICAL CENTER | Age: 46
End: 2021-04-02
Attending: NEUROLOGICAL SURGERY
Payer: COMMERCIAL

## 2021-04-02 VITALS — WEIGHT: 303.79 LBS | HEIGHT: 74 IN | BODY MASS INDEX: 38.99 KG/M2

## 2021-04-02 DIAGNOSIS — M54.2 NECK PAIN: ICD-10-CM

## 2021-04-02 DIAGNOSIS — Z01.811 PRE-OPERATIVE RESPIRATORY EXAMINATION: ICD-10-CM

## 2021-04-02 DIAGNOSIS — Z01.812 PRE-OPERATIVE LABORATORY EXAMINATION: ICD-10-CM

## 2021-04-02 DIAGNOSIS — D72.829 LEUKOCYTOSIS, UNSPECIFIED TYPE: ICD-10-CM

## 2021-04-02 DIAGNOSIS — Z01.810 PRE-OPERATIVE CARDIOVASCULAR EXAMINATION: ICD-10-CM

## 2021-04-02 LAB
ABO GROUP BLD: NORMAL
ANION GAP SERPL CALC-SCNC: 7 MMOL/L (ref 7–16)
APPEARANCE UR: CLEAR
APTT PPP: 31.1 SEC (ref 24.7–36)
BASOPHILS # BLD AUTO: 0.7 % (ref 0–1.8)
BASOPHILS # BLD: 0.05 K/UL (ref 0–0.12)
BILIRUB UR QL STRIP.AUTO: NEGATIVE
BLD GP AB SCN SERPL QL: NORMAL
BUN SERPL-MCNC: 14 MG/DL (ref 8–22)
CALCIUM SERPL-MCNC: 9.4 MG/DL (ref 8.5–10.5)
CHLORIDE SERPL-SCNC: 106 MMOL/L (ref 96–112)
CO2 SERPL-SCNC: 22 MMOL/L (ref 20–33)
COLOR UR: YELLOW
CREAT SERPL-MCNC: 1.36 MG/DL (ref 0.5–1.4)
EKG IMPRESSION: NORMAL
EOSINOPHIL # BLD AUTO: 0.08 K/UL (ref 0–0.51)
EOSINOPHIL NFR BLD: 1.2 % (ref 0–6.9)
ERYTHROCYTE [DISTWIDTH] IN BLOOD BY AUTOMATED COUNT: 46.3 FL (ref 35.9–50)
GLUCOSE SERPL-MCNC: 96 MG/DL (ref 65–99)
GLUCOSE UR STRIP.AUTO-MCNC: NEGATIVE MG/DL
HCT VFR BLD AUTO: 44.7 % (ref 42–52)
HGB BLD-MCNC: 14 G/DL (ref 14–18)
IMM GRANULOCYTES # BLD AUTO: 0.02 K/UL (ref 0–0.11)
IMM GRANULOCYTES NFR BLD AUTO: 0.3 % (ref 0–0.9)
INR PPP: 1.05 (ref 0.87–1.13)
KETONES UR STRIP.AUTO-MCNC: NEGATIVE MG/DL
LEUKOCYTE ESTERASE UR QL STRIP.AUTO: NEGATIVE
LYMPHOCYTES # BLD AUTO: 1.46 K/UL (ref 1–4.8)
LYMPHOCYTES NFR BLD: 21.4 % (ref 22–41)
MCH RBC QN AUTO: 27.1 PG (ref 27–33)
MCHC RBC AUTO-ENTMCNC: 31.3 G/DL (ref 33.7–35.3)
MCV RBC AUTO: 86.5 FL (ref 81.4–97.8)
MICRO URNS: NORMAL
MONOCYTES # BLD AUTO: 0.58 K/UL (ref 0–0.85)
MONOCYTES NFR BLD AUTO: 8.5 % (ref 0–13.4)
NEUTROPHILS # BLD AUTO: 4.62 K/UL (ref 1.82–7.42)
NEUTROPHILS NFR BLD: 67.9 % (ref 44–72)
NITRITE UR QL STRIP.AUTO: NEGATIVE
NRBC # BLD AUTO: 0 K/UL
NRBC BLD-RTO: 0 /100 WBC
PH UR STRIP.AUTO: 6.5 [PH] (ref 5–8)
PLATELET # BLD AUTO: 274 K/UL (ref 164–446)
PMV BLD AUTO: 9.7 FL (ref 9–12.9)
POTASSIUM SERPL-SCNC: 4 MMOL/L (ref 3.6–5.5)
PROT UR QL STRIP: NEGATIVE MG/DL
PROTHROMBIN TIME: 14 SEC (ref 12–14.6)
RBC # BLD AUTO: 5.17 M/UL (ref 4.7–6.1)
RBC UR QL AUTO: NEGATIVE
RH BLD: NORMAL
SARS-COV-2 RNA RESP QL NAA+PROBE: NOTDETECTED
SODIUM SERPL-SCNC: 135 MMOL/L (ref 135–145)
SP GR UR STRIP.AUTO: 1.01
SPECIMEN SOURCE: NORMAL
UROBILINOGEN UR STRIP.AUTO-MCNC: 0.2 MG/DL
WBC # BLD AUTO: 6.8 K/UL (ref 4.8–10.8)

## 2021-04-02 PROCEDURE — C9803 HOPD COVID-19 SPEC COLLECT: HCPCS

## 2021-04-02 PROCEDURE — 86850 RBC ANTIBODY SCREEN: CPT

## 2021-04-02 PROCEDURE — 85610 PROTHROMBIN TIME: CPT

## 2021-04-02 PROCEDURE — 93005 ELECTROCARDIOGRAM TRACING: CPT

## 2021-04-02 PROCEDURE — 82306 VITAMIN D 25 HYDROXY: CPT

## 2021-04-02 PROCEDURE — 81003 URINALYSIS AUTO W/O SCOPE: CPT

## 2021-04-02 PROCEDURE — 86901 BLOOD TYPING SEROLOGIC RH(D): CPT

## 2021-04-02 PROCEDURE — 80048 BASIC METABOLIC PNL TOTAL CA: CPT

## 2021-04-02 PROCEDURE — 36415 COLL VENOUS BLD VENIPUNCTURE: CPT

## 2021-04-02 PROCEDURE — U0003 INFECTIOUS AGENT DETECTION BY NUCLEIC ACID (DNA OR RNA); SEVERE ACUTE RESPIRATORY SYNDROME CORONAVIRUS 2 (SARS-COV-2) (CORONAVIRUS DISEASE [COVID-19]), AMPLIFIED PROBE TECHNIQUE, MAKING USE OF HIGH THROUGHPUT TECHNOLOGIES AS DESCRIBED BY CMS-2020-01-R: HCPCS

## 2021-04-02 PROCEDURE — 71046 X-RAY EXAM CHEST 2 VIEWS: CPT

## 2021-04-02 PROCEDURE — 85025 COMPLETE CBC W/AUTO DIFF WBC: CPT

## 2021-04-02 PROCEDURE — 86900 BLOOD TYPING SEROLOGIC ABO: CPT

## 2021-04-02 PROCEDURE — 85730 THROMBOPLASTIN TIME PARTIAL: CPT

## 2021-04-02 PROCEDURE — 72050 X-RAY EXAM NECK SPINE 4/5VWS: CPT

## 2021-04-02 PROCEDURE — 93010 ELECTROCARDIOGRAM REPORT: CPT | Performed by: INTERNAL MEDICINE

## 2021-04-02 PROCEDURE — U0005 INFEC AGEN DETEC AMPLI PROBE: HCPCS

## 2021-04-02 ASSESSMENT — FIBROSIS 4 INDEX: FIB4 SCORE: 0.33

## 2021-04-04 LAB — 25(OH)D3 SERPL-MCNC: 19 NG/ML (ref 30–80)

## 2021-04-19 DIAGNOSIS — I10 ESSENTIAL HYPERTENSION: ICD-10-CM

## 2021-04-19 RX ORDER — DOXAZOSIN MESYLATE 1 MG/1
1 TABLET ORAL
Qty: 90 TABLET | Refills: 3 | Status: SHIPPED | OUTPATIENT
Start: 2021-04-19 | End: 2021-04-23 | Stop reason: SDUPTHER

## 2021-04-20 ENCOUNTER — TELEPHONE (OUTPATIENT)
Dept: VASCULAR LAB | Facility: MEDICAL CENTER | Age: 46
End: 2021-04-20

## 2021-04-21 ENCOUNTER — TELEPHONE (OUTPATIENT)
Dept: VASCULAR LAB | Facility: MEDICAL CENTER | Age: 46
End: 2021-04-21

## 2021-04-21 NOTE — TELEPHONE ENCOUNTER
I spoke with pt today and he cancelled his 4/23 appt w/ Dr. Rehman. He's planning on having surgery done some time in the future at the [facility] and once surgery is scheduled he will call back at a later date to reschedule follow up vascular medicine appt.

## 2021-04-22 ENCOUNTER — APPOINTMENT (OUTPATIENT)
Dept: PHYSICAL MEDICINE AND REHAB | Facility: MEDICAL CENTER | Age: 46
End: 2021-04-22
Payer: COMMERCIAL

## 2021-04-23 DIAGNOSIS — I10 ESSENTIAL HYPERTENSION: ICD-10-CM

## 2021-04-23 RX ORDER — DOXAZOSIN 2 MG/1
2 TABLET ORAL
Qty: 90 TABLET | Refills: 1 | Status: SHIPPED | OUTPATIENT
Start: 2021-04-23 | End: 2021-10-05 | Stop reason: SDUPTHER

## 2021-04-23 RX ORDER — DOXAZOSIN MESYLATE 1 MG/1
1 TABLET ORAL
Qty: 90 TABLET | Refills: 3 | Status: SHIPPED | OUTPATIENT
Start: 2021-04-23 | End: 2021-04-23

## 2021-04-23 NOTE — TELEPHONE ENCOUNTER
Got a fax from [facility]. Pt stated to them that he should be taking 2mg not 1mg. If that's true they need a new rx sent. Thanks

## 2021-04-26 DIAGNOSIS — F31.32 BIPOLAR 1 DISORDER, DEPRESSED, MODERATE (HCC): ICD-10-CM

## 2021-04-26 RX ORDER — HYDROXYZINE HYDROCHLORIDE 25 MG/1
25 TABLET, FILM COATED ORAL 3 TIMES DAILY PRN
Qty: 30 TABLET | Refills: 0
Start: 2021-04-26 | End: 2021-06-25

## 2021-04-26 NOTE — PROGRESS NOTES
1. Bipolar 1 disorder, depressed, moderate (HCC)  hydrOXYzine HCl (ATARAX) 25 MG Tab     Added to chart.   Cesilia Dotson P.A.-C.

## 2021-05-10 DIAGNOSIS — M54.12 CERVICAL RADICULOPATHY: ICD-10-CM

## 2021-05-10 RX ORDER — GABAPENTIN 300 MG/1
300 CAPSULE ORAL 2 TIMES DAILY
Qty: 60 CAPSULE | Refills: 2 | Status: SHIPPED | OUTPATIENT
Start: 2021-05-10 | End: 2021-08-17 | Stop reason: SDUPTHER

## 2021-06-22 ENCOUNTER — PATIENT MESSAGE (OUTPATIENT)
Dept: MEDICAL GROUP | Facility: LAB | Age: 46
End: 2021-06-22

## 2021-06-25 ENCOUNTER — OFFICE VISIT (OUTPATIENT)
Dept: MEDICAL GROUP | Facility: LAB | Age: 46
End: 2021-06-25
Payer: COMMERCIAL

## 2021-06-25 VITALS
SYSTOLIC BLOOD PRESSURE: 142 MMHG | WEIGHT: 295.2 LBS | RESPIRATION RATE: 20 BRPM | DIASTOLIC BLOOD PRESSURE: 92 MMHG | TEMPERATURE: 99.3 F | HEIGHT: 75 IN | OXYGEN SATURATION: 96 % | BODY MASS INDEX: 36.7 KG/M2 | HEART RATE: 81 BPM

## 2021-06-25 DIAGNOSIS — Z98.1 S/P CERVICAL SPINAL FUSION: Primary | ICD-10-CM

## 2021-06-25 PROCEDURE — 99213 OFFICE O/P EST LOW 20 MIN: CPT | Performed by: PHYSICIAN ASSISTANT

## 2021-06-25 RX ORDER — METHOCARBAMOL 500 MG/1
500 TABLET, FILM COATED ORAL
COMMUNITY
Start: 2021-06-21 | End: 2021-06-25

## 2021-06-25 RX ORDER — FAMOTIDINE 20 MG/1
20 TABLET, FILM COATED ORAL
COMMUNITY
End: 2023-05-26

## 2021-06-25 ASSESSMENT — FIBROSIS 4 INDEX: FIB4 SCORE: 0.39

## 2021-06-25 NOTE — ASSESSMENT & PLAN NOTE
Pt presents today for follow up s/p anterior cervical fusion that was completed at [facility] in [place] 06/10/2021.   Surgery and post op notes are available for review in Epic/Care Everywhere.   Pt is doing well, symptoms in the R UE are improving, surgical site is healing well, though he does have concern for new/worsening left sided neck and arm pain.   ROM is limited in the neck.     No longer using muscle relaxer or pain medication  Pain is managed with OTC Tylenol.   Due to follow up with Dr Neves here in [place] regarding R carpal tunnel surgery that is tentatively 07/28/2021.   Unsure if he is to follow up with Dr. Best at this time     Per recommendation from [facility] - pt is to start PT 6 weeks after surgery.

## 2021-06-25 NOTE — PROGRESS NOTES
Subjective:   Chaitanya Kelly is a 45 y.o. male here today for surgery follow up     S/P cervical spinal fusion  Pt presents today for follow up s/p anterior cervical fusion that was completed at [facility] in [place] 06/10/2021.   Surgery and post op notes are available for review in Epic/Care Everywhere.   Pt is doing well, symptoms in the R UE are improving, surgical site is healing well, though he does have concern for new/worsening left sided neck and arm pain.   ROM is limited in the neck.     No longer using muscle relaxer or pain medication  Pain is managed with OTC Tylenol.   Due to follow up with Dr Neves here in [place] regarding R carpal tunnel surgery that is tentatively 07/28/2021.   Unsure if he is to follow up with Dr. Best at this time     Per recommendation from [facility] - pt is to start PT 6 weeks after surgery.        Current medicines (including changes today)  Current Outpatient Medications   Medication Sig Dispense Refill   • famotidine (PEPCID) 20 MG Tab Take 20 mg by mouth.     • gabapentin (NEURONTIN) 300 MG Cap Take 1 capsule by mouth 2 times a day. 60 capsule 2   • doxazosin (CARDURA) 2 MG Tab Take 1 tablet by mouth at bedtime. 90 tablet 1   • verapamil ER (CALAN-SR) 240 MG Tab CR Take 1 tablet by mouth every day for 360 days. (Patient not taking: Reported on 2/23/2021) 90 tablet 3   • acetaminophen (TYLENOL) 500 MG Tab Take 2 Tabs by mouth 3 times a day as needed (pain.  Do not exceed 3000 mg of total acetaminophen per day). 180 Tab 6   • meclizine (ANTIVERT) 25 MG Tab Take 1 Tab by mouth 3 times a day as needed. 30 Tab 3   • buPROPion (WELLBUTRIN XL) 150 MG XL tablet Take 1 Tab by mouth 2 Times a Day. 90 Tab 0   • lamoTRIgine (LAMICTAL) 100 MG Tab TAKE 1 TABLET BY MOUTH ONCE DAILY IN THE MORNING 90 Tab 1   • lithium CR (ESKALITH CR) 450 MG Tab CR TAKE 1 TABLET BY MOUTH TWICE DAILY 180 Tab 1     No current facility-administered medications for this visit.     He  has a past medical  "history of Anxiety, Arthritis, ASTHMA, Bipolar 2 disorder, Bipolar disorder ([facility]), Headache(784.0), Hypertension, Migraine, TOBI (obstructive sleep apnea), Pain (10/10/2017), Seizure ([facility]) (1980), and Snoring.    ROS   As per HPI  No chest pain, no shortness of breath, no abdominal pain       Objective:     /92 (BP Location: Left arm, Patient Position: Sitting, BP Cuff Size: Large adult)   Pulse 81   Temp 37.4 °C (99.3 °F) (Temporal)   Resp 20   Ht 1.905 m (6' 3\")   Wt (!) 134 kg (295 lb 3.2 oz)   SpO2 96%  Body mass index is 36.9 kg/m².   Physical Exam:  Constitutional: Alert, no distress.  Skin: Warm, dry, good turgor, well healing surgical site located on anterior neck.   Eye: Equal, round.  conjunctiva clear, lids normal.  Neck: Trachea midline, no masses, no thyromegaly. Limited ROM in the neck due to recent neck surgery   Respiratory: Unlabored respiratory effort, lungs clear to auscultation, no wheezes, no ronchi.  Cardiovascular: Normal S1, S2, no murmur, no edema.  Psych: Alert and oriented x3, normal affect and mood.    Assessment and Plan:   The following treatment plan was discussed    1. S/P cervical spinal fusion  Follow up; doing well s/p cervical spine fusion 06/10/2021.  To start PT 6 weeks after surgery  Continue to monitor pain/inflmmation and any changes in sensation in the UE, b/l.   At this time I do think his symptoms on the left side are 2/2 over cpmpensation for previous pain and numbness/tingling in the R UE.  He does sleep on and rolls onto his left side at night  Continue to monitor - to follow up with Dr. Neves in near future.   Consider returning to Dr. Best if pain/symptoms on the left do not resolve or are worsening.   - REFERRAL TO PHYSICAL THERAPY    My total time spent caring for the patient on the day of the encounter was 35 minutes.   This does not include time spent on separately billable procedures/tests.    Followup: Return in about 3 months (around 9/25/2021), " or if symptoms worsen or fail to improve. *After he completed several sessions of PT; sooner if needed of course.        Cesilia Dotson P.A.-C.  Supervising MD: Dr. Reza Chin MD  06/25/21

## 2021-07-12 ENCOUNTER — TELEMEDICINE (OUTPATIENT)
Dept: MEDICAL GROUP | Facility: LAB | Age: 46
End: 2021-07-12
Payer: COMMERCIAL

## 2021-07-12 DIAGNOSIS — M54.2 NECK PAIN: ICD-10-CM

## 2021-07-12 PROCEDURE — 99214 OFFICE O/P EST MOD 30 MIN: CPT | Mod: 95,CR | Performed by: PHYSICIAN ASSISTANT

## 2021-07-12 RX ORDER — METHOCARBAMOL 500 MG/1
TABLET, FILM COATED ORAL
COMMUNITY
Start: 2021-06-10 | End: 2021-11-09

## 2021-07-12 RX ORDER — OXYCODONE HYDROCHLORIDE 5 MG/1
5 TABLET ORAL
COMMUNITY
Start: 2021-07-02 | End: 2022-03-29

## 2021-07-12 NOTE — ASSESSMENT & PLAN NOTE
Chronic, continuing s/p neck surgery   Patient had surgery completed at [facility] on [address].  He continues to have significant bilateral neck pain, numbness tingling in bilateral upper extremities.  Using Oxycodone and using Tylenol with some relief.  Unable to use NSAIDs/steroids as he is currently using Lamictal for bipolar disorder.    Is currently under the care of Dr. Neves who is working him up for possible carpal tunnel surgery.    Patient will start physical therapy for his neck 6 weeks status post surgery.

## 2021-07-13 NOTE — PROGRESS NOTES
This evaluation was conducted via Zoom using secure and encrypted videoconferencing technology. The patient was in a private location in the state of [place].    The patient's identity was confirmed and verbal consent was obtained for this virtual visit.    Subjective:     CC: Chronic neck pain, no improvement from recent surgery  Chaitanya Kelly is a 45 y.o. male presenting to discuss the evaluation and management of ongoing neck pain    Neck pain  Chronic, continuing s/p neck surgery   Patient had surgery completed at [facility] on [address].  He continues to have significant bilateral neck pain, numbness tingling in bilateral upper extremities.  Using Oxycodone and using Tylenol with some relief.  Unable to use NSAIDs/steroids as he is currently using Lamictal for bipolar disorder.    Is currently under the care of Dr. Neves who is working him up for possible carpal tunnel surgery.    Patient will start physical therapy for his neck 6 weeks status post surgery.      ROS  See HPI  Constitutional: Negative for fever, chills and malaise/fatigue.   Respiratory: Negative for cough and shortness of breath.    Cardiovascular: Negative for leg swelling.   Skin: Negative for rash.   Psychiatric/Behavioral: Negative for depression.  The patient is not nervous/anxious.      Allergies   Allergen Reactions   • Ibuprofen Unspecified     Interaction with Lithium   • Latex Itching   • Naprosyn [Naproxen] Unspecified     Interaction with Lithium       Current medicines (including changes today)  Current Outpatient Medications   Medication Sig Dispense Refill   • oxyCODONE immediate-release (ROXICODONE) 5 MG Tab Take 5 mg by mouth.     • methocarbamol (ROBAXIN) 500 MG Tab      • famotidine (PEPCID) 20 MG Tab Take 20 mg by mouth.     • gabapentin (NEURONTIN) 300 MG Cap Take 1 capsule by mouth 2 times a day. 60 capsule 2   • doxazosin (CARDURA) 2 MG Tab Take 1 tablet by mouth at bedtime. 90 tablet 1   • verapamil ER (CALAN-SR)  240 MG Tab CR Take 1 tablet by mouth every day for 360 days. 90 tablet 3   • acetaminophen (TYLENOL) 500 MG Tab Take 2 Tabs by mouth 3 times a day as needed (pain.  Do not exceed 3000 mg of total acetaminophen per day). 180 Tab 6   • buPROPion (WELLBUTRIN XL) 150 MG XL tablet Take 1 Tab by mouth 2 Times a Day. 90 Tab 0   • lamoTRIgine (LAMICTAL) 100 MG Tab TAKE 1 TABLET BY MOUTH ONCE DAILY IN THE MORNING 90 Tab 1   • lithium CR (ESKALITH CR) 450 MG Tab CR TAKE 1 TABLET BY MOUTH TWICE DAILY 180 Tab 1     No current facility-administered medications for this visit.       He  has a past medical history of Anxiety, Arthritis, ASTHMA, Bipolar 2 disorder, Bipolar disorder (HCC), Headache(784.0), Hypertension, Migraine, TOBI (obstructive sleep apnea), Pain (10/10/2017), Seizure (HCC) (), and Snoring.  He  has a past surgical history that includes umbilical hernia repair (N/A, 10/18/2017); block epidural steroid injection (N/A, 2020); other; umbilical hernia repair; laminotomy; and cervical disk and fusion anterior (06/10/2021).      Family History   Problem Relation Age of Onset   • Heart Disease Father 41        heart attack   • Heart Disease Paternal Aunt    • Heart Disease Paternal Uncle         CABGs    • Stroke Paternal Uncle         aneursym   • Heart Disease Paternal Grandfather    • Cancer Maternal Aunt    • Lung Cancer Maternal Aunt      Family Status   Relation Name Status   • Mo  Alive   • Fa     • PAunt  (Not Specified)   • PUnc     • PGFa     • MAunt  Alive   • MAunt         Patient Active Problem List    Diagnosis Date Noted   • S/P cervical spinal fusion 2021   • Skin change 2020   • Ingrown toenail of both feet 10/28/2020   • Vitamin D deficiency 10/03/2020   • Celiac disease 2020   • Vertigo 2020   • Essential hypertension 2020   • Umbilical hernia without obstruction or gangrene 10/18/2017   • Abdominal wall bulge 2017   • Mild  intermittent asthma without complication 12/22/2016   • Migraine without aura and without status migrainosus, not intractable 12/22/2016   • Chronic fatigue 12/01/2016   • Bipolar 1 disorder, depressed, moderate (HCC) 10/13/2016   • Neck pain 09/09/2016   • Obstructive sleep apnea 04/15/2016   • Dyslipidemia 02/06/2014   • Obesity (BMI 30-39.9) 02/06/2014   • Family history of early CAD 02/06/2014          Objective:   There were no vitals taken for this visit.    Physical Exam:  Constitutional: Alert, no distress, well-groomed.  Skin: No rashes in visible areas.  Eye: Round. Conjunctiva clear, lids normal. No icterus.   ENMT: Lips pink without lesions, good dentition, moist mucous membranes. Phonation normal.  Neck: No masses, no thyromegaly. Moves freely without pain.  CV: Pulse as reported by patient  Respiratory: Unlabored respiratory effort, no cough or audible wheeze  Psych: Alert and oriented x3, normal affect and mood.       Assessment and Plan:   The following treatment plan was discussed:     1. Neck pain  Chronic neck pain.  Status post cervical fusion  Reports that he is still dealing with a lot of pain, numbness tingling in bilateral upper extremities.  Unable to use NSAIDs or steroids due to interaction with Lamictal.  Okay to continue oxycodone and Tylenol.  Should continue Tylenol prn more mild to moderate pain.     He is aware that narcotics can cause drowsiness and are not to be used with any other sedating substance or alcohol.   Pt expressed verbal understanding. Aware that these are habit forming and he is to notify me if he is starting to use/need more/higher dose, in which case we would want to seek other forms of pain control.     Encouraged him to continue to monitor symptoms.  Continue care with Dr. Neves for possible carpal tunnel surgery.  May need to revisit pain management with Dr. Best if neck pain persists    Patient to start physical therapy 6 weeks after surgery.  We will follow  up with him after he is completed about 2 to 3 weeks of physical therapy.    No medications are prescribed today.    Other orders  - oxyCODONE immediate-release (ROXICODONE) 5 MG Tab; Take 5 mg by mouth.  - methocarbamol (ROBAXIN) 500 MG Tab      My total time spent caring for the patient on the day of the encounter was 30 minutes.   This does not include time spent on separately billable procedures/tests.    Follow-up: Return in about 4 weeks (around 8/9/2021), or if symptoms worsen or fail to improve.    KAYLEY LightAEDUARDO.  Supervising MD: Dr. Reza Chin MD  07/13/21

## 2021-08-17 DIAGNOSIS — M54.12 CERVICAL RADICULOPATHY: ICD-10-CM

## 2021-08-17 RX ORDER — GABAPENTIN 300 MG/1
300 CAPSULE ORAL 2 TIMES DAILY
Qty: 60 CAPSULE | Refills: 2 | Status: SHIPPED | OUTPATIENT
Start: 2021-08-17 | End: 2021-08-30 | Stop reason: SDUPTHER

## 2021-08-17 NOTE — TELEPHONE ENCOUNTER
Received request via: Pharmacy    Was the patient seen in the last year in this department? Yes  7/12/21  Does the patient have an active prescription (recently filled or refills available) for medication(s) requested? No

## 2021-08-23 ENCOUNTER — OFFICE VISIT (OUTPATIENT)
Dept: MEDICAL GROUP | Facility: LAB | Age: 46
End: 2021-08-23
Payer: COMMERCIAL

## 2021-08-23 VITALS
TEMPERATURE: 98.6 F | DIASTOLIC BLOOD PRESSURE: 90 MMHG | HEART RATE: 52 BPM | OXYGEN SATURATION: 94 % | WEIGHT: 301.8 LBS | RESPIRATION RATE: 18 BRPM | HEIGHT: 75 IN | SYSTOLIC BLOOD PRESSURE: 136 MMHG | BODY MASS INDEX: 37.52 KG/M2

## 2021-08-23 DIAGNOSIS — G89.29 CHRONIC LEFT SHOULDER PAIN: ICD-10-CM

## 2021-08-23 DIAGNOSIS — M25.512 CHRONIC LEFT SHOULDER PAIN: ICD-10-CM

## 2021-08-23 PROCEDURE — 99213 OFFICE O/P EST LOW 20 MIN: CPT | Performed by: PHYSICIAN ASSISTANT

## 2021-08-23 ASSESSMENT — FIBROSIS 4 INDEX: FIB4 SCORE: 0.39

## 2021-08-25 PROBLEM — G89.29 CHRONIC LEFT SHOULDER PAIN: Status: ACTIVE | Noted: 2021-08-25

## 2021-08-25 PROBLEM — M25.512 CHRONIC LEFT SHOULDER PAIN: Status: ACTIVE | Noted: 2021-08-25

## 2021-08-25 NOTE — PROGRESS NOTES
Subjective:   Chaitanya Kelly is a 45 y.o. male here today for chronic L shoulder pain     Chronic left shoulder pain  Follow up; ongoing and chronic L shoulder pain.   No overt trauma.   Continue to deal with neck pain s/p neck surgery, though noticing worsening L shoulder pain and numbness into the L arm and hand.   Also has ongoing workup with Dr. Neves for possible carpal tunnel release.   Pt reports significant decrease in ROM. No weakness, though unable to carry/ifting items about 90 degrees with the L arm.        Current medicines (including changes today)  Current Outpatient Medications   Medication Sig Dispense Refill   • gabapentin (NEURONTIN) 300 MG Cap Take 1 Capsule by mouth 2 times a day. 60 Capsule 2   • oxyCODONE immediate-release (ROXICODONE) 5 MG Tab Take 5 mg by mouth.     • methocarbamol (ROBAXIN) 500 MG Tab      • famotidine (PEPCID) 20 MG Tab Take 20 mg by mouth.     • doxazosin (CARDURA) 2 MG Tab Take 1 tablet by mouth at bedtime. 90 tablet 1   • verapamil ER (CALAN-SR) 240 MG Tab CR Take 1 tablet by mouth every day for 360 days. 90 tablet 3   • acetaminophen (TYLENOL) 500 MG Tab Take 2 Tabs by mouth 3 times a day as needed (pain.  Do not exceed 3000 mg of total acetaminophen per day). 180 Tab 6   • buPROPion (WELLBUTRIN XL) 150 MG XL tablet Take 1 Tab by mouth 2 Times a Day. 90 Tab 0   • lamoTRIgine (LAMICTAL) 100 MG Tab TAKE 1 TABLET BY MOUTH ONCE DAILY IN THE MORNING 90 Tab 1   • lithium CR (ESKALITH CR) 450 MG Tab CR TAKE 1 TABLET BY MOUTH TWICE DAILY 180 Tab 1     No current facility-administered medications for this visit.     He  has a past medical history of Anxiety, Arthritis, ASTHMA, Bipolar 2 disorder, Bipolar disorder ([facility]), Headache(784.0), Hypertension, Migraine, TOBI (obstructive sleep apnea), Pain (10/10/2017), Seizure ([facility]) (1980), and Snoring.    ROS   As per HPI  No chest pain, no shortness of breath, no abdominal pain       Objective:     /90 (BP Location: Left  "arm, Patient Position: Sitting, BP Cuff Size: Large adult)   Pulse (!) 52   Temp 37 °C (98.6 °F) (Temporal)   Resp 18   Ht 1.905 m (6' 3\")   Wt (!) 137 kg (301 lb 12.8 oz)   SpO2 94%  Body mass index is 37.72 kg/m².   Physical Exam:  Constitutional: Alert, no distress.  Skin: Warm, dry, good turgor, no rashes in visible areas.  Eye: Equal, round  conjunctiva clear, lids normal.  Neck: Trachea midline,   Respiratory: Unlabored respiratory effort,  L SHOULDER: No step off or obvious deformity to the L shoulder. Moderate TTP in the anterior GH joint, AC joint. No TTP at posterior GH joint or SC joint. Decreased ROM with abduction - unable to got past 90 degrees. +drop can test and +lift off test. Strength is 5/5 in UE, b/l.   Psych: Alert and oriented x3, normal affect and mood.        Assessment and Plan:   The following treatment plan was discussed    1. Chronic left shoulder pain  Follow-up on chronic left shoulder pain.  Patient has been working with physical therapy for several weeks for both his neck and shoulder. Continues to have pain despite conservative regimen.  Will obtain MRI left shoulder to rule out any rotator cuff injury given positive drop can test and positive liftoff test.  Continue to monitor, follow-up with Ortho  - MR-SHOULDER-W/O LEFT; Future      Followup: No follow-ups on file.       Cesilia Dotson P.A.-C.  Supervising MD: Dr. Reza Chin MD  08/25/21      "

## 2021-08-30 ENCOUNTER — OFFICE VISIT (OUTPATIENT)
Dept: MEDICAL GROUP | Facility: LAB | Age: 46
End: 2021-08-30
Payer: COMMERCIAL

## 2021-08-30 VITALS
RESPIRATION RATE: 18 BRPM | TEMPERATURE: 99.2 F | HEART RATE: 89 BPM | OXYGEN SATURATION: 97 % | SYSTOLIC BLOOD PRESSURE: 158 MMHG | BODY MASS INDEX: 37.42 KG/M2 | DIASTOLIC BLOOD PRESSURE: 98 MMHG | HEIGHT: 75 IN | WEIGHT: 301 LBS

## 2021-08-30 DIAGNOSIS — Z98.1 S/P CERVICAL SPINAL FUSION: ICD-10-CM

## 2021-08-30 DIAGNOSIS — M54.12 CERVICAL RADICULOPATHY: ICD-10-CM

## 2021-08-30 DIAGNOSIS — G89.28 OTHER CHRONIC POSTPROCEDURAL PAIN: ICD-10-CM

## 2021-08-30 PROCEDURE — 99214 OFFICE O/P EST MOD 30 MIN: CPT | Performed by: PHYSICIAN ASSISTANT

## 2021-08-30 RX ORDER — GABAPENTIN 300 MG/1
300 CAPSULE ORAL 3 TIMES DAILY
Qty: 90 CAPSULE | Refills: 1 | Status: SHIPPED | OUTPATIENT
Start: 2021-08-30 | End: 2021-12-13 | Stop reason: SDUPTHER

## 2021-08-30 ASSESSMENT — FIBROSIS 4 INDEX: FIB4 SCORE: 0.39

## 2021-08-30 NOTE — ASSESSMENT & PLAN NOTE
Follow up; ongoing and chronic L shoulder pain.   No overt trauma.   Continue to deal with neck pain s/p neck surgery, though noticing worsening L shoulder pain and numbness into the L arm and hand.   Also has ongoing workup with Dr. Neves for possible carpal tunnel release.   Pt reports significant decrease in ROM. No weakness, though unable to carry/ifting items about 90 degrees with the L arm.    Burow's Graft Text: The defect edges were debeveled with a #15 scalpel blade.  Given the location of the defect, shape of the defect, the proximity to free margins and the presence of a standing cone deformity a Burow's skin graft was deemed most appropriate. The standing cone was removed and this tissue was then trimmed to the shape of the primary defect. The adipose tissue was also removed until only dermis and epidermis were left.  The skin margins of the secondary defect were undermined to an appropriate distance in all directions utilizing iris scissors.  The secondary defect was closed with interrupted buried subcutaneous sutures.  The skin edges were then re-apposed with running  sutures.  The skin graft was then placed in the primary defect and oriented appropriately.

## 2021-08-30 NOTE — ASSESSMENT & PLAN NOTE
3-8/10 chronic pain.   Is continuing to complete PT; taking about 3 days after PT for pain levels to calm down after a therapy session.     Worsening left shoulder numbness/tingling.   Recommend proceeding with MRI of the shoulder?

## 2021-09-01 ENCOUNTER — OFFICE VISIT (OUTPATIENT)
Dept: MEDICAL GROUP | Facility: LAB | Age: 46
End: 2021-09-01
Payer: COMMERCIAL

## 2021-09-01 DIAGNOSIS — G89.29 CHRONIC LEFT SHOULDER PAIN: ICD-10-CM

## 2021-09-01 DIAGNOSIS — M25.512 CHRONIC LEFT SHOULDER PAIN: ICD-10-CM

## 2021-09-01 PROBLEM — G56.00 CARPAL TUNNEL SYNDROME: Status: ACTIVE | Noted: 2021-09-01

## 2021-09-01 PROCEDURE — 99212 OFFICE O/P EST SF 10 MIN: CPT | Performed by: PHYSICIAN ASSISTANT

## 2021-09-01 NOTE — ASSESSMENT & PLAN NOTE
Ongoing chronic left shoulder pain and neck pain s/p neck surgery.   Worsened since surgery.   Needs updated [facility] paperwork.

## 2021-09-01 NOTE — PROGRESS NOTES
Telephone Appointment Visit   As a means of avoiding spread of COVID-19, this visit is being conducted by telephone. This telephone visit was initiated by the patient and they verbally consented.    Time at start of call: 1pm    Reason for Call:  Symptom Follow-up    HPI:    Chronic pain syndrome   Needs updated FMLA paperwork   Labs / Images Reviewed:   None    Assessment and Plan:     1. Chronic left shoulder pain  Called and spoke with the patient today to update his FMLA forms.  He continues to have ongoing neck and left shoulder pain status post neck surgery.  This pain seems to be worse than even before surgery.  He does continue to have numbness, tingling, pain and weakness in the left arm.  He will connect with Ortho regarding carpal tunnel release surgery of the left wrist.  It is unclear at this time if this will help resolve the symptoms in the left arm.  Patient is awaiting updated neck and left shoulder MRI.      Follow-up: Prn     Time at end of call: 1:11pm   Total Time Spent: 11-20 minutes    Cesilia Dotson P.A.-C.

## 2021-09-02 NOTE — PROGRESS NOTES
Subjective:   Chaitanya Kelly is a 45 y.o. male here today for ongoing chronic pain status post neck fusion.  Left shoulder pain    Other chronic postprocedural pain  3-8/10 chronic pain.   Is continuing to complete PT; taking about 3 days after PT for pain levels to calm down after a therapy session.     Worsening left shoulder numbness/tingling.   Recommend proceeding with MRI of the shoulder?       Current medicines (including changes today)  Current Outpatient Medications   Medication Sig Dispense Refill   • gabapentin (NEURONTIN) 300 MG Cap Take 1 Capsule by mouth 3 times a day. 90 Capsule 1   • oxyCODONE immediate-release (ROXICODONE) 5 MG Tab Take 5 mg by mouth.     • methocarbamol (ROBAXIN) 500 MG Tab      • famotidine (PEPCID) 20 MG Tab Take 20 mg by mouth.     • doxazosin (CARDURA) 2 MG Tab Take 1 tablet by mouth at bedtime. 90 tablet 1   • verapamil ER (CALAN-SR) 240 MG Tab CR Take 1 tablet by mouth every day for 360 days. 90 tablet 3   • acetaminophen (TYLENOL) 500 MG Tab Take 2 Tabs by mouth 3 times a day as needed (pain.  Do not exceed 3000 mg of total acetaminophen per day). 180 Tab 6   • buPROPion (WELLBUTRIN XL) 150 MG XL tablet Take 1 Tab by mouth 2 Times a Day. 90 Tab 0   • lamoTRIgine (LAMICTAL) 100 MG Tab TAKE 1 TABLET BY MOUTH ONCE DAILY IN THE MORNING 90 Tab 1   • lithium CR (ESKALITH CR) 450 MG Tab CR TAKE 1 TABLET BY MOUTH TWICE DAILY 180 Tab 1     No current facility-administered medications for this visit.     He  has a past medical history of Anxiety, Arthritis, ASTHMA, Bipolar 2 disorder, Bipolar disorder (HCC), Headache(784.0), Hypertension, Migraine, TOBI (obstructive sleep apnea), Pain (10/10/2017), Seizure ([facility]) (1980), and Snoring.    ROS   As per HPI  No chest pain, no shortness of breath, no abdominal pain       Objective:     /98 (BP Location: Left arm, Patient Position: Sitting, BP Cuff Size: Large adult)   Pulse 89   Temp 37.3 °C (99.2 °F) (Temporal)   Resp 18   Ht  "1.905 m (6' 3\")   Wt (!) 137 kg (301 lb)   SpO2 97%  Body mass index is 37.62 kg/m².   Physical Exam:  Constitutional: Alert, no distress.  Skin: Warm, dry, good turgor, no rashes in visible areas.  Eye: Equal, round and reactive, conjunctiva clear, lids normal.  Neck: Trachea midline, well-healed surgical scar in the anterior neck.  Left shoulder: Moderate tenderness palpation the anterior GH joint and AC joint.  No tenderness palpation over the SC joint.  Decreased range of motion in all directions when abducting greater than 90 degrees.  Strength is 5 out of 5 in upper extremity bilaterally.  Sensation is intact, mild numbness in the left UE, compared to the R.  Respiratory: Unlabored respiratory effort  Psych: Alert and oriented x3, normal affect and mood.        Assessment and Plan:   The following treatment plan was discussed    1. Other chronic postprocedural pain  Follow-up, chronic condition.  Patient has reached out to the spine surgeons in [facility] and was recommended to have a follow-up MRI of his cervical spine.  Unfortunately is having ongoing pain.  Prefer to stay off pain medication  - MR-CERVICAL SPINE-W/O; Future    2. Cervical radiculopathy  Follow-up, chronic condition.  He is agreeable to increasing his gabapentin dose to 300 mg 3 times daily.  Continue to monitor symptoms, follow-up as needed  - MR-CERVICAL SPINE-W/O; Future  - gabapentin (NEURONTIN) 300 MG Cap; Take 1 Capsule by mouth 3 times a day.  Dispense: 90 Capsule; Refill: 1    3. S/P cervical spinal fusion  - MR-CERVICAL SPINE-W/O; Future    *Awaiting left shoulder MRI as well    Followup: Return if symptoms worsen or fail to improve.       Cesilia Dotson P.A.-C.  Supervising MD: Dr. Reza Chin MD  09/02/21      "

## 2021-09-09 ENCOUNTER — TELEPHONE (OUTPATIENT)
Dept: MEDICAL GROUP | Facility: LAB | Age: 46
End: 2021-09-09

## 2021-09-09 NOTE — TELEPHONE ENCOUNTER
1. Caller Name: [facility]                Call Back Number: [phone]       [facility] requesting a status of MRI of neck . I do not see an encounter or anything in Media. Aware of shoulder Denial so provider wanted to try MRI of neck. Pt has apt 09/16 with [facility]. Called [facility] back to get some more information. LM

## 2021-09-14 ENCOUNTER — PATIENT MESSAGE (OUTPATIENT)
Dept: MEDICAL GROUP | Facility: LAB | Age: 46
End: 2021-09-14

## 2021-09-14 ENCOUNTER — TELEPHONE (OUTPATIENT)
Dept: MEDICAL GROUP | Facility: LAB | Age: 46
End: 2021-09-14

## 2021-09-14 DIAGNOSIS — Z98.1 S/P CERVICAL SPINAL FUSION: ICD-10-CM

## 2021-09-14 DIAGNOSIS — G89.29 CHRONIC LEFT SHOULDER PAIN: ICD-10-CM

## 2021-09-14 DIAGNOSIS — M25.512 CHRONIC LEFT SHOULDER PAIN: ICD-10-CM

## 2021-09-14 DIAGNOSIS — M54.12 CERVICAL RADICULOPATHY: ICD-10-CM

## 2021-09-14 DIAGNOSIS — G89.28 OTHER CHRONIC POSTPROCEDURAL PAIN: ICD-10-CM

## 2021-09-14 NOTE — PROGRESS NOTES
Called for peer to peer:   Case number: [id number]    Continues to be denied as he has not had an xray nor 6 weeks of dedicated PT for the L shoulder.     Fax number: [phone].  Case reference number and reconsideration.   09/21/21.        Cesilia Dotson P.A.-C.

## 2021-09-14 NOTE — PROGRESS NOTES
1. S/P cervical spinal fusion  DX-CERVICAL SPINE-2 OR 3 VIEWS   2. Cervical radiculopathy  DX-CERVICAL SPINE-2 OR 3 VIEWS   3. Chronic left shoulder pain  DX-SHOULDER 2+ LEFT   4. Other chronic postprocedural pain  DX-CERVICAL SPINE-2 OR 3 VIEWS    DX-SHOULDER 2+ LEFT     Cesilia Dotson P.A.-C.

## 2021-09-14 NOTE — TELEPHONE ENCOUNTER
VOICEMAIL  1. Caller Name: [facility]                      Call Back Number: [phone]    2. Message: [facility] radiology authorization department denied pt's  MRI of the neck. Request to see if pt would need to cancel or if there was another way to do procedure. Pt notified of situation. Please advise.    3. Patient approves office to leave a detailed voicemail/MyChart message: yes

## 2021-09-17 ENCOUNTER — HOSPITAL ENCOUNTER (OUTPATIENT)
Dept: RADIOLOGY | Facility: MEDICAL CENTER | Age: 46
End: 2021-09-17
Attending: PHYSICIAN ASSISTANT
Payer: COMMERCIAL

## 2021-09-17 DIAGNOSIS — G89.28 OTHER CHRONIC POSTPROCEDURAL PAIN: ICD-10-CM

## 2021-09-17 DIAGNOSIS — G89.29 CHRONIC LEFT SHOULDER PAIN: ICD-10-CM

## 2021-09-17 DIAGNOSIS — Z98.1 S/P CERVICAL SPINAL FUSION: ICD-10-CM

## 2021-09-17 DIAGNOSIS — M25.512 CHRONIC LEFT SHOULDER PAIN: ICD-10-CM

## 2021-09-17 DIAGNOSIS — M54.12 CERVICAL RADICULOPATHY: ICD-10-CM

## 2021-09-17 PROCEDURE — 73030 X-RAY EXAM OF SHOULDER: CPT | Mod: LT

## 2021-09-17 PROCEDURE — 72040 X-RAY EXAM NECK SPINE 2-3 VW: CPT

## 2021-10-05 ENCOUNTER — TELEMEDICINE (OUTPATIENT)
Dept: MEDICAL GROUP | Facility: LAB | Age: 46
End: 2021-10-05
Payer: COMMERCIAL

## 2021-10-05 DIAGNOSIS — M25.512 CHRONIC LEFT SHOULDER PAIN: ICD-10-CM

## 2021-10-05 DIAGNOSIS — G89.29 CHRONIC LEFT SHOULDER PAIN: ICD-10-CM

## 2021-10-05 DIAGNOSIS — Z98.1 S/P CERVICAL SPINAL FUSION: ICD-10-CM

## 2021-10-05 DIAGNOSIS — I10 ESSENTIAL HYPERTENSION: ICD-10-CM

## 2021-10-05 PROCEDURE — 99214 OFFICE O/P EST MOD 30 MIN: CPT | Mod: 95 | Performed by: PHYSICIAN ASSISTANT

## 2021-10-05 RX ORDER — LAMOTRIGINE 150 MG/1
200 TABLET ORAL
COMMUNITY
Start: 2021-09-15 | End: 2022-10-05

## 2021-10-05 RX ORDER — DOXAZOSIN 2 MG/1
2 TABLET ORAL
Qty: 90 TABLET | Refills: 1 | Status: SHIPPED | OUTPATIENT
Start: 2021-10-05 | End: 2022-04-25 | Stop reason: SDUPTHER

## 2021-10-05 NOTE — ASSESSMENT & PLAN NOTE
Follow up; has been doing PT since 6 weeks after surgery.   He has increased pain and pain into the left shoulder.   PT consults are available in his chart.     Continues to have radicular symptoms into the left shoulder and left arm.   Unfortunately unable to use NSAIDS due to use of Lithium.   Has been using Oxycodone for pain management and Robaxin as muscle relaxer.     X-ray of the neck and left shoulder have been completed.   He has discussed this with [facility] insurance.    He is hopeful to get back to work, though continues to have persistent pain s/p surgery and despite conservative regimens.

## 2021-10-05 NOTE — PROGRESS NOTES
This evaluation was conducted via Zoom using secure and encrypted videoconferencing technology. The patient was in a private location in the state [facility].    The patient's identity was confirmed and verbal consent was obtained for this virtual visit.    Subjective:     CC: Chronic neck and left shoulder pain   Chaitanya Kelly is a 46 y.o. male presenting to discuss the evaluation and management of neck and left shoulder pain     S/P cervical spinal fusion  Follow up; has been doing PT since 6 weeks after surgery.   He has increased pain and pain into the left shoulder.   PT consults are available in his chart.     Continues to have radicular symptoms into the left shoulder and left arm.   Unfortunately unable to use NSAIDS due to use of Lithium.   Has been using Oxycodone for pain management and Robaxin as muscle relaxer.     X-ray of the neck and left shoulder have been completed.   He has discussed this with [facility] insurance.    He is hopeful to get back to work, though continues to have persistent pain s/p surgery and despite conservative regimens.         ROS  See HPI  Constitutional: Negative for fever, chills and malaise/fatigue.   Respiratory: Negative for cough and shortness of breath.    Cardiovascular: Negative for leg swelling.   Skin: Negative for rash.   Psychiatric/Behavioral: Negative for depression.  The patient is not nervous/anxious.      Allergies   Allergen Reactions   • Ibuprofen Unspecified     Interaction with Lithium   • Latex Itching   • Naprosyn [Naproxen] Unspecified     Interaction with Lithium       Current medicines (including changes today)  Current Outpatient Medications   Medication Sig Dispense Refill   • lamotrigine (LAMICTAL) 150 MG tablet Take 150 mg by mouth.     • doxazosin (CARDURA) 2 MG Tab Take 1 Tablet by mouth at bedtime. 90 Tablet 1   • gabapentin (NEURONTIN) 300 MG Cap Take 1 Capsule by mouth 3 times a day. 90 Capsule 1   • oxyCODONE immediate-release (ROXICODONE) 5  MG Tab Take 5 mg by mouth.     • methocarbamol (ROBAXIN) 500 MG Tab      • famotidine (PEPCID) 20 MG Tab Take 20 mg by mouth.     • verapamil ER (CALAN-SR) 240 MG Tab CR Take 1 tablet by mouth every day for 360 days. 90 tablet 3   • acetaminophen (TYLENOL) 500 MG Tab Take 2 Tabs by mouth 3 times a day as needed (pain.  Do not exceed 3000 mg of total acetaminophen per day). 180 Tab 6   • buPROPion (WELLBUTRIN XL) 150 MG XL tablet Take 1 Tab by mouth 2 Times a Day. 90 Tab 0   • lithium CR (ESKALITH CR) 450 MG Tab CR TAKE 1 TABLET BY MOUTH TWICE DAILY 180 Tab 1     No current facility-administered medications for this visit.       He  has a past medical history of Anxiety, Arthritis, ASTHMA, Bipolar 2 disorder, Bipolar disorder (HCC), Headache(784.0), Hypertension, Migraine, TOBI (obstructive sleep apnea), Pain (10/10/2017), Seizure (HCC) (), and Snoring.  He  has a past surgical history that includes umbilical hernia repair (N/A, 10/18/2017); block epidural steroid injection (N/A, 2020); other; umbilical hernia repair; laminotomy; and cervical disk and fusion anterior (06/10/2021).      Family History   Problem Relation Age of Onset   • Heart Disease Father 41        heart attack   • Heart Disease Paternal Aunt    • Heart Disease Paternal Uncle         CABGs    • Stroke Paternal Uncle         aneursym   • Heart Disease Paternal Grandfather    • Cancer Maternal Aunt    • Lung Cancer Maternal Aunt      Family Status   Relation Name Status   • Mo  Alive   • Fa     • PAunt  (Not Specified)   • PUnc     • PGFa     • MAunt  Alive   • MAunt         Patient Active Problem List    Diagnosis Date Noted   • Carpal tunnel syndrome 2021   • Other chronic postprocedural pain 2021   • Chronic left shoulder pain 2021   • S/P cervical spinal fusion 2021   • Skin change 2020   • Ingrown toenail of both feet 10/28/2020   • Vitamin D deficiency 10/03/2020   • Celiac  disease 09/14/2020   • Vertigo 09/08/2020   • Essential hypertension 07/14/2020   • Umbilical hernia without obstruction or gangrene 10/18/2017   • Abdominal wall bulge 07/25/2017   • Mild intermittent asthma without complication 12/22/2016   • Migraine without aura and without status migrainosus, not intractable 12/22/2016   • Chronic fatigue 12/01/2016   • Bipolar 1 disorder, depressed, moderate (HCC) 10/13/2016   • Neck pain 09/09/2016   • Obstructive sleep apnea 04/15/2016   • Dyslipidemia 02/06/2014   • Obesity (BMI 30-39.9) 02/06/2014   • Family history of early CAD 02/06/2014          Objective:   There were no vitals taken for this visit.    Physical Exam:  Constitutional: Alert, no distress, well-groomed.  Skin: No rashes in visible areas.  Eye: Round. Conjunctiva clear, lids normal. No icterus.   ENMT: Lips pink without lesions, good dentition, moist mucous membranes. Phonation normal.  Neck: No masses, no thyromegaly. Moves freely without pain.  Respiratory: Unlabored respiratory effort, no cough or audible wheeze  Psych: Alert and oriented x3, normal affect and mood.       Assessment and Plan:   The following treatment plan was discussed:     1. S/P cervical spinal fusion  Pt continues to have chronic neck pain s/p spinal fusion in 06/2021.   Xrays have been completed and are in chart for review. PT has also been completed for >6 weeks.   He is unable to use NSAIDS due to use of Lithium.   Recommend that he have a neck MRI for further evaluation.   - MR-CERVICAL SPINE-W/O; Future    2. Chronic left shoulder pain  Follow up; chronic left shoulder pain.   Unclear if this is radiculopathy from the neck or originating from the shoulder.   Again, xray and PT have been completed.   Recommend MRI of the left shoulder.   - MR-SHOULDER-W/O LEFT; Future    3. Essential hypertension  - doxazosin (CARDURA) 2 MG Tab; Take 1 Tablet by mouth at bedtime.  Dispense: 90 Tablet; Refill: 1    Other orders  - lamotrigine  (LAMICTAL) 150 MG tablet; Take 150 mg by mouth.        Follow-up: Return in about 4 weeks (around 11/2/2021), or if symptoms worsen or fail to improve.    Cesilia Dotson P.A.-C.  Supervising MD: Dr. Reza Chin MD  10/05/21

## 2021-10-07 ENCOUNTER — TELEMEDICINE (OUTPATIENT)
Dept: MEDICAL GROUP | Facility: LAB | Age: 46
End: 2021-10-07
Payer: COMMERCIAL

## 2021-10-07 DIAGNOSIS — Z09 FOLLOW UP: ICD-10-CM

## 2021-10-07 PROCEDURE — 99999 PR NO CHARGE: CPT | Performed by: PHYSICIAN ASSISTANT

## 2021-10-07 NOTE — ASSESSMENT & PLAN NOTE
Patient presents today to have forms completed. Unfortunately he does not have the accurate dates on when he needs to return. I recommend that he follow-up with his HR and notify me of the correct dates in order for the forms to be faxed over to his employer.

## 2021-10-07 NOTE — PROGRESS NOTES
Patient presents today to have forms completed. Unfortunately he does not have the accurate dates on when he needs to return. I recommend that he follow-up with his HR and notify me of the correct dates in order for the forms to be faxed over to his employer.    This is a no charge visit  Cesilia Dotson P.A.-C.

## 2021-11-01 ENCOUNTER — TELEPHONE (OUTPATIENT)
Dept: MEDICAL GROUP | Facility: LAB | Age: 46
End: 2021-11-01

## 2021-11-01 NOTE — TELEPHONE ENCOUNTER
Hi!  I know I spoke to your a while ago about needing the update the referral location for the Cervical Spine Xray to [employer]. They are still waiting for this information and the patient was denied for imaging again. Patient still has appointment scheduled for 11/4 pending a peer to peer. Can you please let me know if there is anything else I need to do? Thanks! Ave

## 2021-11-02 ENCOUNTER — TELEPHONE (OUTPATIENT)
Dept: MEDICAL GROUP | Facility: LAB | Age: 46
End: 2021-11-02

## 2021-11-02 NOTE — TELEPHONE ENCOUNTER
Calling peer to peer to neck and shoulder MRI.   Peer to peer scheduled for tomorrow at 730am.     Cesilia Dotson P.A.-C.

## 2021-11-03 ENCOUNTER — TELEPHONE (OUTPATIENT)
Dept: MEDICAL GROUP | Facility: LAB | Age: 46
End: 2021-11-03

## 2021-11-03 NOTE — TELEPHONE ENCOUNTER
Approval for C Spine   [id number]  Expires 12/18/21    L Shoulder MRI:  [id number]  Expires 12/08/2021    Thanks!  Cesilia Dotson P.A.-C.

## 2021-11-09 ENCOUNTER — OFFICE VISIT (OUTPATIENT)
Dept: MEDICAL GROUP | Facility: LAB | Age: 46
End: 2021-11-09
Payer: COMMERCIAL

## 2021-11-09 VITALS
SYSTOLIC BLOOD PRESSURE: 160 MMHG | WEIGHT: 305 LBS | BODY MASS INDEX: 37.92 KG/M2 | OXYGEN SATURATION: 95 % | RESPIRATION RATE: 20 BRPM | DIASTOLIC BLOOD PRESSURE: 92 MMHG | TEMPERATURE: 98.8 F | HEART RATE: 96 BPM | HEIGHT: 75 IN

## 2021-11-09 DIAGNOSIS — M54.12 CERVICAL RADICULOPATHY: ICD-10-CM

## 2021-11-09 DIAGNOSIS — E66.9 OBESITY (BMI 30-39.9): ICD-10-CM

## 2021-11-09 PROCEDURE — 99214 OFFICE O/P EST MOD 30 MIN: CPT | Performed by: PHYSICIAN ASSISTANT

## 2021-11-09 RX ORDER — METHYLPREDNISOLONE 4 MG/1
TABLET ORAL
Qty: 21 TABLET | Refills: 0 | Status: SHIPPED | OUTPATIENT
Start: 2021-11-09 | End: 2022-03-29

## 2021-11-09 RX ORDER — CYCLOBENZAPRINE HCL 10 MG
10 TABLET ORAL 3 TIMES DAILY PRN
Qty: 60 TABLET | Refills: 0 | Status: SHIPPED | OUTPATIENT
Start: 2021-11-09 | End: 2021-11-29

## 2021-11-09 ASSESSMENT — FIBROSIS 4 INDEX: FIB4 SCORE: 0.4

## 2021-11-09 NOTE — PROGRESS NOTES
Subjective:     CC: Follow-up shoulder and neck pain, paperwork    HPI:   Cervical fusion C5-C7 surgery June 2021 for cervical pain and right side cervical radiculopathy.  He feels that his right arm/shoulder has improved significantly. He has been doing Physical therapy as directed.  Currently, patient reports turning his head to the L and down causes L neck muscle tension with shooting pain in the neck down into the shoulder. Unable to carry >15lbs with L arm.  He is unable to lift his left arm greater than 90 degrees.  Patient is right-handed    Cervical MRI dated 11/6/2021 shows degenerative disc disease but no significant spinal stenosis with prosthetic disc placement at C5-C6 and C6/C7.    Shoulder MRI dated 11/6/2021 shows no rotator cuff tendon tear with some small subchondral cysts    Pt thinks he was previously evaluated by pain management but is not sure when/who he saw and does not follow with them currently    Carpal Tunnel Surgery is pending insurance approval, although patient reports carpal tunnel symptoms on his right hand have improved significantly status post surgery.     Leave of absence from work ends 11/26/2021.  Patient would like to return to work at that date but with restrictions.    ROS:  Gen: no fevers/chills  Pulm: no sob, no cough  CV: no chest pain  GI: no nausea/vomiting, no diarrhea  MSk: + myalgias  Skin: no rash  Neuro: + headaches    Current Outpatient Medications Ordered in Epic   Medication Sig Dispense Refill   • cyclobenzaprine (FLEXERIL) 10 mg Tab Take 1 Tablet by mouth 3 times a day as needed for up to 20 days. 60 Tablet 0   • methylPREDNISolone (MEDROL DOSEPAK) 4 MG Tablet Therapy Pack As directed on the packaging label. 21 Tablet 0   • lamotrigine (LAMICTAL) 150 MG tablet Take 150 mg by mouth.     • doxazosin (CARDURA) 2 MG Tab Take 1 Tablet by mouth at bedtime. 90 Tablet 1   • gabapentin (NEURONTIN) 300 MG Cap Take 1 Capsule by mouth 3 times a day. 90 Capsule 1   •  "oxyCODONE immediate-release (ROXICODONE) 5 MG Tab Take 5 mg by mouth.     • famotidine (PEPCID) 20 MG Tab Take 20 mg by mouth.     • verapamil ER (CALAN-SR) 240 MG Tab CR Take 1 tablet by mouth every day for 360 days. 90 tablet 3   • acetaminophen (TYLENOL) 500 MG Tab Take 2 Tabs by mouth 3 times a day as needed (pain.  Do not exceed 3000 mg of total acetaminophen per day). 180 Tab 6   • buPROPion (WELLBUTRIN XL) 150 MG XL tablet Take 1 Tab by mouth 2 Times a Day. 90 Tab 0   • lithium CR (ESKALITH CR) 450 MG Tab CR TAKE 1 TABLET BY MOUTH TWICE DAILY 180 Tab 1     No current Epic-ordered facility-administered medications on file.       Objective:     Exam:  /92   Pulse 96   Temp 37.1 °C (98.8 °F)   Resp 20   Ht 1.905 m (6' 3\")   Wt (!) 138 kg (305 lb)   SpO2 95%   BMI 38.12 kg/m²  Body mass index is 38.12 kg/m².    Gen: Alert and oriented, No apparent distress.  Neck: Neck is supple without lymphadenopathy.  Lungs: Normal effort, CTA bilaterally, no wheezes, rhonchi, or rales  CV: Regular rate and rhythm. No murmurs, rubs, or gallops.  Ext: No clubbing, cyanosis, edema.  Left shoulder: Unable to extend arm past 90 degrees with passive or active movement, full strength, empty can test positive, drop arm test negative, Hawkin's positive  Right shoulder exam within normal limits  Wrist/Hand: Patient reports slightly decreased tactile sensation on the palmar aspect proximal to the fifth digit no deformity, swelling or bruising noted. Tenderness to palpation negative. No tenderness at snuffbox. Range of motion intact. Strength and sensation intact.  Good  strength. 2+ radial pulse. Finkelstein's test: Negative      Assessment & Plan:     46 y.o. male with the following -     1. Cervical radiculopathy  Acute on chronic, stable  Patient has recovered from his previous cervical spine surgery does not appear to have any significant deficits in the right arm.  He has subsequently developed decreased range of " motion, shoulder pain, and slightly decreased tactile sensation in the left upper extremity.  Patient is okay to return to work with appropriate restrictions in place, including no overhead lifting and no lifting greater than 15 pounds.    Plan:    - Labs/tests: EMG of left upper extremity ordered, reviewed MRI results with patient today    - Treatments: Continue with physical therapy, trial of Medrol Dosepak and cyclobenzaprine 10 mg 3 times daily as needed.  Patient also try over-the-counter diclofenac gel    - Referrals: Pain management    Paperwork completed and returned to patient    - F/U Timing & Contingencies: discussed red flag symptoms and indications for return/ER      I spent a total of 30 minutes with record review, exam, communication with the patient, communication with other providers, and documentation of this encounter.      Return if symptoms worsen or fail to improve.    Please note that this dictation was created using voice recognition software. I have made every reasonable attempt to correct obvious errors, but there may be errors of grammar and possibly content that I did not discover before finalizing the note.

## 2021-12-10 ENCOUNTER — DOCUMENTATION (OUTPATIENT)
Dept: VASCULAR LAB | Facility: MEDICAL CENTER | Age: 46
End: 2021-12-10

## 2021-12-10 NOTE — PROGRESS NOTES
Called and left VM for pt to call back to get scheduled for overdue vascular follow up with a vascular APRN.

## 2021-12-15 ENCOUNTER — TELEPHONE (OUTPATIENT)
Dept: PHYSICAL THERAPY | Facility: REHABILITATION | Age: 46
End: 2021-12-15

## 2021-12-15 ENCOUNTER — DOCUMENTATION (OUTPATIENT)
Dept: MEDICAL GROUP | Facility: LAB | Age: 46
End: 2021-12-15

## 2021-12-15 NOTE — OP THERAPY DISCHARGE SUMMARY
Outpatient Physical Therapy  DISCHARGE SUMMARY NOTE      [facility]  [address], [address]  [place] [address]  Phone:  [phone]  Fax:  [phone]    Date of Visit: 12/15/2021    Patient: Chaitanya Kelly  YOB: 1975  MRN: [id number]     Referring Provider: Roderick Best M.D.   Referring Diagnosis Neuralgia [M79.2];Chronic pain of right upper extremity [M79.601, G89.29];Numbness and tingling of right hand [R20.0, R20.2];Right elbow pain [M25.521];Chronic right shoulder pain [M25.511, G89.29];Neck pain [M54.2];Cervical radiculopathy [M54.12];Biceps tendinitis of right upper extremity [M75.21];Right carpal tunnel syndrome [G56.01];Right lateral epicondylitis [M77.11]        Your patient is being discharged from Physical Therapy with the following comments:   · Goals not met  · Lapse of care > 30 days    Comments:  Chaitanya Kelly has been discharged due to a lapse in care greater than 30 days. Thank you for the opportunity to assist you and your patient.      Limitations Remaining:  At last session continued reported numbness and tingling without improvement    Recommendations:  F/u with physician as needed    Ty Craig, PT, DPT    Date: 12/15/2021

## 2021-12-15 NOTE — PROGRESS NOTES
Patient requires medical accommodation form to specify that these restrictions apply to his left arm only.  I have created an addendum, and additionally documented on the accommodation form clarifying this.

## 2021-12-15 NOTE — LETTER
December 15, 2021    To Whom It May Concern,    Chaitanya Kelly (1975) is a patient of the above medical practice.  Patient has a documented history of chronic neck pain, cervical radiculopathy and is status post neck surgery.  Patient has difficulty utilizing his left arm due to cervical radiculopathy.  As such, his accommodation recommendations are as follows:  1: To be in an employment position that does not require overhead lifting of his left arm  2: To be in an employment position that does not require lifting greater than 15 pounds with his left arm    Patient has no documented restrictions of the use of his right arm.    Please do not hesitate to contact my office if you have any additional questions.    Regards,                        Ebony Rojas P.A.-C.

## 2022-01-11 ENCOUNTER — HOSPITAL ENCOUNTER (OUTPATIENT)
Facility: MEDICAL CENTER | Age: 47
End: 2022-01-11
Attending: NURSE PRACTITIONER
Payer: COMMERCIAL

## 2022-01-11 ENCOUNTER — OFFICE VISIT (OUTPATIENT)
Dept: MEDICAL GROUP | Facility: LAB | Age: 47
End: 2022-01-11
Payer: COMMERCIAL

## 2022-01-11 VITALS
HEIGHT: 73 IN | SYSTOLIC BLOOD PRESSURE: 136 MMHG | OXYGEN SATURATION: 96 % | HEART RATE: 86 BPM | BODY MASS INDEX: 39.76 KG/M2 | TEMPERATURE: 98.6 F | DIASTOLIC BLOOD PRESSURE: 68 MMHG | WEIGHT: 300 LBS | RESPIRATION RATE: 14 BRPM

## 2022-01-11 DIAGNOSIS — J06.9 UPPER RESPIRATORY TRACT INFECTION, UNSPECIFIED TYPE: ICD-10-CM

## 2022-01-11 PROCEDURE — 99213 OFFICE O/P EST LOW 20 MIN: CPT | Performed by: NURSE PRACTITIONER

## 2022-01-11 PROCEDURE — U0003 INFECTIOUS AGENT DETECTION BY NUCLEIC ACID (DNA OR RNA); SEVERE ACUTE RESPIRATORY SYNDROME CORONAVIRUS 2 (SARS-COV-2) (CORONAVIRUS DISEASE [COVID-19]), AMPLIFIED PROBE TECHNIQUE, MAKING USE OF HIGH THROUGHPUT TECHNOLOGIES AS DESCRIBED BY CMS-2020-01-R: HCPCS

## 2022-01-11 PROCEDURE — U0005 INFEC AGEN DETEC AMPLI PROBE: HCPCS

## 2022-01-11 ASSESSMENT — FIBROSIS 4 INDEX: FIB4 SCORE: 0.4

## 2022-01-11 NOTE — PROGRESS NOTES
"Subjective:     CC: The encounter diagnosis was Upper respiratory tract infection, unspecified type.    HPI:   Chaitanya presents today with the following:    Upper respiratory infection  Onset 01/02/2022. Current symptoms include fatigue, sore throat, nausea, diarrhea, shortness of breath (he has a history of asthma), migraines.   His symptoms have improved in the last week, when he had fever, congestion, and dizziness. These symptoms have since resolved.   Denies cough, congestion, vomiting, wheezing, loss of taste/smell.  No known sick contacts.   Patient has been taking OTC tylenol for his migraines.     ROS:   As documented in history of present illness above    Objective:     Exam: /68 (BP Location: Right arm, Patient Position: Sitting, BP Cuff Size: Large adult)   Pulse 86   Temp 37 °C (98.6 °F)   Resp 14   Ht 1.854 m (6' 1\")   Wt (!) 136 kg (300 lb)   SpO2 96%  Body mass index is 39.58 kg/m².    Constitutional: Alert, no distress, plus 3 vital signs  Skin:  Warm, dry, no rashes invisible areas  Eye: Equal, round and reactive, conjunctiva clear  ENMT: Bilateral tympanic membranes are retracted but translucent without erythema or perforation. Positive bilateral maxillary sinus tenderness. Oropharynx is slightly injected without exudate. No tonsillar enlargement.    Neck: Mild anterior cervical, nontender lymphadenopathy  Respiratory: Unlabored respiration, lungs clear to auscultation, no wheezes, no rhonchi  Cardiovascular: Normal rate and rhythm  Psych: Alert, pleasant, well-groomed, normal affect    Assessment & Plan:     46 y.o. male with the following -     1. Upper respiratory tract infection, unspecified type  Covid swab sent out for testing, will contact patient with results.  Notified patient it could take anywhere from 2 to 7 days for results to come back.  Discussed worsening symptoms or new symptoms and ER follow-up if needed.  Patient to follow employer (if employed), county, local, state, " national guidelines for protection.  Patient to contact the county for recommendations and written notes about clearing from Covid and returning to work. Patient can take over-the-counter Tylenol for pain, fever, discomfort, and other over-the-counter medications for symptom relief.  All questions answered.  - SARS-CoV-2 PCR (24 hour In-House): Collect NP swab in VTM; Future

## 2022-01-12 DIAGNOSIS — J06.9 UPPER RESPIRATORY TRACT INFECTION, UNSPECIFIED TYPE: ICD-10-CM

## 2022-01-12 LAB
COVID ORDER STATUS COVID19: NORMAL
SARS-COV-2 RNA RESP QL NAA+PROBE: NOTDETECTED
SPECIMEN SOURCE: NORMAL

## 2022-01-17 DIAGNOSIS — M54.12 CERVICAL RADICULOPATHY: ICD-10-CM

## 2022-01-18 RX ORDER — GABAPENTIN 300 MG/1
CAPSULE ORAL
Qty: 60 CAPSULE | Refills: 0 | Status: SHIPPED | OUTPATIENT
Start: 2022-01-18 | End: 2022-02-22

## 2022-01-18 NOTE — TELEPHONE ENCOUNTER
Received request via: Pharmacy  1/11/2022lov  Was the patient seen in the last year in this department? Yes    Does the patient have an active prescription (recently filled or refills available) for medication(s) requested? No

## 2022-01-30 ENCOUNTER — OFFICE VISIT (OUTPATIENT)
Dept: URGENT CARE | Facility: PHYSICIAN GROUP | Age: 47
End: 2022-01-30
Payer: COMMERCIAL

## 2022-01-30 VITALS
DIASTOLIC BLOOD PRESSURE: 82 MMHG | HEIGHT: 74 IN | SYSTOLIC BLOOD PRESSURE: 158 MMHG | WEIGHT: 306 LBS | HEART RATE: 76 BPM | RESPIRATION RATE: 18 BRPM | TEMPERATURE: 99 F | BODY MASS INDEX: 39.27 KG/M2 | OXYGEN SATURATION: 96 %

## 2022-01-30 DIAGNOSIS — H10.32 ACUTE BACTERIAL CONJUNCTIVITIS OF LEFT EYE: ICD-10-CM

## 2022-01-30 PROCEDURE — 99213 OFFICE O/P EST LOW 20 MIN: CPT | Performed by: NURSE PRACTITIONER

## 2022-01-30 RX ORDER — POLYMYXIN B SULFATE AND TRIMETHOPRIM 1; 10000 MG/ML; [USP'U]/ML
1 SOLUTION OPHTHALMIC EVERY 4 HOURS
Qty: 10 ML | Refills: 0 | Status: SHIPPED | OUTPATIENT
Start: 2022-01-30 | End: 2022-02-04

## 2022-01-30 ASSESSMENT — PAIN SCALES - GENERAL: PAINLEVEL: 6=MODERATE PAIN

## 2022-01-30 ASSESSMENT — ENCOUNTER SYMPTOMS
EYE REDNESS: 1
EYE DISCHARGE: 1

## 2022-01-30 ASSESSMENT — FIBROSIS 4 INDEX: FIB4 SCORE: 0.4

## 2022-01-30 NOTE — LETTER
January 30, 2022    To Whom It May Concern:         This is confirmation that Chaitanya CARROL Kelly attended his scheduled appointment with NICHOL Garcia on 1/30/22.         If you have any questions please do not hesitate to call me at the phone number listed below.    Sincerely,      RESHMA Garcia.  [phone]

## 2022-01-31 NOTE — PROGRESS NOTES
Subjective     Chaitanya Kelly is a 46 y.o. male who presents with Eye Problem (left eye drainage onset this am)            Eye Problem   The left eye is affected. This is a new problem. Episode onset: pt reports new onset of left eye redness and drainage that started this am. He states he thinks his CPAP slipped up over his eyes at night which may have caused this.  The problem has been unchanged. There is no known exposure to pink eye. He does not wear contacts. Associated symptoms include an eye discharge and eye redness. He has tried nothing for the symptoms.       Review of Systems   Eyes: Positive for discharge and redness.   All other systems reviewed and are negative.         Past Medical History:   Diagnosis Date   • Anxiety    • Arthritis     Neck   • ASTHMA    • Bipolar 2 disorder     Manic depression   • Bipolar disorder (HCC)     depression and anxiety   • Headache(784.0)    • Hypertension    • Migraine    • TOBI (obstructive sleep apnea)     Uses CPAP   • Pain 10/10/2017    umbilical pain   • Seizure (HCC) 1980    related to Ritalin. 4/2/21: No known seizure since 1980.   • Snoring       Past Surgical History:   Procedure Laterality Date   • CERVICAL DISK AND FUSION ANTERIOR  06/10/2021   • BLOCK EPIDURAL STEROID INJECTION N/A 12/11/2020    Procedure: INJECTION, STEROID, EPIDURAL;  Surgeon: Roderick Best M.D.;  [facility];  Service: Pain Management   • UMBILICAL HERNIA REPAIR N/A 10/18/2017    Procedure: UMBILICAL HERNIA REPAIR;  Surgeon: Chinedu Kamara M.D.;  Location: [facility];  Service: General   • LAMINOTOMY     • OTHER     • UMBILICAL HERNIA REPAIR        Social History     Socioeconomic History   • Marital status:      Spouse name: Not on file   • Number of children: Not on file   • Years of education: Not on file   • Highest education level: Not on file   Occupational History   • Not on file   Tobacco Use   • Smoking status: Former Smoker      Types: Cigarettes     Quit date: 2006     Years since quittin.0   • Smokeless tobacco: Never Used   • Tobacco comment: off and on from age [age]   Vaping Use   • Vaping Use: Never used   Substance and Sexual Activity   • Alcohol use: No   • Drug use: No   • Sexual activity: Yes     Partners: Female   Other Topics Concern   •  Service No   • Blood Transfusions No   • Caffeine Concern No   • Occupational Exposure No   • Hobby Hazards No   • Sleep Concern Yes   • Stress Concern Yes   • Weight Concern Yes   • Special Diet No   • Back Care No   • Exercise No   • Bike Helmet No   • Seat Belt Yes   • Self-Exams No   Social History Narrative   • Not on file     Social Determinants of Health     Financial Resource Strain:    • Difficulty of Paying Living Expenses: Not on file   Food Insecurity:    • Worried About Running Out of Food in the Last Year: Not on file   • Ran Out of Food in the Last Year: Not on file   Transportation Needs:    • Lack of Transportation (Medical): Not on file   • Lack of Transportation (Non-Medical): Not on file   Physical Activity:    • Days of Exercise per Week: Not on file   • Minutes of Exercise per Session: Not on file   Stress:    • Feeling of Stress : Not on file   Social Connections:    • Frequency of Communication with Friends and Family: Not on file   • Frequency of Social Gatherings with Friends and Family: Not on file   • Attends Church Services: Not on file   • Active Member of Clubs or Organizations: Not on file   • Attends Club or Organization Meetings: Not on file   • Marital Status: Not on file   Intimate Partner Violence:    • Fear of Current or Ex-Partner: Not on file   • Emotionally Abused: Not on file   • Physically Abused: Not on file   • Sexually Abused: Not on file   Housing Stability:    • Unable to Pay for Housing in the Last Year: Not on file   • Number of Places Lived in the Last Year: Not on file   • Unstable Housing in the Last Year: Not on file  "        Objective     /82   Pulse 76   Temp 37.2 °C (99 °F)   Resp 18   Ht 1.88 m (6' 2\")   Wt (!) 139 kg (306 lb)   SpO2 96%   BMI 39.29 kg/m²      Physical Exam  Vitals and nursing note reviewed.   Constitutional:       Appearance: Normal appearance.   HENT:      Head: Normocephalic and atraumatic.      Nose: Nose normal.      Mouth/Throat:      Mouth: Mucous membranes are moist.      Pharynx: Oropharynx is clear.   Eyes:      General: Lids are normal.         Left eye: Discharge present.     Extraocular Movements: Extraocular movements intact.      Conjunctiva/sclera:      Left eye: Left conjunctiva is injected.      Pupils: Pupils are equal, round, and reactive to light.   Cardiovascular:      Rate and Rhythm: Normal rate and regular rhythm.   Pulmonary:      Effort: Pulmonary effort is normal.   Musculoskeletal:         General: Normal range of motion.      Cervical back: Normal range of motion and neck supple.   Skin:     General: Skin is warm and dry.      Capillary Refill: Capillary refill takes less than 2 seconds.   Neurological:      General: No focal deficit present.      Mental Status: He is alert and oriented to person, place, and time. Mental status is at baseline.   Psychiatric:         Mood and Affect: Mood normal.         Thought Content: Thought content normal.         Judgment: Judgment normal.                             Assessment & Plan        1. Acute bacterial conjunctivitis of left eye  - polymixin-trimethoprim (POLYTRIM) 93681-1.1 UNIT/ML-% Solution; Administer 1 Drop into the left eye every 4 hours for 5 days.  Dispense: 10 mL; Refill: 0       Warm compresses to affected eye(s) 3 times daily  Hand hygiene discussed  Wash all recently used linens and towels in hot water  Do not touch dropper to eye (s)  I followed all reasonable PPE precautions during this encounter including but not limited to use of an N95 mask, gloves, and gown if indicated.    Supportive care, differential " diagnoses, and indications for immediate follow-up discussed with patient.    Pathogenesis of diagnosis discussed including typical length and natural progression.      Instructed to return to UC or nearest emergency department if symptoms fail to improve, for any change in condition, further concerns, or new concerning symptoms.  Patient states understanding of the plan of care and discharge instructions.

## 2022-02-21 DIAGNOSIS — M54.12 CERVICAL RADICULOPATHY: ICD-10-CM

## 2022-02-22 RX ORDER — GABAPENTIN 300 MG/1
CAPSULE ORAL
Qty: 60 CAPSULE | Refills: 0 | Status: SHIPPED | OUTPATIENT
Start: 2022-02-22 | End: 2022-03-25

## 2022-02-22 NOTE — TELEPHONE ENCOUNTER
Received request via: Pharmacy    Was the patient seen in the last year in this department? Yes  LOV 01/11/2022  Does the patient have an active prescription (recently filled or refills available) for medication(s) requested? No

## 2022-03-03 ENCOUNTER — HOSPITAL ENCOUNTER (OUTPATIENT)
Facility: MEDICAL CENTER | Age: 47
End: 2022-03-03
Attending: NURSE PRACTITIONER
Payer: COMMERCIAL

## 2022-03-03 ENCOUNTER — OFFICE VISIT (OUTPATIENT)
Dept: URGENT CARE | Facility: PHYSICIAN GROUP | Age: 47
End: 2022-03-03
Payer: COMMERCIAL

## 2022-03-03 VITALS
HEART RATE: 90 BPM | DIASTOLIC BLOOD PRESSURE: 90 MMHG | OXYGEN SATURATION: 97 % | SYSTOLIC BLOOD PRESSURE: 146 MMHG | TEMPERATURE: 99.7 F | WEIGHT: 308 LBS | RESPIRATION RATE: 15 BRPM | HEIGHT: 73 IN | BODY MASS INDEX: 40.82 KG/M2

## 2022-03-03 DIAGNOSIS — H57.9 ITCH OF EYE: ICD-10-CM

## 2022-03-03 DIAGNOSIS — J06.9 UPPER RESPIRATORY TRACT INFECTION, UNSPECIFIED TYPE: ICD-10-CM

## 2022-03-03 DIAGNOSIS — J02.9 PHARYNGITIS, UNSPECIFIED ETIOLOGY: ICD-10-CM

## 2022-03-03 DIAGNOSIS — H10.13 ALLERGIC CONJUNCTIVITIS OF BOTH EYES: ICD-10-CM

## 2022-03-03 DIAGNOSIS — Z20.822 EXPOSURE TO COVID-19 VIRUS: ICD-10-CM

## 2022-03-03 LAB
INT CON NEG: NORMAL
INT CON POS: NORMAL
S PYO AG THROAT QL: NEGATIVE

## 2022-03-03 PROCEDURE — 87880 STREP A ASSAY W/OPTIC: CPT | Performed by: NURSE PRACTITIONER

## 2022-03-03 PROCEDURE — U0003 INFECTIOUS AGENT DETECTION BY NUCLEIC ACID (DNA OR RNA); SEVERE ACUTE RESPIRATORY SYNDROME CORONAVIRUS 2 (SARS-COV-2) (CORONAVIRUS DISEASE [COVID-19]), AMPLIFIED PROBE TECHNIQUE, MAKING USE OF HIGH THROUGHPUT TECHNOLOGIES AS DESCRIBED BY CMS-2020-01-R: HCPCS

## 2022-03-03 PROCEDURE — U0005 INFEC AGEN DETEC AMPLI PROBE: HCPCS

## 2022-03-03 PROCEDURE — 99214 OFFICE O/P EST MOD 30 MIN: CPT | Mod: CS | Performed by: NURSE PRACTITIONER

## 2022-03-03 RX ORDER — VERAPAMIL HYDROCHLORIDE 240 MG/1
240 TABLET, FILM COATED, EXTENDED RELEASE ORAL DAILY
COMMUNITY
End: 2022-03-07

## 2022-03-03 RX ORDER — KETOTIFEN FUMARATE 0.25 MG/ML
1 SOLUTION/ DROPS OPHTHALMIC 2 TIMES DAILY
Qty: 5 ML | Refills: 0 | Status: SHIPPED | OUTPATIENT
Start: 2022-03-03 | End: 2022-03-29

## 2022-03-03 ASSESSMENT — ENCOUNTER SYMPTOMS
RESPIRATORY NEGATIVE: 1
WEAKNESS: 0
SENSORY CHANGE: 0
HEADACHES: 1
EYE REDNESS: 0
SORE THROAT: 1
EYE DISCHARGE: 1
EYE PAIN: 0
BLURRED VISION: 0
FEVER: 0
NAUSEA: 1

## 2022-03-03 ASSESSMENT — FIBROSIS 4 INDEX: FIB4 SCORE: 0.4

## 2022-03-03 ASSESSMENT — VISUAL ACUITY: OU: 1

## 2022-03-04 DIAGNOSIS — J02.9 PHARYNGITIS, UNSPECIFIED ETIOLOGY: ICD-10-CM

## 2022-03-04 LAB — COVID ORDER STATUS COVID19: NORMAL

## 2022-03-04 NOTE — PATIENT INSTRUCTIONS
Upper Respiratory Infection, Adult  An upper respiratory infection (URI) is a common viral infection of the nose, throat, and upper air passages that lead to the lungs. The most common type of URI is the common cold. URIs usually get better on their own, without medical treatment.  What are the causes?  A URI is caused by a virus. You may catch a virus by:  · Breathing in droplets from an infected person's cough or sneeze.  · Touching something that has been exposed to the virus (contaminated) and then touching your mouth, nose, or eyes.  What increases the risk?  You are more likely to get a URI if:  · You are very young or very old.  · It is valery or winter.  · You have close contact with others, such as at a , school, or health care facility.  · You smoke.  · You have long-term (chronic) heart or lung disease.  · You have a weakened disease-fighting (immune) system.  · You have nasal allergies or asthma.  · You are experiencing a lot of stress.  · You work in an area that has poor air circulation.  · You have poor nutrition.  What are the signs or symptoms?  A URI usually involves some of the following symptoms:  · Runny or stuffy (congested) nose.  · Sneezing.  · Cough.  · Sore throat.  · Headache.  · Fatigue.  · Fever.  · Loss of appetite.  · Pain in your forehead, behind your eyes, and over your cheekbones (sinus pain).  · Muscle aches.  · Redness or irritation of the eyes.  · Pressure in the ears or face.  How is this diagnosed?  This condition may be diagnosed based on your medical history and symptoms, and a physical exam. Your health care provider may use a cotton swab to take a mucus sample from your nose (nasal swab). This sample can be tested to determine what virus is causing the illness.  How is this treated?  URIs usually get better on their own within 7-10 days. You can take steps at home to relieve your symptoms. Medicines cannot cure URIs, but your health care provider may recommend  certain medicines to help relieve symptoms, such as:  · Over-the-counter cold medicines.  · Cough suppressants. Coughing is a type of defense against infection that helps to clear the respiratory system, so take these medicines only as recommended by your health care provider.  · Fever-reducing medicines.  Follow these instructions at home:  Activity  · Rest as needed.  · If you have a fever, stay home from work or school until your fever is gone or until your health care provider says you are no longer contagious. Your health care provider may have you wear a face mask to prevent your infection from spreading.  Relieving symptoms  · Gargle with a salt-water mixture 3-4 times a day or as needed. To make a salt-water mixture, completely dissolve ½-1 tsp of salt in 1 cup of warm water.  · Use a cool-mist humidifier to add moisture to the air. This can help you breathe more easily.  Eating and drinking    · Drink enough fluid to keep your urine pale yellow.  · Eat soups and other clear broths.  General instructions    · Take over-the-counter and prescription medicines only as told by your health care provider. These include cold medicines, fever reducers, and cough suppressants.  · Do not use any products that contain nicotine or tobacco, such as cigarettes and e-cigarettes. If you need help quitting, ask your health care provider.  · Stay away from secondhand smoke.  · Stay up to date on all immunizations, including the yearly (annual) flu vaccine.  · Keep all follow-up visits as told by your health care provider. This is important.  How to prevent the spread of infection to others    · URIs can be passed from person to person (are contagious). To prevent the infection from spreading:  ? Wash your hands often with soap and water. If soap and water are not available, use hand .  ? Avoid touching your mouth, face, eyes, or nose.  ? Cough or sneeze into a tissue or your sleeve or elbow instead of into your hand  or into the air.  Contact a health care provider if:  · You are getting worse instead of better.  · You have a fever or chills.  · Your mucus is brown or red.  · You have yellow or brown discharge coming from your nose.  · You have pain in your face, especially when you bend forward.  · You have swollen neck glands.  · You have pain while swallowing.  · You have white areas in the back of your throat.  Get help right away if:  · You have shortness of breath that gets worse.  · You have severe or persistent:  ? Headache.  ? Ear pain.  ? Sinus pain.  ? Chest pain.  · You have chronic lung disease along with any of the following:  ? Wheezing.  ? Prolonged cough.  ? Coughing up blood.  ? A change in your usual mucus.  · You have a stiff neck.  · You have changes in your:  ? Vision.  ? Hearing.  ? Thinking.  ? Mood.  Summary  · An upper respiratory infection (URI) is a common infection of the nose, throat, and upper air passages that lead to the lungs.  · A URI is caused by a virus.  · URIs usually get better on their own within 7-10 days.  · Medicines cannot cure URIs, but your health care provider may recommend certain medicines to help relieve symptoms.  This information is not intended to replace advice given to you by your health care provider. Make sure you discuss any questions you have with your health care provider.  Document Released: 06/13/2002 Document Revised: 12/26/2019 Document Reviewed: 08/03/2018  [employer] Patient Education © 2020 [employer]      COVID-19 PCR test pending. Patient is advised to self-isolate as recommended by the CDC.    • Children and adults with mild, symptomatic COVID-19: Isolation can end at least 5 days after symptom onset and after fever ends for 24 hours (without the use of fever-reducing medication) and symptoms are improving, if these people can continue to properly wear a well-fitted mask around others for 5 more days after the 5-day isolation period. Day 0 is the first day  of symptoms.  • People who are infected but asymptomatic (never develop symptoms): Isolation can end at least 5 days after the first positive test (with day 0 being the date their specimen was collected for the positive test), if these people can continue to wear a properly well-fitted mask around others for 5 more days after the 5-day isolation period. However, if symptoms develop after a positive test, their 5-day isolation period should start over (day 0 changes to the first day of symptoms).  • People who have moderate COVID-19 illness: Isolate for 10 days.  • People who are severely ill (i.e., requiring hospitalization, intensive care, or ventilation support): Extending the duration of isolation and precautions to at least 10 days and up to 20 days after symptom onset, and after fever ends (without the use of fever-reducing medication) and symptoms are improving, may be warranted.  • People who are moderately or severely immunocompromised might have a longer infectious period: Extend isolation to 20 or more days (day 0 is the first day of symptoms or a positive viral test). Use a test-based strategy and consult with an infectious disease specialist to determine the appropriate duration of isolation and precautions.  • Recovered patients: Patients who have recovered from COVID-19 can continue to have detectable SARS-CoV-2 RNA in upper respiratory specimens for up to 3 months after illness onset. However, replication-competent virus has not been reliably recovered from such patients, and they are not likely infectious.  • Available data suggest that patients with mild-to-moderate COVID-19 remain infectious no longer than 10 days after symptom onset.     Please visit https://www.cdc.gov/coronavirus/2019-ncov/hcp/duration-isolation.html for further information.           Allergic Conjunctivitis, Adult    Allergic conjunctivitis is inflammation of the clear membrane that covers the white part of your eye and the  inner surface of your eyelid (conjunctiva). The inflammation is caused by allergies. The blood vessels in the conjunctiva become inflamed and this causes the eyes to become red or pink. The eyes often feel itchy. Allergic conjunctivitis cannot be spread from one person to another person (is not contagious).  What are the causes?  This condition is caused by an allergic reaction. Common causes of an allergic reaction (allergens) include:  · Outdoor allergens, such as:  ? Pollen.  ? Grass and weeds.  ? Mold spores.  · Indoor allergens, such as:  ? Dust.  ? Smoke.  ? Mold.  ? Pet dander.  ? Animal hair.  What increases the risk?  You may be more likely to develop this condition if you have a family history of allergies, such as:  · Allergic rhinitis.  · Bronchial asthma.  · Atopic dermatitis.  What are the signs or symptoms?  Symptoms of this condition include eyes that are:  · Itchy.  · Red.  · Watery.  · Puffy.  Your eyes may also:  · Sting or burn.  · Have clear drainage coming from them.  How is this diagnosed?  This condition may be diagnosed by medical history and physical exam. If you have drainage from your eyes, it may be tested to rule out other causes of conjunctivitis. You may also need to see a health care provider who specializes in treating allergies (allergist) or eye conditions (ophthalmologist) for tests to confirm the diagnosis. You may have:  · Skin tests to see which allergens are causing your symptoms. These tests involve pricking the skin with a tiny needle and exposing the skin to small amounts of potential allergens to see if your skin reacts.  · Blood tests.  · Tissue scrapings from your eyelid. These will be examined under a microscope.  How is this treated?  Treatments for this condition may include:  · Cold cloths (compresses) to soothe itching and swelling.  · Washing the face to remove allergens.  · Eye drops. These may be prescription or over-the-counter. There are several different  types. You may need to try different types to see which one works best for you. Your may need:  ? Eye drops that block the allergic reaction (antihistamine).  ? Eye drops that reduce swelling and irritation (anti-inflammatory).  ? Steroid eye drops to lessen a severe reaction (vernal conjunctivitis).  · Oral antihistamine medicines to reduce your allergic reaction. You may need these if eye drops do not help or are difficult to use.  Follow these instructions at home:  · Avoid known allergens whenever possible.  · Take or apply over-the-counter and prescription medicines only as told by your health care provider. These include any eye drops.  · Apply a cool, clean washcloth to your eye for 10-20 minutes, 3-4 times a day.  · Do not touch or rub your eyes.  · Do not wear contact lenses until the inflammation is gone. Wear glasses instead.  · Do not wear eye makeup until the inflammation is gone.  · Keep all follow-up visits as told by your health care provider. This is important.  Contact a health care provider if:  · Your symptoms get worse or do not improve with treatment.  · You have mild eye pain.  · You have sensitivity to light.  · You have spots or blisters on your eyes.  · You have pus draining from your eye.  · You have a fever.  Get help right away if:  · You have redness, swelling, or other symptoms in only one eye.  · Your vision is blurred or you have vision changes.  · You have severe eye pain.  This information is not intended to replace advice given to you by your health care provider. Make sure you discuss any questions you have with your health care provider.  Document Released: 03/09/2004 Document Revised: 11/30/2018 Document Reviewed: 06/30/2017  [employer] Patient Education © 2020 [employer]

## 2022-03-04 NOTE — PROGRESS NOTES
Subjective:     Chaitanya Kelly is a 46 y.o. male who presents for Nausea (On and off nausea, sore throat, headache and runny nose. He also feels like he has reoccurring pink eye)       Nausea  This is a new problem. The problem has been gradually worsening. Associated symptoms include congestion, headaches, nausea and a sore throat. Pertinent negatives include no fever or weakness.     Patient reports 4 days ago, he started to develop a sore throat.  Started to develop a headache which resolved.  Started develop a runny nose 2 days ago.  Has some nausea.  Suspects possible allergies.  However, was exposed to Covid.  Reports wife tested positive for Covid 1 week ago.  Patient has been out of work.  Needs a Covid test.  Patient reports history of Covid 1 year ago with similar symptoms.    Also reports having had an episode of pink eye recently. Was treated with antibiotic eyedrops. Symptoms improved somewhat, however, still has persistent itchiness with clear discharge. No redness.    Unique test result dated 1/12/2022 reviewed: SARS-CoV-2 PCR negative    Unique test result dated 6/9/2021 reviewed: SARS-CoV-2 PCR negative    During this visit, appropriate PPE was worn, hand hygiene was performed, and the patient and any visitors were masked.     PMH:  has a past medical history of Anxiety, Arthritis, ASTHMA, Bipolar 2 disorder, Bipolar disorder (HCC), Headache(784.0), Hypertension, Migraine, TOBI (obstructive sleep apnea), Pain (10/10/2017), Seizure ([facility]) (1980), and Snoring.    MEDS:   Current Outpatient Medications:   •  verapamil ER (CALAN-SR) 240 MG Tab CR, Take 240 mg by mouth every day., Disp: , Rfl:   •  ketotifen (ZADITOR) 0.025 % ophthalmic solution, Administer 1 Drop into both eyes 2 times a day., Disp: 5 mL, Rfl: 0  •  gabapentin (NEURONTIN) 300 MG Cap, Take 1 capsule by mouth twice daily, Disp: 60 Capsule, Rfl: 0  •  lamotrigine (LAMICTAL) 150 MG tablet, Take 200 mg by mouth., Disp: , Rfl:   •   doxazosin (CARDURA) 2 MG Tab, Take 1 Tablet by mouth at bedtime., Disp: 90 Tablet, Rfl: 1  •  acetaminophen (TYLENOL) 500 MG Tab, Take 2 Tabs by mouth 3 times a day as needed (pain.  Do not exceed 3000 mg of total acetaminophen per day)., Disp: 180 Tab, Rfl: 6  •  buPROPion (WELLBUTRIN XL) 150 MG XL tablet, Take 1 Tab by mouth 2 Times a Day., Disp: 90 Tab, Rfl: 0  •  lithium CR (ESKALITH CR) 450 MG Tab CR, TAKE 1 TABLET BY MOUTH TWICE DAILY, Disp: 180 Tab, Rfl: 1  •  methylPREDNISolone (MEDROL DOSEPAK) 4 MG Tablet Therapy Pack, As directed on the packaging label. (Patient not taking: No sig reported), Disp: 21 Tablet, Rfl: 0  •  oxyCODONE immediate-release (ROXICODONE) 5 MG Tab, Take 5 mg by mouth. (Patient not taking: No sig reported), Disp: , Rfl:   •  famotidine (PEPCID) 20 MG Tab, Take 20 mg by mouth. (Patient not taking: Reported on 1/30/2022), Disp: , Rfl:     ALLERGIES:   Allergies   Allergen Reactions   • Ibuprofen Unspecified     Interaction with Lithium   • Latex Itching   • Naprosyn [Naproxen] Unspecified     Interaction with Lithium     SURGHX:   Past Surgical History:   Procedure Laterality Date   • CERVICAL DISK AND FUSION ANTERIOR  06/10/2021   • BLOCK EPIDURAL STEROID INJECTION N/A 12/11/2020    Procedure: INJECTION, STEROID, EPIDURAL;  Surgeon: Roderick Best M.D.;  Location: [facility];  Service: Pain Management   • UMBILICAL HERNIA REPAIR N/A 10/18/2017    Procedure: UMBILICAL HERNIA REPAIR;  Surgeon: Chinedu Kamara M.D.;  Location: SURGERY [facility];  Service: General   • LAMINOTOMY     • OTHER     • UMBILICAL HERNIA REPAIR       SOCHX:  reports that he quit smoking about 16 years ago. His smoking use included cigarettes. He has never used smokeless tobacco. He reports that he does not drink alcohol and does not use drugs.     FH: Reviewed with patient, not pertinent to this visit.    Review of Systems   Constitutional: Positive for malaise/fatigue. Negative for fever.  "  HENT: Positive for congestion and sore throat.    Eyes: Positive for discharge. Negative for blurred vision, pain and redness.        Itching   Respiratory: Negative.    Gastrointestinal: Positive for nausea.   Neurological: Positive for headaches. Negative for sensory change and weakness.   All other systems reviewed and are negative.    Additional details per HPI.      Objective:     /90   Pulse 90   Temp 37.6 °C (99.7 °F)   Resp 15   Ht 1.854 m (6' 1\")   Wt (!) 140 kg (308 lb)   SpO2 97%   BMI 40.64 kg/m²     Physical Exam  Vitals reviewed.   Constitutional:       General: He is not in acute distress.     Appearance: He is well-developed. He is not ill-appearing or toxic-appearing.   HENT:      Head: Normocephalic.      Right Ear: External ear normal.      Left Ear: External ear normal.      Nose: Nose normal.      Mouth/Throat:      Mouth: Mucous membranes are moist.      Pharynx: Uvula midline. Pharyngeal swelling and posterior oropharyngeal erythema present.   Eyes:      General: Lids are normal. Vision grossly intact.         Right eye: No discharge.         Left eye: No discharge.      Extraocular Movements: Extraocular movements intact.      Conjunctiva/sclera: Conjunctivae normal.      Right eye: Right conjunctiva is not injected.      Left eye: Left conjunctiva is not injected.      Pupils: Pupils are equal, round, and reactive to light.   Cardiovascular:      Rate and Rhythm: Normal rate.   Pulmonary:      Effort: Pulmonary effort is normal. No respiratory distress.   Musculoskeletal:         General: No deformity. Normal range of motion.      Cervical back: Normal range of motion.   Skin:     General: Skin is warm and dry.      Coloration: Skin is not pale.   Neurological:      Mental Status: He is alert and oriented to person, place, and time.      Sensory: No sensory deficit.      Motor: No weakness.   Psychiatric:         Behavior: Behavior normal. Behavior is cooperative.     Rapid " Strep A swab: negative      Assessment/Plan:     1. Pharyngitis, unspecified etiology  - COVID/SARS CoV-2 PCR; Future  - POCT Rapid Strep A    2. Upper respiratory tract infection, unspecified type    3. Exposure to COVID-19 virus    4. Itch of eye  - ketotifen (ZADITOR) 0.025 % ophthalmic solution; Administer 1 Drop into both eyes 2 times a day.  Dispense: 5 mL; Refill: 0    5. Allergic conjunctivitis of both eyes  - ketotifen (ZADITOR) 0.025 % ophthalmic solution; Administer 1 Drop into both eyes 2 times a day.  Dispense: 5 mL; Refill: 0    Discussed likely self-limiting viral etiology and expected course and duration of illness. Vital signs stable, afebrile, no acute distress at this time.    Differential diagnosis, natural history, supportive care, rest, fluids, gargles, over-the-counter symptom management per 's instructions, ibuprofen, APAP, close monitoring, and indications for immediate follow-up discussed. OTC list given.    Suspect allergic conjunctivitis. Rx as above sent electronically.    COVID-19 PCR test pending. Patient is advised to self-isolate as recommended by the CDC.    • Children and adults with mild, symptomatic COVID-19: Isolation can end at least 5 days after symptom onset and after fever ends for 24 hours (without the use of fever-reducing medication) and symptoms are improving, if these people can continue to properly wear a well-fitted mask around others for 5 more days after the 5-day isolation period. Day 0 is the first day of symptoms.  • People who are infected but asymptomatic (never develop symptoms): Isolation can end at least 5 days after the first positive test (with day 0 being the date their specimen was collected for the positive test), if these people can continue to wear a properly well-fitted mask around others for 5 more days after the 5-day isolation period. However, if symptoms develop after a positive test, their 5-day isolation period should start over  (day 0 changes to the first day of symptoms).  • People who have moderate COVID-19 illness: Isolate for 10 days.  • People who are severely ill (i.e., requiring hospitalization, intensive care, or ventilation support): Extending the duration of isolation and precautions to at least 10 days and up to 20 days after symptom onset, and after fever ends (without the use of fever-reducing medication) and symptoms are improving, may be warranted.  • People who are moderately or severely immunocompromised might have a longer infectious period: Extend isolation to 20 or more days (day 0 is the first day of symptoms or a positive viral test). Use a test-based strategy and consult with an infectious disease specialist to determine the appropriate duration of isolation and precautions.  • Recovered patients: Patients who have recovered from COVID-19 can continue to have detectable SARS-CoV-2 RNA in upper respiratory specimens for up to 3 months after illness onset. However, replication-competent virus has not been reliably recovered from such patients, and they are not likely infectious.  • Available data suggest that patients with mild-to-moderate COVID-19 remain infectious no longer than 10 days after symptom onset.     Please visit https://www.cdc.gov/coronavirus/2019-ncov/hcp/duration-isolation.html for further information.     All questions answered. Patient agrees with the plan of care.    Discharge summary provided.

## 2022-03-05 LAB
SARS-COV-2 RNA RESP QL NAA+PROBE: NOTDETECTED
SPECIMEN SOURCE: NORMAL

## 2022-03-07 RX ORDER — VERAPAMIL HYDROCHLORIDE 240 MG/1
TABLET, FILM COATED, EXTENDED RELEASE ORAL
Qty: 30 TABLET | Refills: 1 | Status: SHIPPED | OUTPATIENT
Start: 2022-03-07 | End: 2022-05-05 | Stop reason: SDUPTHER

## 2022-03-25 DIAGNOSIS — M54.12 CERVICAL RADICULOPATHY: ICD-10-CM

## 2022-03-25 RX ORDER — GABAPENTIN 300 MG/1
CAPSULE ORAL
Qty: 60 CAPSULE | Refills: 0 | Status: SHIPPED | OUTPATIENT
Start: 2022-03-25 | End: 2022-04-25 | Stop reason: SDUPTHER

## 2022-03-29 ENCOUNTER — HOSPITAL ENCOUNTER (OUTPATIENT)
Facility: MEDICAL CENTER | Age: 47
End: 2022-03-29
Attending: FAMILY MEDICINE
Payer: COMMERCIAL

## 2022-03-29 ENCOUNTER — OFFICE VISIT (OUTPATIENT)
Dept: URGENT CARE | Facility: PHYSICIAN GROUP | Age: 47
End: 2022-03-29
Payer: COMMERCIAL

## 2022-03-29 VITALS
TEMPERATURE: 98.9 F | WEIGHT: 309 LBS | HEART RATE: 78 BPM | SYSTOLIC BLOOD PRESSURE: 156 MMHG | DIASTOLIC BLOOD PRESSURE: 86 MMHG | BODY MASS INDEX: 40.95 KG/M2 | RESPIRATION RATE: 16 BRPM | HEIGHT: 73 IN | OXYGEN SATURATION: 96 %

## 2022-03-29 DIAGNOSIS — R03.0 ELEVATED BLOOD PRESSURE READING: ICD-10-CM

## 2022-03-29 DIAGNOSIS — Z03.818 ENCOUNTER FOR PATIENT CONCERN ABOUT EXPOSURE TO INFECTIOUS ORGANISM: ICD-10-CM

## 2022-03-29 PROBLEM — L60.0 INGROWN TOENAIL OF BOTH FEET: Status: RESOLVED | Noted: 2020-10-28 | Resolved: 2022-03-29

## 2022-03-29 PROBLEM — Z09 FOLLOW UP: Status: RESOLVED | Noted: 2021-10-07 | Resolved: 2022-03-29

## 2022-03-29 PROCEDURE — U0005 INFEC AGEN DETEC AMPLI PROBE: HCPCS

## 2022-03-29 PROCEDURE — U0003 INFECTIOUS AGENT DETECTION BY NUCLEIC ACID (DNA OR RNA); SEVERE ACUTE RESPIRATORY SYNDROME CORONAVIRUS 2 (SARS-COV-2) (CORONAVIRUS DISEASE [COVID-19]), AMPLIFIED PROBE TECHNIQUE, MAKING USE OF HIGH THROUGHPUT TECHNOLOGIES AS DESCRIBED BY CMS-2020-01-R: HCPCS

## 2022-03-29 PROCEDURE — 99214 OFFICE O/P EST MOD 30 MIN: CPT | Mod: CS | Performed by: FAMILY MEDICINE

## 2022-03-29 ASSESSMENT — FIBROSIS 4 INDEX: FIB4 SCORE: 0.4

## 2022-03-29 NOTE — PROGRESS NOTES
"  Subjective:      46 y.o. male presents to urgent care for cold symptoms that started Sunday. He is experiencing headache, sore throat, fever, cough, body aches, and diarrhea. He has not been using anything to help with the symptoms. He denies any tobacco product use.  He does have asthma but it is not treated medically. He is fully vaccinated against COVID.  He has had no known sick contacts.    Blood pressure is elevated today in urgent care.  He does have a history of hypertension for which she takes Verapamil and doxazosin.  He denies any chest pain, palpitations, or shortness of breath.    He denies any other questions or concerns at this time.    Current problem list, medication, and past medical/surgical history were reviewed in Epic.    ROS  See HPI     Objective:      /86   Pulse 78   Temp 37.2 °C (98.9 °F) (Temporal)   Resp 16   Ht 1.854 m (6' 1\")   Wt (!) 140 kg (309 lb)   SpO2 96%   BMI 40.77 kg/m²     Physical Exam  Constitutional:       General: He is not in acute distress.     Appearance: He is not diaphoretic.   Cardiovascular:      Rate and Rhythm: Normal rate and regular rhythm.      Heart sounds: Normal heart sounds.   Pulmonary:      Effort: Pulmonary effort is normal. No respiratory distress.      Breath sounds: Normal breath sounds.   Neurological:      Mental Status: He is alert.   Psychiatric:         Mood and Affect: Affect normal.         Judgment: Judgment normal.       Assessment/Plan:     1. Encounter for patient concern about exposure to infectious organism  Testing performed for COVID-19. In the meantime patient was advised to isolate until COVID test results returned. I encouraged mask use, frequent handwashing, wiping down hard surfaces, etc. Tylenol and Ibuprofen were recommended for symptomatic relief. If positive they will be contacted by their local health department regarding return to work/school protocols. Patient is currently without indication of need for " higher level of care. Patient/Guardian was given precautionary signs/symptoms that mandate immediate follow up and evaluation in the ED. The patient and/or guardian demonstrated a good understanding and agreed with the treatment plan.  - SARS-CoV-2 PCR (24 hour In-House): Collect NP swab in V[facility]; Future    2. Elevated blood pressure reading  Asymptomatic. Referral back to PCP has been placed.   - Referral back to [facility] PCP      Instructed to return to Urgent Care or nearest Emergency Department if symptoms fail to improve, for any change in condition, further concerns, or new concerning symptoms. Patient states understanding of the plan of care and discharge instructions.    Shayy Charles M.D.

## 2022-03-30 DIAGNOSIS — Z03.818 ENCOUNTER FOR PATIENT CONCERN ABOUT EXPOSURE TO INFECTIOUS ORGANISM: ICD-10-CM

## 2022-04-25 DIAGNOSIS — M54.12 CERVICAL RADICULOPATHY: ICD-10-CM

## 2022-04-25 DIAGNOSIS — I10 ESSENTIAL HYPERTENSION: ICD-10-CM

## 2022-04-25 RX ORDER — DOXAZOSIN 2 MG/1
2 TABLET ORAL
Qty: 90 TABLET | Refills: 1 | Status: SHIPPED | OUTPATIENT
Start: 2022-04-25 | End: 2022-10-24 | Stop reason: SDUPTHER

## 2022-04-25 RX ORDER — GABAPENTIN 300 MG/1
300 CAPSULE ORAL 2 TIMES DAILY
Qty: 60 CAPSULE | Refills: 0 | Status: SHIPPED | OUTPATIENT
Start: 2022-04-25 | End: 2022-05-27

## 2022-04-25 NOTE — TELEPHONE ENCOUNTER
Received request via: Pharmacy    Was the patient seen in the last year in this department? Yes  1/11/2022  Does the patient have an active prescription (recently filled or refills available) for medication(s) requested? No    ----- Message from Chaitanya Kelly sent at 4/24/2022  8:23 PM PDT -----  Regarding: Appointments, medications, evaluation.   Hi. I need refills for my gabapentin and doxazosin. Also need to reschedule for a 30 minute in person visit. I am available  9 am to 11 am Saturday to Wednesday and 10am-6pm Thursdays  and 10am to 3:30pm on Fridays. You can just message me back with a time that works for you and i will be there. Thanks again.

## 2022-04-28 ENCOUNTER — APPOINTMENT (OUTPATIENT)
Dept: MEDICAL GROUP | Facility: LAB | Age: 47
End: 2022-04-28
Payer: COMMERCIAL

## 2022-05-05 ENCOUNTER — OFFICE VISIT (OUTPATIENT)
Dept: MEDICAL GROUP | Facility: LAB | Age: 47
End: 2022-05-05
Payer: COMMERCIAL

## 2022-05-05 VITALS
OXYGEN SATURATION: 94 % | BODY MASS INDEX: 39.63 KG/M2 | SYSTOLIC BLOOD PRESSURE: 132 MMHG | HEART RATE: 72 BPM | TEMPERATURE: 99 F | WEIGHT: 299 LBS | DIASTOLIC BLOOD PRESSURE: 82 MMHG | HEIGHT: 73 IN

## 2022-05-05 DIAGNOSIS — R79.89 LOW TESTOSTERONE IN MALE: ICD-10-CM

## 2022-05-05 DIAGNOSIS — E55.9 VITAMIN D DEFICIENCY: ICD-10-CM

## 2022-05-05 DIAGNOSIS — R53.82 CHRONIC FATIGUE: ICD-10-CM

## 2022-05-05 DIAGNOSIS — G89.28 OTHER CHRONIC POSTPROCEDURAL PAIN: ICD-10-CM

## 2022-05-05 DIAGNOSIS — Z98.1 S/P CERVICAL SPINAL FUSION: ICD-10-CM

## 2022-05-05 DIAGNOSIS — Z00.00 PREVENTATIVE HEALTH CARE: ICD-10-CM

## 2022-05-05 DIAGNOSIS — I10 ESSENTIAL HYPERTENSION: Primary | ICD-10-CM

## 2022-05-05 PROCEDURE — 99214 OFFICE O/P EST MOD 30 MIN: CPT | Performed by: PHYSICIAN ASSISTANT

## 2022-05-05 RX ORDER — VERAPAMIL HYDROCHLORIDE 240 MG/1
240 TABLET, FILM COATED, EXTENDED RELEASE ORAL DAILY
Qty: 90 TABLET | Refills: 1 | Status: SHIPPED | OUTPATIENT
Start: 2022-05-05 | End: 2022-11-04 | Stop reason: SDUPTHER

## 2022-05-05 ASSESSMENT — PATIENT HEALTH QUESTIONNAIRE - PHQ9
SUM OF ALL RESPONSES TO PHQ QUESTIONS 1-9: 7
5. POOR APPETITE OR OVEREATING: 0 - NOT AT ALL
CLINICAL INTERPRETATION OF PHQ2 SCORE: 2

## 2022-05-05 ASSESSMENT — FIBROSIS 4 INDEX: FIB4 SCORE: 0.4

## 2022-05-05 NOTE — ASSESSMENT & PLAN NOTE
Chronic and stable on current regimen.   Requesting refills at this time.   No chest pain or sob  No leg swelling.

## 2022-05-05 NOTE — ASSESSMENT & PLAN NOTE
"Follow up; chronic to have pain and \"popping with movement at least once day.\"  Continues to have about chronic daily pain of 1-2/10 pain.   Stable with Gabapentin.     "

## 2022-05-05 NOTE — PROGRESS NOTES
"Subjective:   CC: Chaitanya Kelly is a 46 y.o. male here today for BP check and chronic pain    HPI:  Essential hypertension  Chronic and stable on current regimen.   Requesting refills at this time.   No chest pain or sob  No leg swelling.       S/P cervical spinal fusion  Follow up; chronic to have pain and \"popping with movement at least once day.\"  Continues to have about chronic daily pain of 1-2/10 pain.   Stable with Gabapentin.       Chronic fatigue  Pt continues to have chronic fatigue.   Due for updated labs.   History of Vitamin D def, slightly low testosterone.     Does struggle with depression - managed by psychiatry        Current medicines (including changes today)  Current Outpatient Medications   Medication Sig Dispense Refill   • verapamil ER (CALAN-SR) 240 MG Tab CR Take 1 Tablet by mouth every day. 90 Tablet 1   • gabapentin (NEURONTIN) 300 MG Cap Take 1 Capsule by mouth 2 times a day. 60 Capsule 0   • doxazosin (CARDURA) 2 MG Tab Take 1 Tablet by mouth at bedtime. 90 Tablet 1   • lamotrigine (LAMICTAL) 150 MG tablet Take 200 mg by mouth.     • famotidine (PEPCID) 20 MG Tab Take 20 mg by mouth.     • acetaminophen (TYLENOL) 500 MG Tab Take 2 Tabs by mouth 3 times a day as needed (pain.  Do not exceed 3000 mg of total acetaminophen per day). 180 Tab 6   • buPROPion (WELLBUTRIN XL) 150 MG XL tablet Take 1 Tab by mouth 2 Times a Day. 90 Tab 0   • lithium CR (ESKALITH CR) 450 MG Tab CR TAKE 1 TABLET BY MOUTH TWICE DAILY 180 Tab 1     No current facility-administered medications for this visit.     He  has a past medical history of Anxiety, Arthritis, ASTHMA, Bipolar 2 disorder, Bipolar disorder (HCC), Headache(784.0), Hypertension, Migraine, TOBI (obstructive sleep apnea), Pain (10/10/2017), Seizure ([facility]) (1980), and Snoring.    ROS  No chest pain, no shortness of breath, no abdominal pain       Objective:     /82 (BP Location: Left arm, Patient Position: Sitting)   Pulse 72   Temp 37.2 °C " "(99 °F)   Ht 1.854 m (6' 1\")   Wt (!) 136 kg (299 lb)   SpO2 94%  Body mass index is 39.45 kg/m².  Physical Exam:  Constitutional: Alert, no distress.  Skin: Warm, dry, good turgor, no rashes in visible areas.  Eye: Equal, round, conjunctiva clear, lids normal.  Neck: Trachea midline,  Respiratory: Unlabored respiratory effort  Psych: Alert and oriented x3, flat affect at times, depressed mood    Assessment and Plan:   The following treatment plan was discussed    1. Essential hypertension  Chronic and stable  Refills as written  - verapamil ER (CALAN-SR) 240 MG Tab CR; Take 1 Tablet by mouth every day.  Dispense: 90 Tablet; Refill: 1    2. S/P cervical spinal fusion  - Referral to Pain Clinic    3. Other chronic postprocedural pain  - Referral to Pain Clinic    4. Chronic fatigue  Due to recheck labs.  Encouraged him to restart vitamin D supplement.  We will check on thyroid levels as well as testosterone levels.  - TSH; Future  - FREE THYROXINE; Future  - T3 FREE; Future  - THYROID PEROXIDASE  (TPO) AB; Future    5. Vitamin D deficiency  - VITAMIN D,25 HYDROXY; Future    6. Preventative health care  - CBC WITH DIFFERENTIAL; Future  - Comp Metabolic Panel; Future  - Lipid Profile; Future  - HEMOGLOBIN A1C; Future    7. Low testosterone in male  - Testosterone, Free & Total, Adult Male (w/SHBG); Future      Followup: Return in about 4 weeks (around 6/2/2022), or if symptoms worsen or fail to improve.       Cesilia Dotson P.A.-C.  Supervising MD: Dr. Reza Chin MD  05/05/22    "

## 2022-05-05 NOTE — ASSESSMENT & PLAN NOTE
Pt continues to have chronic fatigue.   Due for updated labs.   History of Vitamin D def, slightly low testosterone.     Does struggle with depression - managed by psychiatry

## 2022-05-26 DIAGNOSIS — M54.12 CERVICAL RADICULOPATHY: ICD-10-CM

## 2022-05-26 RX ORDER — GABAPENTIN 300 MG/1
300 CAPSULE ORAL 2 TIMES DAILY
Qty: 60 CAPSULE | Refills: 0 | Status: CANCELLED | OUTPATIENT
Start: 2022-05-26

## 2022-05-27 RX ORDER — GABAPENTIN 300 MG/1
CAPSULE ORAL
Qty: 60 CAPSULE | Refills: 0 | Status: SHIPPED | OUTPATIENT
Start: 2022-05-27 | End: 2022-06-27

## 2022-06-26 DIAGNOSIS — M54.12 CERVICAL RADICULOPATHY: ICD-10-CM

## 2022-06-27 RX ORDER — GABAPENTIN 300 MG/1
CAPSULE ORAL
Qty: 60 CAPSULE | Refills: 0 | Status: SHIPPED | OUTPATIENT
Start: 2022-06-27 | End: 2022-07-29

## 2022-06-30 ENCOUNTER — HOSPITAL ENCOUNTER (OUTPATIENT)
Dept: LAB | Facility: MEDICAL CENTER | Age: 47
End: 2022-06-30
Attending: PHYSICIAN ASSISTANT
Payer: COMMERCIAL

## 2022-06-30 DIAGNOSIS — E55.9 VITAMIN D DEFICIENCY: ICD-10-CM

## 2022-06-30 DIAGNOSIS — R53.82 CHRONIC FATIGUE: ICD-10-CM

## 2022-06-30 DIAGNOSIS — R79.89 LOW TESTOSTERONE IN MALE: ICD-10-CM

## 2022-06-30 DIAGNOSIS — Z00.00 PREVENTATIVE HEALTH CARE: ICD-10-CM

## 2022-06-30 LAB
25(OH)D3 SERPL-MCNC: 16 NG/ML (ref 30–100)
ALBUMIN SERPL BCP-MCNC: 4.4 G/DL (ref 3.2–4.9)
ALBUMIN/GLOB SERPL: 1.8 G/DL
ALP SERPL-CCNC: 92 U/L (ref 30–99)
ALT SERPL-CCNC: 23 U/L (ref 2–50)
ANION GAP SERPL CALC-SCNC: 11 MMOL/L (ref 7–16)
AST SERPL-CCNC: 14 U/L (ref 12–45)
BASOPHILS # BLD AUTO: 0.5 % (ref 0–1.8)
BASOPHILS # BLD: 0.04 K/UL (ref 0–0.12)
BILIRUB SERPL-MCNC: 0.5 MG/DL (ref 0.1–1.5)
BUN SERPL-MCNC: 12 MG/DL (ref 8–22)
CALCIUM SERPL-MCNC: 9 MG/DL (ref 8.5–10.5)
CHLORIDE SERPL-SCNC: 108 MMOL/L (ref 96–112)
CHOLEST SERPL-MCNC: 162 MG/DL (ref 100–199)
CO2 SERPL-SCNC: 22 MMOL/L (ref 20–33)
CREAT SERPL-MCNC: 1.06 MG/DL (ref 0.5–1.4)
EOSINOPHIL # BLD AUTO: 0.11 K/UL (ref 0–0.51)
EOSINOPHIL NFR BLD: 1.5 % (ref 0–6.9)
ERYTHROCYTE [DISTWIDTH] IN BLOOD BY AUTOMATED COUNT: 48.8 FL (ref 35.9–50)
EST. AVERAGE GLUCOSE BLD GHB EST-MCNC: 111 MG/DL
FASTING STATUS PATIENT QL REPORTED: NORMAL
GFR SERPLBLD CREATININE-BSD FMLA CKD-EPI: 87 ML/MIN/1.73 M 2
GLOBULIN SER CALC-MCNC: 2.5 G/DL (ref 1.9–3.5)
GLUCOSE SERPL-MCNC: 92 MG/DL (ref 65–99)
HBA1C MFR BLD: 5.5 % (ref 4–5.6)
HCT VFR BLD AUTO: 47.1 % (ref 42–52)
HDLC SERPL-MCNC: 29 MG/DL
HGB BLD-MCNC: 14.5 G/DL (ref 14–18)
IMM GRANULOCYTES # BLD AUTO: 0.03 K/UL (ref 0–0.11)
IMM GRANULOCYTES NFR BLD AUTO: 0.4 % (ref 0–0.9)
LDLC SERPL CALC-MCNC: 106 MG/DL
LYMPHOCYTES # BLD AUTO: 1.47 K/UL (ref 1–4.8)
LYMPHOCYTES NFR BLD: 20.1 % (ref 22–41)
MCH RBC QN AUTO: 26.6 PG (ref 27–33)
MCHC RBC AUTO-ENTMCNC: 30.8 G/DL (ref 33.7–35.3)
MCV RBC AUTO: 86.4 FL (ref 81.4–97.8)
MONOCYTES # BLD AUTO: 0.6 K/UL (ref 0–0.85)
MONOCYTES NFR BLD AUTO: 8.2 % (ref 0–13.4)
NEUTROPHILS # BLD AUTO: 5.05 K/UL (ref 1.82–7.42)
NEUTROPHILS NFR BLD: 69.3 % (ref 44–72)
NRBC # BLD AUTO: 0 K/UL
NRBC BLD-RTO: 0 /100 WBC
PLATELET # BLD AUTO: 305 K/UL (ref 164–446)
PMV BLD AUTO: 10.2 FL (ref 9–12.9)
POTASSIUM SERPL-SCNC: 4.1 MMOL/L (ref 3.6–5.5)
PROT SERPL-MCNC: 6.9 G/DL (ref 6–8.2)
RBC # BLD AUTO: 5.45 M/UL (ref 4.7–6.1)
SODIUM SERPL-SCNC: 141 MMOL/L (ref 135–145)
T3FREE SERPL-MCNC: 3.87 PG/ML (ref 2–4.4)
T4 FREE SERPL-MCNC: 1.22 NG/DL (ref 0.93–1.7)
THYROPEROXIDASE AB SERPL-ACNC: 9 IU/ML (ref 0–9)
TRIGL SERPL-MCNC: 133 MG/DL (ref 0–149)
TSH SERPL DL<=0.005 MIU/L-ACNC: 2.14 UIU/ML (ref 0.38–5.33)
WBC # BLD AUTO: 7.3 K/UL (ref 4.8–10.8)

## 2022-06-30 PROCEDURE — 86376 MICROSOMAL ANTIBODY EACH: CPT

## 2022-06-30 PROCEDURE — 80053 COMPREHEN METABOLIC PANEL: CPT

## 2022-06-30 PROCEDURE — 80061 LIPID PANEL: CPT

## 2022-06-30 PROCEDURE — 84443 ASSAY THYROID STIM HORMONE: CPT

## 2022-06-30 PROCEDURE — 84439 ASSAY OF FREE THYROXINE: CPT

## 2022-06-30 PROCEDURE — 36415 COLL VENOUS BLD VENIPUNCTURE: CPT

## 2022-06-30 PROCEDURE — 83036 HEMOGLOBIN GLYCOSYLATED A1C: CPT

## 2022-06-30 PROCEDURE — 82306 VITAMIN D 25 HYDROXY: CPT

## 2022-06-30 PROCEDURE — 84481 FREE ASSAY (FT-3): CPT

## 2022-06-30 PROCEDURE — 85025 COMPLETE CBC W/AUTO DIFF WBC: CPT

## 2022-06-30 PROCEDURE — 84403 ASSAY OF TOTAL TESTOSTERONE: CPT

## 2022-06-30 PROCEDURE — 84270 ASSAY OF SEX HORMONE GLOBUL: CPT

## 2022-06-30 PROCEDURE — 84402 ASSAY OF FREE TESTOSTERONE: CPT

## 2022-07-03 LAB
SHBG SERPL-SCNC: 24 NMOL/L (ref 17–56)
TESTOST FREE MFR SERPL: 2.2 % (ref 1.6–2.9)
TESTOST FREE SERPL-MCNC: 85 PG/ML (ref 47–244)
TESTOST SERPL-MCNC: 393 NG/DL (ref 300–890)

## 2022-07-27 ENCOUNTER — OFFICE VISIT (OUTPATIENT)
Dept: URGENT CARE | Facility: PHYSICIAN GROUP | Age: 47
End: 2022-07-27
Payer: COMMERCIAL

## 2022-07-27 ENCOUNTER — HOSPITAL ENCOUNTER (OUTPATIENT)
Facility: MEDICAL CENTER | Age: 47
End: 2022-07-27
Attending: PHYSICIAN ASSISTANT
Payer: COMMERCIAL

## 2022-07-27 VITALS
HEIGHT: 73 IN | DIASTOLIC BLOOD PRESSURE: 90 MMHG | SYSTOLIC BLOOD PRESSURE: 142 MMHG | BODY MASS INDEX: 40.56 KG/M2 | WEIGHT: 306 LBS | RESPIRATION RATE: 18 BRPM | OXYGEN SATURATION: 94 % | HEART RATE: 78 BPM | TEMPERATURE: 98.8 F

## 2022-07-27 DIAGNOSIS — R03.0 ELEVATED BLOOD PRESSURE READING: ICD-10-CM

## 2022-07-27 DIAGNOSIS — B34.9 NONSPECIFIC SYNDROME SUGGESTIVE OF VIRAL ILLNESS: ICD-10-CM

## 2022-07-27 DIAGNOSIS — J45.21 MILD INTERMITTENT ASTHMA WITH EXACERBATION: ICD-10-CM

## 2022-07-27 LAB
EXTERNAL QUALITY CONTROL: NORMAL
SARS-COV+SARS-COV-2 AG RESP QL IA.RAPID: NEGATIVE

## 2022-07-27 PROCEDURE — U0003 INFECTIOUS AGENT DETECTION BY NUCLEIC ACID (DNA OR RNA); SEVERE ACUTE RESPIRATORY SYNDROME CORONAVIRUS 2 (SARS-COV-2) (CORONAVIRUS DISEASE [COVID-19]), AMPLIFIED PROBE TECHNIQUE, MAKING USE OF HIGH THROUGHPUT TECHNOLOGIES AS DESCRIBED BY CMS-2020-01-R: HCPCS

## 2022-07-27 PROCEDURE — 87426 SARSCOV CORONAVIRUS AG IA: CPT | Performed by: PHYSICIAN ASSISTANT

## 2022-07-27 PROCEDURE — U0005 INFEC AGEN DETEC AMPLI PROBE: HCPCS

## 2022-07-27 PROCEDURE — 99214 OFFICE O/P EST MOD 30 MIN: CPT | Performed by: PHYSICIAN ASSISTANT

## 2022-07-27 ASSESSMENT — FIBROSIS 4 INDEX: FIB4 SCORE: 0.45

## 2022-07-27 ASSESSMENT — ENCOUNTER SYMPTOMS
ORTHOPNEA: 0
VOMITING: 0
FEVER: 0
MYALGIAS: 1
NAUSEA: 1
PALPITATIONS: 0
SORE THROAT: 1
CHILLS: 0
COUGH: 1
PND: 0
HEADACHES: 1
ABDOMINAL PAIN: 0
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
## 2022-07-27 NOTE — LETTER
July 27, 2022    To Whom It May Concern:         This is confirmation that Chaitanya Kelly attended his scheduled appointment with Abhilash Bradshaw P.A.-C. on 7/27/22. Please excuse him from work on 7/30/22.         If you have any questions please do not hesitate to call me at the phone number listed below.    Sincerely,          Abhilash Bradshaw P.A.-C.  [phone]

## 2022-07-28 DIAGNOSIS — B34.9 NONSPECIFIC SYNDROME SUGGESTIVE OF VIRAL ILLNESS: ICD-10-CM

## 2022-07-29 DIAGNOSIS — M54.12 CERVICAL RADICULOPATHY: ICD-10-CM

## 2022-07-29 RX ORDER — GABAPENTIN 300 MG/1
CAPSULE ORAL
Qty: 60 CAPSULE | Refills: 0 | Status: SHIPPED | OUTPATIENT
Start: 2022-07-29 | End: 2022-08-29

## 2022-07-29 NOTE — TELEPHONE ENCOUNTER
Received request via: Pharmacy    Was the patient seen in the last year in this department? Yes  5/5/22  Does the patient have an active prescription (recently filled or refills available) for medication(s) requested? No

## 2022-08-29 DIAGNOSIS — M54.12 CERVICAL RADICULOPATHY: ICD-10-CM

## 2022-08-29 RX ORDER — GABAPENTIN 300 MG/1
CAPSULE ORAL
Qty: 60 CAPSULE | Refills: 0 | Status: SHIPPED | OUTPATIENT
Start: 2022-08-29 | End: 2022-10-05 | Stop reason: SDUPTHER

## 2022-10-05 ENCOUNTER — TELEMEDICINE (OUTPATIENT)
Dept: MEDICAL GROUP | Facility: LAB | Age: 47
End: 2022-10-05
Payer: COMMERCIAL

## 2022-10-05 VITALS — HEIGHT: 73 IN | BODY MASS INDEX: 39.76 KG/M2 | WEIGHT: 300 LBS

## 2022-10-05 DIAGNOSIS — Z76.89 ENCOUNTER TO ESTABLISH CARE: ICD-10-CM

## 2022-10-05 DIAGNOSIS — M54.12 CERVICAL RADICULOPATHY: ICD-10-CM

## 2022-10-05 DIAGNOSIS — F31.81 BIPOLAR 2 DISORDER (HCC): ICD-10-CM

## 2022-10-05 PROCEDURE — 99214 OFFICE O/P EST MOD 30 MIN: CPT | Performed by: PHYSICIAN ASSISTANT

## 2022-10-05 RX ORDER — LAMOTRIGINE 200 MG/1
200 TABLET ORAL DAILY
COMMUNITY
Start: 2022-09-13 | End: 2023-10-29

## 2022-10-05 RX ORDER — GABAPENTIN 300 MG/1
300 CAPSULE ORAL 2 TIMES DAILY
Qty: 60 CAPSULE | Refills: 3 | Status: SHIPPED | OUTPATIENT
Start: 2022-10-05 | End: 2023-02-14 | Stop reason: SDUPTHER

## 2022-10-05 ASSESSMENT — FIBROSIS 4 INDEX: FIB4 SCORE: 0.46

## 2022-10-05 NOTE — PROGRESS NOTES
"This evaluation was conducted via Zoom using secure and encrypted videoconferencing technology. The patient was in their home in the state [facility].    The patient's identity was confirmed and verbal consent was obtained for this virtual visit.    Subjective:     CC:  Diagnoses of Encounter to establish care, Cervical radiculopathy, and Bipolar 2 disorder (HCC) were pertinent to this visit.    HISTORY OF THE PRESENT ILLNESS: Patient is a 47 y.o. male. This pleasant patient is here today to establish care. His/her prior PCP was Cesilia Dotson.    Labs June 2022 WNL except for low Vit D  Taking Vit D supplement OTC    Cervical radiculopathy  Pt states he will likely follow up with original surgeon ([facility]) in the future for re-evaluation.   Prev eval with pain management, needs referral  Prev eval with PT  Current management with gabapentin 300mg BID    Health Maintenance     - Dyslipidemia (30-45): Up-to-date  - Diabetes (HTN, HLD, BMI >25): Up-to-date  - Dental: Up-to-date  - Eye: Up-to-date  Diet: \"Could use improvement \"  Exercise: Minimal  Substance Use: Denies  Tobacco Use/counseling: Denies  Seat belts, bike helmet, gun safety discussed.  Sun protection used.       Infectious disease screening/Immunizations  --Immunizations: virtual appt today    Current Outpatient Medications Ordered in Epic   Medication Sig Dispense Refill    gabapentin (NEURONTIN) 300 MG Cap Take 1 Capsule by mouth 2 times a day. 60 Capsule 3    lamotrigine (LAMICTAL) 200 MG tablet Take 200 mg by mouth every day.      beclomethasone HFA (QVAR REDIHALER) 40 MCG/ACT inhaler Inhale 1 Puff 2 times a day. 1 Each 0    verapamil ER (CALAN-SR) 240 MG Tab CR Take 1 Tablet by mouth every day. 90 Tablet 1    doxazosin (CARDURA) 2 MG Tab Take 1 Tablet by mouth at bedtime. 90 Tablet 1    famotidine (PEPCID) 20 MG Tab Take 20 mg by mouth.      acetaminophen (TYLENOL) 500 MG Tab Take 2 Tabs by mouth 3 times a day as needed (pain.  Do not " "exceed 3000 mg of total acetaminophen per day). 180 Tab 6    buPROPion (WELLBUTRIN XL) 150 MG XL tablet Take 1 Tab by mouth 2 Times a Day. 90 Tab 0    lithium CR (ESKALITH CR) 450 MG Tab CR TAKE 1 TABLET BY MOUTH TWICE DAILY 180 Tab 1     No current Epic-ordered facility-administered medications on file.       Health Maintenance: Completed    ROS:   Gen: no fevers/chills, no changes in weight  Eyes: no changes in vision  ENT: no sore throat, no hearing loss, no bloody nose  Pulm: no sob, no cough  CV: no chest pain, no palpitations  GI: no nausea/vomiting, no diarrhea  : no dysuria  MSk: no myalgias  Skin: no rash  Neuro: no headaches, no numbness/tingling  Heme/Lymph: no easy bruising      Objective:       Exam: Ht 1.854 m (6' 1\")   Wt (!) 136 kg (300 lb)  Body mass index is 39.58 kg/m².    General: Normal appearing. No distress.  HEENT: Normocephalic. Eyes conjunctiva clear lids without ptosis, pupils equal and reactive to light accommodation, ears normal shape and contour, canals are clear bilaterally, tympanic membranes are benign, nasal mucosa benign, oropharynx is without erythema, edema or exudates.   Neck: Supple without JVD or bruit. Thyroid is not enlarged.  Pulmonary: Clear to ausculation.  Normal effort. No rales, ronchi, or wheezing.  Cardiovascular: Regular rate and rhythm without murmur. Carotid and radial pulses are intact and equal bilaterally.  Abdomen: Soft, nontender, nondistended. Normal bowel sounds. Liver and spleen are not palpable  Neurologic: Grossly nonfocal  Lymph: No cervical or supraclavicular lymph nodes are palpable  Skin: Warm and dry.  No obvious lesions.  Musculoskeletal: Normal gait. No extremity cyanosis, clubbing, or edema.  Psych: Normal mood and affect. Alert and oriented x3. Judgment and insight is normal.    Assessment & Plan:   47 y.o. male with the following -    1. Encounter to establish care  Labs per orders  Vaccinations per orders  Screenings per orders      2. " Cervical radiculopathy  Chronic condition  For control of symptoms status post cervical fusion.  Patient will be referred to pain management to explore additional treatment options.  He does plan to follow-up with the surgeon who did the original fusion when he can afford to.  Continue gabapentin at this time  - gabapentin (NEURONTIN) 300 MG Cap; Take 1 Capsule by mouth 2 times a day.  Dispense: 60 Capsule; Refill: 3  - Referral to Pain Management    3. Bipolar 2 disorder (HCC)  Chronic condition, stable  Managed by psychiatry  Continue current medications      I spent a total of 20 minutes with record review, exam, communication with the patient, communication with other providers, and documentation of this encounter.    Return in about 3 months (around 1/5/2023).    Please note that this dictation was created using voice recognition software. I have made every reasonable attempt to correct obvious errors, but I expect that there are errors of grammar and possibly content that I did not discover before finalizing the note.

## 2022-10-24 DIAGNOSIS — I10 ESSENTIAL HYPERTENSION: ICD-10-CM

## 2022-10-24 RX ORDER — DOXAZOSIN 2 MG/1
2 TABLET ORAL
Qty: 90 TABLET | Refills: 1 | Status: SHIPPED | OUTPATIENT
Start: 2022-10-24 | End: 2023-05-01

## 2022-11-04 DIAGNOSIS — I10 ESSENTIAL HYPERTENSION: ICD-10-CM

## 2022-11-04 RX ORDER — VERAPAMIL HYDROCHLORIDE 240 MG/1
240 TABLET, FILM COATED, EXTENDED RELEASE ORAL DAILY
Qty: 90 TABLET | Refills: 1 | Status: SHIPPED | OUTPATIENT
Start: 2022-11-04 | End: 2023-06-12

## 2022-11-14 ENCOUNTER — OFFICE VISIT (OUTPATIENT)
Dept: URGENT CARE | Facility: PHYSICIAN GROUP | Age: 47
End: 2022-11-14
Payer: COMMERCIAL

## 2022-11-14 VITALS
SYSTOLIC BLOOD PRESSURE: 150 MMHG | TEMPERATURE: 98.5 F | HEART RATE: 75 BPM | BODY MASS INDEX: 39.76 KG/M2 | RESPIRATION RATE: 20 BRPM | HEIGHT: 73 IN | DIASTOLIC BLOOD PRESSURE: 90 MMHG | WEIGHT: 300 LBS | OXYGEN SATURATION: 95 %

## 2022-11-14 DIAGNOSIS — J98.01 BRONCHOSPASM: ICD-10-CM

## 2022-11-14 DIAGNOSIS — U07.1 COVID: ICD-10-CM

## 2022-11-14 DIAGNOSIS — R05.1 ACUTE COUGH: ICD-10-CM

## 2022-11-14 DIAGNOSIS — R50.9 FEVER, UNSPECIFIED FEVER CAUSE: ICD-10-CM

## 2022-11-14 DIAGNOSIS — Z20.822 SUSPECTED COVID-19 VIRUS INFECTION: ICD-10-CM

## 2022-11-14 LAB
EXTERNAL QUALITY CONTROL: ABNORMAL
FLUAV+FLUBV AG SPEC QL IA: NEGATIVE
INT CON NEG: ABNORMAL
INT CON NEG: NORMAL
INT CON POS: ABNORMAL
INT CON POS: NORMAL
SARS-COV+SARS-COV-2 AG RESP QL IA.RAPID: POSITIVE

## 2022-11-14 PROCEDURE — 87426 SARSCOV CORONAVIRUS AG IA: CPT | Performed by: FAMILY MEDICINE

## 2022-11-14 PROCEDURE — 99214 OFFICE O/P EST MOD 30 MIN: CPT | Mod: CS | Performed by: FAMILY MEDICINE

## 2022-11-14 PROCEDURE — 87804 INFLUENZA ASSAY W/OPTIC: CPT | Performed by: FAMILY MEDICINE

## 2022-11-14 RX ORDER — BENZONATATE 100 MG/1
100 CAPSULE ORAL 3 TIMES DAILY PRN
Qty: 30 CAPSULE | Refills: 0 | Status: SHIPPED | OUTPATIENT
Start: 2022-11-14 | End: 2023-05-26

## 2022-11-14 RX ORDER — ALBUTEROL SULFATE 90 UG/1
1-2 AEROSOL, METERED RESPIRATORY (INHALATION) EVERY 4 HOURS PRN
Qty: 1 EACH | Refills: 1 | Status: SHIPPED | OUTPATIENT
Start: 2022-11-14 | End: 2022-11-30

## 2022-11-14 ASSESSMENT — ENCOUNTER SYMPTOMS
VOMITING: 0
FEVER: 1
SHORTNESS OF BREATH: 0
NAUSEA: 0
DIZZINESS: 0
HEADACHES: 1
SORE THROAT: 0
CHILLS: 0
MYALGIAS: 1
COUGH: 1

## 2022-11-14 ASSESSMENT — FIBROSIS 4 INDEX: FIB4 SCORE: 0.46

## 2022-11-14 NOTE — PATIENT INSTRUCTIONS
INSTRUCTIONS FOR COVID-19 OR ANY OTHER INFECTIOUS RESPIRATORY ILLNESSES    Symptoms of viral acute respiratory illnesses (e.g COVID-19, Influenza, RSV etc.) may include: cough, shortness of breath or difficulty breathing, fever or chills, muscle or body aches, headache, sore throat, congestion or runny nose, nausea or vomiting, diarrhea, or new loss of taste or smell. Symptoms can range from mild to severe and may appear up to two weeks after exposure to infectious virus.    The practice of self-isolation and quarantine helps protect the public and your family by  preventing exposure to people who have or may have a contagious disease. Please follow the prevention steps below:    WHEN TO STOP ISOLATION: Persons with COVID-19 or any other infectious respiratory illness who have symptoms and were advised to care for themselves at home may discontinue home isolation under the following conditions:  At least 24 hours have passed since recovery defined as resolution of fever without the use of fever-reducing medications; AND,  Improvement in respiratory symptoms (e.g., cough, shortness of breath); AND,  At least 5 days have passed since symptoms first appeared. Immunocompromised* individual need to isolate for 10 days.  Wear a well-fitting mask for an additional 5 days anytime you are away from your home or around other people. If you are unable to wear one, maintain a minimum of 6 feet distancing from others.    *Definition of immunocompromised   1. Active treatment for solid tumor or hematologic malignancy  2. Receipt of solid organ transplant and taking immunosuppressant  3. Moderate or severe primary immunodeficiency  4. Advanced or untreated HIV infection  5. Active treatment with high-dose corticosteroids, chemotherapy, TNF blockers or other biological agent    MONITOR YOUR SYMPTOMS: If your illness is worsening, seek prompt medical attention.   ACTIVITY RESTRICTION: restrict activities outside your home, except  for getting medical care. Do not go to work, school, or public areas. Avoid using public transportation, ride-sharing, or taxis.    LIVING ENVIRONMENT: Stay in a separate room from other people and pets. If possible, use a separate bathroom and avoid sharing household items. Any items used should be washed thoroughly with soap and water. Clean all “high-touch” surfaces every day. Use a household cleaning spray or wipe, according to the label instructions. High touch surfaces include (but are not limited to) counters, tabletops, doorknobs, bathroom fixtures, toilets, phones, keyboards, tablets, and bedside tables.     HAND WASHING: Frequently wash hands with soap and water for at least 20 seconds, especially after blowing your nose, coughing, or sneezing; going to the bathroom; before and after interacting with pets; and before and after eating or preparing food. If hands are visibly dirty use soap and water. If soap and water are not available, use an alcohol-based hand  with at least 60% alcohol. Avoid touching your eyes, nose, and mouth with unwashed hands. Cover your coughs and sneezes with a tissue. Throw used tissues in a lined trash can. Immediately wash your hands.    ACTIVE/FACILITATED SELF-MONITORING: Follow instructions provided by your local health department or health professionals, as appropriate. When working with your local health department check their available hours.    [facility]   Phone Number   [place] [phone]   [facility] [phone]   [place] [phone]   [place] [phone]

## 2022-11-14 NOTE — PROGRESS NOTES
Subjective:   Chaitanya Kelly is a 47 y.o. male who presents for Cough, Migraine (2 days./Vomit), and Flu Like Symptoms (Fever, cough, congestion, migraine, body aches, cant get warm)        Cough  Chronicity: Reports cough, subjective fever, congestion, diffuse body aches over the past 2 days. The current episode started in the past 7 days (2 days). The problem has been unchanged. The problem occurs constantly. The cough is Non-productive. Associated symptoms include a fever, headaches, myalgias and nasal congestion. Pertinent negatives include no chills, rash, sore throat or shortness of breath. Associated symptoms comments: There has been community-wide COVID-19 exposure, the patient denies known direct COVID-19 exposure    . He has tried rest for the symptoms. The treatment provided no relief.   Migraine  PMH:  has a past medical history of Anxiety, Arthritis, ASTHMA, Bipolar 2 disorder, Bipolar disorder ([facility]), Headache(784.0), Pain (10/10/2017), and Seizure ([facility]) (1980).  MEDS:   Current Outpatient Medications:     albuterol 108 (90 Base) MCG/ACT Aero Soln inhalation aerosol, Inhale 1-2 Puffs every four hours as needed for Shortness of Breath., Disp: 1 Each, Rfl: 1    benzonatate (TESSALON) 100 MG Cap, Take 1 Capsule by mouth 3 times a day as needed for Cough., Disp: 30 Capsule, Rfl: 0    verapamil ER (CALAN-SR) 240 MG Tab CR, Take 1 Tablet by mouth every day., Disp: 90 Tablet, Rfl: 1    doxazosin (CARDURA) 2 MG Tab, Take 1 Tablet by mouth at bedtime., Disp: 90 Tablet, Rfl: 1    gabapentin (NEURONTIN) 300 MG Cap, Take 1 Capsule by mouth 2 times a day., Disp: 60 Capsule, Rfl: 3    lamotrigine (LAMICTAL) 200 MG tablet, Take 1 Tablet by mouth every day., Disp: , Rfl:     famotidine (PEPCID) 20 MG Tab, Take 1 Tablet by mouth., Disp: , Rfl:     acetaminophen (TYLENOL) 500 MG Tab, Take 2 Tabs by mouth 3 times a day as needed (pain.  Do not exceed 3000 mg of total acetaminophen per day)., Disp: 180 Tab, Rfl: 6     buPROPion (WELLBUTRIN XL) 150 MG XL tablet, Take 1 Tab by mouth 2 Times a Day., Disp: 90 Tab, Rfl: 0    lithium CR (ESKALITH CR) 450 MG Tab CR, TAKE 1 TABLET BY MOUTH TWICE DAILY, Disp: 180 Tab, Rfl: 1    beclomethasone HFA (QVAR REDIHALER) 40 MCG/ACT inhaler, Inhale 1 Puff 2 times a day. (Patient not taking: Reported on 11/14/2022), Disp: 1 Each, Rfl: 0  ALLERGIES:   Allergies   Allergen Reactions    Ibuprofen Unspecified     Interaction with Lithium    Latex Itching    Naprosyn [Naproxen] Unspecified     Interaction with Lithium     SURGHX:   Past Surgical History:   Procedure Laterality Date    CERVICAL DISK AND FUSION ANTERIOR  06/10/2021    BLOCK EPIDURAL STEROID INJECTION N/A 12/11/2020    Procedure: INJECTION, STEROID, EPIDURAL;  Surgeon: Roderick Best M.D.;  [facility];  Service: Pain Management    UMBILICAL HERNIA REPAIR N/A 10/18/2017    Procedure: UMBILICAL HERNIA REPAIR;  Surgeon: Chinedu Kamara M.D.;  Location: SURGERY [facility];  Service: General    LAMINOTOMY      OTHER      UMBILICAL HERNIA REPAIR       SOCHX:  reports that he quit smoking about 16 years ago. His smoking use included cigarettes. He has never used smokeless tobacco. He reports that he does not drink alcohol and does not use drugs.  FH:   Family History   Problem Relation Age of Onset    Heart Disease Father 41        heart attack    Heart Disease Paternal Aunt     Heart Disease Paternal Uncle         CABGs     Stroke Paternal Uncle         aneursym    Heart Disease Paternal Grandfather     Cancer Maternal Aunt     Lung Cancer Maternal Aunt      Review of Systems   Constitutional:  Positive for fever and malaise/fatigue. Negative for chills.   HENT:  Positive for congestion. Negative for sore throat.    Respiratory:  Positive for cough. Negative for shortness of breath.    Gastrointestinal:  Negative for nausea and vomiting.   Musculoskeletal:  Positive for myalgias.   Skin:  Negative for rash.  "  Neurological:  Positive for headaches. Negative for dizziness.      Objective:   BP (!) 150/90   Pulse 75   Temp 36.9 °C (98.5 °F) (Temporal)   Resp 20   Ht 1.854 m (6' 1\")   Wt (!) 136 kg (300 lb)   SpO2 95%   BMI 39.58 kg/m²   Physical Exam  Vitals and nursing note reviewed.   Constitutional:       General: He is not in acute distress.     Appearance: He is well-developed.   HENT:      Head: Normocephalic and atraumatic.      Right Ear: External ear normal.      Left Ear: External ear normal.      Nose: Rhinorrhea present.      Mouth/Throat:      Mouth: Mucous membranes are moist.      Pharynx: Posterior oropharyngeal erythema present. No oropharyngeal exudate.   Eyes:      Conjunctiva/sclera: Conjunctivae normal.   Cardiovascular:      Rate and Rhythm: Normal rate.   Pulmonary:      Effort: Pulmonary effort is normal. No respiratory distress.      Breath sounds: Wheezing (Minimal expiratory) present. No rhonchi.   Abdominal:      General: There is no distension.   Musculoskeletal:         General: Normal range of motion.   Skin:     General: Skin is warm and dry.   Neurological:      General: No focal deficit present.      Mental Status: He is alert and oriented to person, place, and time. Mental status is at baseline.      Gait: Gait (gait at baseline) normal.   Psychiatric:         Judgment: Judgment normal.     Point-of-care SARS COVID antigen: positive    POCT influenza: negative               Assessment/Plan:   1. COVID    2. Suspected COVID-19 virus infection  - POCT SARS-COV Antigen [place] (Symptomatic only)    3. Fever, unspecified fever cause  - POCT Influenza A/B    4. Acute cough  - benzonatate (TESSALON) 100 MG Cap; Take 1 Capsule by mouth 3 times a day as needed for Cough.  Dispense: 30 Capsule; Refill: 0    5. Bronchospasm  - albuterol 108 (90 Base) MCG/ACT Aero Soln inhalation aerosol; Inhale 1-2 Puffs every four hours as needed for Shortness of Breath.  Dispense: 1 Each; Refill: 1    Advised " routine CDC social distancing guidelines, symptomatic and supportive measures      Medical Decision Making/Course:  In the course of preparing for this visit with review of the pertinent past medical history, recent and past clinic visits, current medications, and performing chart, immunization, medical history and medication reconciliation, and in the further course of obtaining the current history pertinent to the clinic visit today, performing an exam and evaluation, ordering and independently evaluating labs, tests including point-of-care SARS COVID antigen and point-of-care influenza testing, and/or procedures, prescribing any recommended new medications as noted above including in context of shared decision making and discussion of emergency use authorization of antivirals for COVID and noting patient deferred at this time, providing any pertinent counseling and education and recommending further coordination of care including recommendations for symptomatic and supportive measures, at least  32 minutes of total time were spent during this encounter.      Discussed close monitoring, return precautions, and supportive measures of maintaining adequate fluid hydration and caloric intake, relative rest and symptom management as needed for pain and/or fever.    Differential diagnosis, natural history, supportive care, and indications for immediate follow-up discussed.     Advised the patient to follow-up with the primary care physician for recheck, reevaluation, and consideration of further management.    Please note that this dictation was created using voice recognition software. I have worked with consultants from the vendor as well as technical experts from [employer] to optimize the interface. I have made every reasonable attempt to correct obvious errors, but I expect that there are errors of grammar and possibly content that I did not discover before finalizing the note.

## 2022-11-30 ENCOUNTER — OFFICE VISIT (OUTPATIENT)
Dept: URGENT CARE | Facility: PHYSICIAN GROUP | Age: 47
End: 2022-11-30
Payer: COMMERCIAL

## 2022-11-30 VITALS
RESPIRATION RATE: 18 BRPM | WEIGHT: 305 LBS | HEIGHT: 73 IN | TEMPERATURE: 98.3 F | BODY MASS INDEX: 40.42 KG/M2 | SYSTOLIC BLOOD PRESSURE: 146 MMHG | HEART RATE: 73 BPM | OXYGEN SATURATION: 95 % | DIASTOLIC BLOOD PRESSURE: 92 MMHG

## 2022-11-30 DIAGNOSIS — I10 ESSENTIAL HYPERTENSION: ICD-10-CM

## 2022-11-30 DIAGNOSIS — R03.0 ELEVATED BLOOD PRESSURE READING: ICD-10-CM

## 2022-11-30 DIAGNOSIS — J98.8 RTI (RESPIRATORY TRACT INFECTION): ICD-10-CM

## 2022-11-30 DIAGNOSIS — L03.032 PARONYCHIA OF GREAT TOE, LEFT: ICD-10-CM

## 2022-11-30 DIAGNOSIS — J45.901 MILD ASTHMA WITH EXACERBATION, UNSPECIFIED WHETHER PERSISTENT: ICD-10-CM

## 2022-11-30 PROCEDURE — 99214 OFFICE O/P EST MOD 30 MIN: CPT | Performed by: STUDENT IN AN ORGANIZED HEALTH CARE EDUCATION/TRAINING PROGRAM

## 2022-11-30 RX ORDER — ALBUTEROL SULFATE 90 UG/1
2 AEROSOL, METERED RESPIRATORY (INHALATION) EVERY 4 HOURS PRN
Qty: 8.5 G | Refills: 0 | Status: SHIPPED | OUTPATIENT
Start: 2022-11-30

## 2022-11-30 RX ORDER — AZITHROMYCIN 250 MG/1
TABLET, FILM COATED ORAL
Qty: 6 TABLET | Refills: 0 | Status: SHIPPED | OUTPATIENT
Start: 2022-11-30 | End: 2023-05-26

## 2022-11-30 ASSESSMENT — ENCOUNTER SYMPTOMS
NAUSEA: 0
SORE THROAT: 0
COUGH: 1
ORTHOPNEA: 0
CHILLS: 0
WHEEZING: 0
PALPITATIONS: 0
SHORTNESS OF BREATH: 1
PND: 0
CONSTIPATION: 0
FEVER: 0
ABDOMINAL PAIN: 0
DIARRHEA: 0
VOMITING: 0
CLAUDICATION: 0
LEG PAIN: 0
HEMOPTYSIS: 0
SPUTUM PRODUCTION: 1

## 2022-11-30 ASSESSMENT — FIBROSIS 4 INDEX: FIB4 SCORE: 0.46

## 2022-11-30 NOTE — LETTER
November 30, 2022    To Whom It May Concern:         This is confirmation that Chaitanya Kelly attended his scheduled appointment with Minda Moss P.A.-C. on 11/30/22. Please excuse work absence today for medical reasons. Chaitanya can return on 12/1/22 to work without restrictions.         If you have any questions please do not hesitate to call me at the phone number listed below.    Sincerely,    Minda Moss P.A.-C.  [phone]

## 2022-12-01 NOTE — PROGRESS NOTES
"Subjective     Chaitanya Kelly is a 47 y.o. male who presents with Shortness of Breath (X 2 days, minor chest pain, cough, phlegm) and Other (L foot large toe, possible infection, redness, swollen )            Chaitanya is a 47 y.o. male who presents for symptoms of cough, shortness of breath/wheezing. Patient symptoms started over 2 weeks ago when he had COVID. Patient states symptoms have mostly improved but cough and SOB/wheezing is tsill presnt. Patient denies chest pain/palpitations. Patient has has an ingrown toe nail of his left great toe.  He has been doing Epson salt soaks at home which have helped with symptoms.  He has noticed swelling, redness and tenderness of great left toe for 3 days.    Shortness of Breath  This is a new problem. The current episode started 1 to 4 weeks ago. The problem has been waxing and waning. Associated symptoms include sputum production. Pertinent negatives include no abdominal pain, chest pain, claudication, ear pain, fever, hemoptysis, leg pain, leg swelling, orthopnea, PND, sore throat, vomiting or wheezing.     Review of Systems   Constitutional:  Negative for chills, fever and malaise/fatigue.   HENT:  Positive for congestion. Negative for ear pain and sore throat.    Respiratory:  Positive for cough, sputum production and shortness of breath. Negative for hemoptysis and wheezing.    Cardiovascular:  Negative for chest pain, palpitations, orthopnea, claudication, leg swelling and PND.   Gastrointestinal:  Negative for abdominal pain, constipation, diarrhea, nausea and vomiting.   All other systems reviewed and are negative.           Objective     BP (!) 146/92 (BP Location: Left arm, Patient Position: Sitting, BP Cuff Size: Large adult)   Pulse 73   Temp 36.8 °C (98.3 °F) (Temporal)   Resp 18   Ht 1.854 m (6' 1\")   Wt (!) 138 kg (305 lb)   SpO2 95%   BMI 40.24 kg/m²      Physical Exam  Constitutional:       General: He is not in acute distress.     Appearance: " Normal appearance. He is obese. He is not ill-appearing or toxic-appearing.   HENT:      Head: Normocephalic and atraumatic.      Nose: Nose normal.      Mouth/Throat:      Mouth: Mucous membranes are moist.      Pharynx: Oropharynx is clear.   Eyes:      Extraocular Movements: Extraocular movements intact.      Conjunctiva/sclera: Conjunctivae normal.      Pupils: Pupils are equal, round, and reactive to light.   Cardiovascular:      Rate and Rhythm: Normal rate and regular rhythm.   Pulmonary:      Effort: Pulmonary effort is normal. No respiratory distress.      Breath sounds: Normal breath sounds. No stridor. No wheezing or rales.   Musculoskeletal:         General: Normal range of motion.      Cervical back: Normal range of motion.      Right lower leg: No edema.      Left lower leg: No edema.      Right ankle: Normal.      Right foot: Normal range of motion and normal capillary refill. Swelling and tenderness present. Normal pulse.   Feet:      Left foot:      Toenail Condition: Left toenails are ingrown.   Skin:     General: Skin is warm and dry.   Neurological:      General: No focal deficit present.      Mental Status: He is alert. Mental status is at baseline.                           Assessment & Plan           1. RTI (respiratory tract infection)  - albuterol (PROAIR HFA) 108 (90 Base) MCG/ACT Aero Soln inhalation aerosol; Inhale 2 Puffs every four hours as needed for Shortness of Breath.  Dispense: 8.5 g; Refill: 0  - azithromycin (ZITHROMAX) 250 MG Tab; Take 2 tablets (500 mg) PO on day 1 and then take 1 tablet (250 mg) daily on 2-5  Dispense: 6 Tablet; Refill: 0    2. Mild asthma with exacerbation, unspecified whether persistent  - albuterol (PROAIR HFA) 108 (90 Base) MCG/ACT Aero Soln inhalation aerosol; Inhale 2 Puffs every four hours as needed for Shortness of Breath.  Dispense: 8.5 g; Refill: 0  - SpO2 is 95% on room air.  -Pulmonary exam with normal effort and breath sounds bilaterally.  No  wheezes, rales, rhonchi.    3. Paronychia of great toe, left  - Referral to Podiatry  - mupirocin (BACTROBAN) 2 % ointment  -Warm water soaks with topical application of Bactroban 2 times a day.  Referral to podiatry placed.  Patient to follow-up with podiatry/PCP.    4. Elevated blood pressure reading  - BP elevated in clinic at 146/92.  Lifestyle/dietary recommendations.  Educational printout on managing hypertension.  BP log recommended.  Patient to follow-up with primary care provider.    5. Essential hypertension    Differential diagnoses, supportive care, and indications for immediate follow-up discussed with patient. Pathogenesis of diagnosis discussed including typical length and natural progression.  See AVS.    My total time spent caring for the patient on the day of the encounter was 32 minutes including patient education, reviewing patient's PMH, reviewing supportive care measures, addressing questions/concerns, educating on proper follow-up and ER precautions.This does not include time spent on separately billable procedures/tests.     Instructed to return to urgent care or nearest emergency department if symptoms fail to improve, for any change in condition, further concerns, or new concerning symptoms.    Patient states understanding and agrees with the plan of care and discharge instructions.

## 2023-01-20 ENCOUNTER — OFFICE VISIT (OUTPATIENT)
Dept: MEDICAL GROUP | Facility: LAB | Age: 48
End: 2023-01-20
Payer: COMMERCIAL

## 2023-01-20 VITALS
RESPIRATION RATE: 16 BRPM | BODY MASS INDEX: 38.43 KG/M2 | HEIGHT: 73 IN | SYSTOLIC BLOOD PRESSURE: 140 MMHG | DIASTOLIC BLOOD PRESSURE: 90 MMHG | HEART RATE: 68 BPM | OXYGEN SATURATION: 97 % | WEIGHT: 290 LBS | TEMPERATURE: 96.9 F

## 2023-01-20 DIAGNOSIS — Z12.11 COLON CANCER SCREENING: ICD-10-CM

## 2023-01-20 DIAGNOSIS — E55.9 VITAMIN D DEFICIENCY: ICD-10-CM

## 2023-01-20 DIAGNOSIS — R39.9 LOWER URINARY TRACT SYMPTOMS: ICD-10-CM

## 2023-01-20 DIAGNOSIS — M54.12 CERVICAL RADICULOPATHY: ICD-10-CM

## 2023-01-20 PROCEDURE — 99214 OFFICE O/P EST MOD 30 MIN: CPT | Performed by: PHYSICIAN ASSISTANT

## 2023-01-20 ASSESSMENT — FIBROSIS 4 INDEX: FIB4 SCORE: 0.46

## 2023-01-20 NOTE — PROGRESS NOTES
Subjective:     CC: 3 mo f/u    HPI:   Chaitanya here today with     Vit D Def  Not taking supplement currently    Cervical radiculopathy  Pt states he will likely follow up with original surgeon ([facility]) but states there has been insurance issues with getting an updated MRI order.  Was prev referred to pain management but did not f/u with appt  Prev eval with PT  Current management with gabapentin 300mg BID  He has been trying to modify activity to limit pain.     Appt with podiatry  -appt for ingrown toenail removal coming up    Pt reports the past year has had abnormal urinary symptoms. He states when he sits for prolonged periods and stands suddenly he feels an urge to urinate. Denies weak stream, difficulty starting stream, nocturia. He also notes issues with maintaining and erection     ROS:  Gen: no fevers/chills, no changes in weight  Eyes: no changes in vision  ENT: no sore throat, no hearing loss, no bloody nose  Pulm: no sob, no cough  CV: no chest pain, no palpitations  GI: no nausea/vomiting, no diarrhea  : no dysuria  MSk: no myalgias  Skin: no rash  Neuro: no headaches, no numbness/tingling  Heme/Lymph: no easy bruising    Current Outpatient Medications Ordered in Epic   Medication Sig Dispense Refill    albuterol (PROAIR HFA) 108 (90 Base) MCG/ACT Aero Soln inhalation aerosol Inhale 2 Puffs every four hours as needed for Shortness of Breath. 8.5 g 0    azithromycin (ZITHROMAX) 250 MG Tab Take 2 tablets (500 mg) PO on day 1 and then take 1 tablet (250 mg) daily on 2-5 6 Tablet 0    benzonatate (TESSALON) 100 MG Cap Take 1 Capsule by mouth 3 times a day as needed for Cough. 30 Capsule 0    verapamil ER (CALAN-SR) 240 MG Tab CR Take 1 Tablet by mouth every day. 90 Tablet 1    doxazosin (CARDURA) 2 MG Tab Take 1 Tablet by mouth at bedtime. 90 Tablet 1    gabapentin (NEURONTIN) 300 MG Cap Take 1 Capsule by mouth 2 times a day. 60 Capsule 3    lamotrigine (LAMICTAL) 200 MG tablet Take 1 Tablet by  "mouth every day.      famotidine (PEPCID) 20 MG Tab Take 1 Tablet by mouth.      acetaminophen (TYLENOL) 500 MG Tab Take 2 Tabs by mouth 3 times a day as needed (pain.  Do not exceed 3000 mg of total acetaminophen per day). 180 Tab 6    buPROPion (WELLBUTRIN XL) 150 MG XL tablet Take 1 Tab by mouth 2 Times a Day. 90 Tab 0    lithium CR (ESKALITH CR) 450 MG Tab CR TAKE 1 TABLET BY MOUTH TWICE DAILY 180 Tab 1     No current Epic-ordered facility-administered medications on file.         Objective:     Exam:  BP (!) 140/90   Pulse 68   Temp 36.1 °C (96.9 °F)   Resp 16   Ht 1.854 m (6' 1\")   Wt (!) 132 kg (290 lb)   SpO2 97%   BMI 38.26 kg/m²  Body mass index is 38.26 kg/m².    Gen: Alert and oriented, No apparent distress.  Neck: Neck is supple without lymphadenopathy.  Lungs: Normal effort, CTA bilaterally, no wheezes, rhonchi, or rales  CV: Regular rate and rhythm. No murmurs, rubs, or gallops.  Ext: No clubbing, cyanosis, edema.      Assessment & Plan:     47 y.o. male with the following -     1. Vitamin D deficiency  Encouraged pt to continue supplementation. We will recheck Vit D levels  - VITAMIN D,25 HYDROXY (DEFICIENCY); Future    2. Cervical radiculopathy  Chronic condition  Continue current medications  Encouraged pt to f/u with Pain Management and Neurosurg    3. Lower urinary tract symptoms  Check labs, tx pending results  Consider referral to urology  - PROSTATE SPECIFIC AG SCREENING; Future  - URINALYSIS,CULTURE IF INDICATED; Future    4. Colon cancer screening  - Referral to GI for Colonoscopy      I spent a total of 20 minutes with record review, exam, communication with the patient, communication with other providers, and documentation of this encounter.      Return in about 3 months (around 4/20/2023).    Please note that this dictation was created using voice recognition software. I have made every reasonable attempt to correct obvious errors, but there may be errors of grammar and possibly " content that I did not discover before finalizing the note.

## 2023-02-14 DIAGNOSIS — M54.12 CERVICAL RADICULOPATHY: ICD-10-CM

## 2023-02-14 RX ORDER — GABAPENTIN 300 MG/1
300 CAPSULE ORAL 2 TIMES DAILY
Qty: 60 CAPSULE | Refills: 3 | Status: SHIPPED | OUTPATIENT
Start: 2023-02-14 | End: 2023-08-09

## 2023-02-14 NOTE — TELEPHONE ENCOUNTER
Received request via: Pharmacy    Was the patient seen in the last year in this department? Yes  1/20/23  Does the patient have an active prescription (recently filled or refills available) for medication(s) requested? No    Does the patient have shelter Plus and need 100 day supply (blood pressure, diabetes and cholesterol meds only)? Medication is not for cholesterol, blood pressure or diabetes

## 2023-04-22 ENCOUNTER — OFFICE VISIT (OUTPATIENT)
Dept: URGENT CARE | Facility: PHYSICIAN GROUP | Age: 48
End: 2023-04-22
Payer: COMMERCIAL

## 2023-04-22 VITALS
TEMPERATURE: 98.1 F | SYSTOLIC BLOOD PRESSURE: 150 MMHG | BODY MASS INDEX: 44.38 KG/M2 | HEIGHT: 70 IN | WEIGHT: 310 LBS | HEART RATE: 85 BPM | RESPIRATION RATE: 20 BRPM | OXYGEN SATURATION: 95 % | DIASTOLIC BLOOD PRESSURE: 82 MMHG

## 2023-04-22 DIAGNOSIS — J34.89 NASAL CONGESTION WITH RHINORRHEA: ICD-10-CM

## 2023-04-22 DIAGNOSIS — J06.9 VIRAL URI WITH COUGH: ICD-10-CM

## 2023-04-22 DIAGNOSIS — I10 ELEVATED BLOOD PRESSURE READING IN OFFICE WITH DIAGNOSIS OF HYPERTENSION: ICD-10-CM

## 2023-04-22 DIAGNOSIS — J02.9 SORE THROAT: ICD-10-CM

## 2023-04-22 DIAGNOSIS — R09.81 NASAL CONGESTION WITH RHINORRHEA: ICD-10-CM

## 2023-04-22 DIAGNOSIS — R53.83 OTHER FATIGUE: ICD-10-CM

## 2023-04-22 LAB
FLUAV RNA SPEC QL NAA+PROBE: NEGATIVE
FLUBV RNA SPEC QL NAA+PROBE: NEGATIVE
RSV RNA SPEC QL NAA+PROBE: NEGATIVE
S PYO DNA SPEC NAA+PROBE: NOT DETECTED
SARS-COV-2 RNA RESP QL NAA+PROBE: NEGATIVE

## 2023-04-22 PROCEDURE — 99213 OFFICE O/P EST LOW 20 MIN: CPT | Performed by: NURSE PRACTITIONER

## 2023-04-22 PROCEDURE — 0241U POCT CEPHEID COV-2, FLU A/B, RSV - PCR: CPT | Performed by: NURSE PRACTITIONER

## 2023-04-22 PROCEDURE — 87651 STREP A DNA AMP PROBE: CPT | Performed by: NURSE PRACTITIONER

## 2023-04-22 ASSESSMENT — ENCOUNTER SYMPTOMS
HEADACHES: 0
VOMITING: 0
DIZZINESS: 0
FEVER: 0
SORE THROAT: 1
WEAKNESS: 0
ABDOMINAL PAIN: 0
CHILLS: 0
NECK PAIN: 0
SINUS PAIN: 0
EYE DISCHARGE: 0
NAUSEA: 0
CONSTIPATION: 0
MYALGIAS: 1
DIARRHEA: 0
EYE REDNESS: 0
WHEEZING: 0
COUGH: 1
SHORTNESS OF BREATH: 0

## 2023-04-22 ASSESSMENT — FIBROSIS 4 INDEX: FIB4 SCORE: 0.46

## 2023-04-22 NOTE — PROGRESS NOTES
Subjective     Chaitanya Kelly is a 47 y.o. male who presents with Cough (Coughing up phlegm X 2 days.) and Pharyngitis (Sore throat X 4 days)            Cough  Associated symptoms include myalgias and a sore throat. Pertinent negatives include no chills, ear pain, eye redness, fever, headaches, rash, shortness of breath or wheezing. There is no history of environmental allergies.   Pharyngitis   Associated symptoms include congestion and coughing. Pertinent negatives include no abdominal pain, diarrhea, ear pain, headaches, neck pain, shortness of breath or vomiting.   States has been experiencing nasal congestion, runny nose, sneezing, postnasal drainage, sore throat, cough x4 days.  Fatigue, malaise.  No fever, nausea, vomiting or diarrhea noted.  Taking no over-the-counter medications at this time for symptoms.  History of asthma but does not suffer from this on regular basis and is having no problems with this at this time.    PMH:  has a past medical history of Anxiety, Arthritis, ASTHMA, Bipolar 2 disorder, Bipolar disorder ([facility]), Headache(784.0), Pain (10/10/2017), and Seizure ([facility]) (1980).  MEDS:   Current Outpatient Medications:     albuterol (PROAIR HFA) 108 (90 Base) MCG/ACT Aero Soln inhalation aerosol, Inhale 2 Puffs every four hours as needed for Shortness of Breath., Disp: 8.5 g, Rfl: 0    verapamil ER (CALAN-SR) 240 MG Tab CR, Take 1 Tablet by mouth every day., Disp: 90 Tablet, Rfl: 1    doxazosin (CARDURA) 2 MG Tab, Take 1 Tablet by mouth at bedtime., Disp: 90 Tablet, Rfl: 1    acetaminophen (TYLENOL) 500 MG Tab, Take 2 Tabs by mouth 3 times a day as needed (pain.  Do not exceed 3000 mg of total acetaminophen per day)., Disp: 180 Tab, Rfl: 6    buPROPion (WELLBUTRIN XL) 150 MG XL tablet, Take 1 Tab by mouth 2 Times a Day., Disp: 90 Tab, Rfl: 0    lithium CR (ESKALITH CR) 450 MG Tab CR, TAKE 1 TABLET BY MOUTH TWICE DAILY, Disp: 180 Tab, Rfl: 1    gabapentin (NEURONTIN) 300 MG Cap, Take 1 Capsule  by mouth 2 times a day. (Patient not taking: Reported on 4/22/2023), Disp: 60 Capsule, Rfl: 3    azithromycin (ZITHROMAX) 250 MG Tab, Take 2 tablets (500 mg) PO on day 1 and then take 1 tablet (250 mg) daily on 2-5 (Patient not taking: Reported on 4/22/2023), Disp: 6 Tablet, Rfl: 0    benzonatate (TESSALON) 100 MG Cap, Take 1 Capsule by mouth 3 times a day as needed for Cough. (Patient not taking: Reported on 4/22/2023), Disp: 30 Capsule, Rfl: 0    lamotrigine (LAMICTAL) 200 MG tablet, Take 1 Tablet by mouth every day. (Patient not taking: Reported on 4/22/2023), Disp: , Rfl:     famotidine (PEPCID) 20 MG Tab, Take 1 Tablet by mouth. (Patient not taking: Reported on 4/22/2023), Disp: , Rfl:   ALLERGIES:   Allergies   Allergen Reactions    Ibuprofen Unspecified     Interaction with Lithium    Latex Itching    Naprosyn [Naproxen] Unspecified     Interaction with Lithium     SURGHX:   Past Surgical History:   Procedure Laterality Date    CERVICAL DISK AND FUSION ANTERIOR  06/10/2021    BLOCK EPIDURAL STEROID INJECTION N/A 12/11/2020    Procedure: INJECTION, STEROID, EPIDURAL;  Surgeon: Roderick Best M.D.;  [facility];  Service: Pain Management    UMBILICAL HERNIA REPAIR N/A 10/18/2017    Procedure: UMBILICAL HERNIA REPAIR;  Surgeon: Chinedu Kamara M.D.;  Location: [facility];  Service: General    LAMINOTOMY      OTHER      UMBILICAL HERNIA REPAIR       SOCHX:  reports that he quit smoking about 17 years ago. His smoking use included cigarettes. He has never used smokeless tobacco. He reports that he does not drink alcohol and does not use drugs.  FH: Family history was reviewed, no pertinent findings to report    Review of Systems   Constitutional:  Positive for malaise/fatigue. Negative for chills and fever.   HENT:  Positive for congestion and sore throat. Negative for ear pain and sinus pain.    Eyes:  Negative for discharge and redness.   Respiratory:  Positive for  "cough. Negative for shortness of breath and wheezing.    Gastrointestinal:  Negative for abdominal pain, constipation, diarrhea, nausea and vomiting.   Musculoskeletal:  Positive for myalgias. Negative for neck pain.   Skin:  Negative for itching and rash.   Neurological:  Negative for dizziness, weakness and headaches.   Endo/Heme/Allergies:  Negative for environmental allergies.   All other systems reviewed and are negative.           Objective     BP (!) 150/82   Pulse 85   Temp 36.7 °C (98.1 °F) (Temporal)   Resp 20   Ht 1.778 m (5' 10\")   Wt (!) 141 kg (310 lb)   SpO2 95%   BMI 44.48 kg/m²      Physical Exam  Vitals reviewed.   Constitutional:       General: He is awake. He is not in acute distress.     Appearance: Normal appearance. He is well-developed. He is not ill-appearing, toxic-appearing or diaphoretic.   HENT:      Head: Normocephalic.      Right Ear: Ear canal and external ear normal. A middle ear effusion is present.      Left Ear: Ear canal and external ear normal. A middle ear effusion is present.      Nose: Mucosal edema, congestion and rhinorrhea present.      Mouth/Throat:      Lips: Pink.      Mouth: Mucous membranes are dry.      Pharynx: Uvula midline. Pharyngeal swelling and posterior oropharyngeal erythema present. No oropharyngeal exudate or uvula swelling.      Tonsils: No tonsillar exudate or tonsillar abscesses.   Eyes:      Conjunctiva/sclera: Conjunctivae normal.      Pupils: Pupils are equal, round, and reactive to light.   Cardiovascular:      Rate and Rhythm: Normal rate.   Pulmonary:      Effort: Pulmonary effort is normal.      Breath sounds: Normal breath sounds and air entry.   Musculoskeletal:         General: Normal range of motion.      Cervical back: Normal range of motion and neck supple.   Skin:     General: Skin is warm and dry.   Neurological:      Mental Status: He is alert and oriented to person, place, and time.   Psychiatric:         Attention and Perception: " Attention normal.         Mood and Affect: Mood normal.         Speech: Speech normal.         Behavior: Behavior normal. Behavior is cooperative.                           Assessment & Plan          1. Sore throat    - POCT GROUP A STREP, PCR  - POCT CoV-2, Flu A/B, RSV by PCR    2. Nasal congestion with rhinorrhea    - POCT CoV-2, Flu A/B, RSV by PCR    3. Other fatigue    - POCT CoV-2, Flu A/B, RSV by PCR    4. Viral URI with cough      5. Elevated blood pressure reading in office with diagnosis of hypertension  -Take medications as prescribed for this  -Follow-up with primary care provider for management of hypertension  -Avoid any over-the-counter multisymptom oral decongestants     -Maintain hydration/water intake  -May use over the counter Tylenol as needed for fever  -May use humidifier/vaporizer at home as needed for dry cough/nasal passages  -Gargle salt water or throat lozenges as needed for throat irritation/dry cough  -Get rest  -May use daily longer acting antihistamine as needed (no decongestant) for any post nasal drainage   -May use saline nasal spray/Flonase as needed to flush any nasal congestion/post nasal drainage   -May take over-the-counter plain Robitussin or Delsym as needed for cough  -Monitor for fevers, worse cough, phlegm, shortness of breath, wheeze, chest tightness- need re-evaluation in urgent care    MyChart release of test results, patient notified

## 2023-04-22 NOTE — LETTER
April 22, 2023       Patient: Chaitanya Kelly   YOB: 1975   Date of Visit: 4/22/2023         To Whom It May Concern:    In my medical opinion, I recommend that Chaitanya Kelly be excused from work tomorrow as he was evaluated in clinic today.     If you have any questions or concerns, please don't hesitate to call [phone]          Sincerely,          Keke Harrison A.P.R.N.  Electronically Signed

## 2023-04-30 DIAGNOSIS — I10 ESSENTIAL HYPERTENSION: ICD-10-CM

## 2023-05-01 RX ORDER — DOXAZOSIN 2 MG/1
2 TABLET ORAL
Qty: 90 TABLET | Refills: 0 | Status: SHIPPED | OUTPATIENT
Start: 2023-05-01 | End: 2023-07-24

## 2023-05-26 ENCOUNTER — OFFICE VISIT (OUTPATIENT)
Dept: MEDICAL GROUP | Facility: LAB | Age: 48
End: 2023-05-26
Payer: COMMERCIAL

## 2023-05-26 VITALS
TEMPERATURE: 97.3 F | DIASTOLIC BLOOD PRESSURE: 90 MMHG | SYSTOLIC BLOOD PRESSURE: 134 MMHG | WEIGHT: 289 LBS | HEART RATE: 70 BPM | HEIGHT: 73 IN | RESPIRATION RATE: 16 BRPM | BODY MASS INDEX: 38.3 KG/M2 | OXYGEN SATURATION: 96 %

## 2023-05-26 DIAGNOSIS — D49.2 SKIN NEOPLASM: ICD-10-CM

## 2023-05-26 DIAGNOSIS — E66.9 OBESITY (BMI 30-39.9): ICD-10-CM

## 2023-05-26 DIAGNOSIS — I10 ESSENTIAL HYPERTENSION: ICD-10-CM

## 2023-05-26 DIAGNOSIS — E55.9 VITAMIN D DEFICIENCY: ICD-10-CM

## 2023-05-26 DIAGNOSIS — E78.5 DYSLIPIDEMIA: ICD-10-CM

## 2023-05-26 DIAGNOSIS — M25.571 RIGHT ANKLE PAIN, UNSPECIFIED CHRONICITY: ICD-10-CM

## 2023-05-26 PROCEDURE — 3075F SYST BP GE 130 - 139MM HG: CPT | Performed by: PHYSICIAN ASSISTANT

## 2023-05-26 PROCEDURE — 3080F DIAST BP >= 90 MM HG: CPT | Performed by: PHYSICIAN ASSISTANT

## 2023-05-26 PROCEDURE — 99214 OFFICE O/P EST MOD 30 MIN: CPT | Performed by: PHYSICIAN ASSISTANT

## 2023-05-26 ASSESSMENT — FIBROSIS 4 INDEX: FIB4 SCORE: 0.46

## 2023-05-26 ASSESSMENT — PATIENT HEALTH QUESTIONNAIRE - PHQ9: CLINICAL INTERPRETATION OF PHQ2 SCORE: 0

## 2023-05-26 NOTE — PROGRESS NOTES
"Subjective:     CC: 3 mo f/u    HPI:   Chaitanya here today with     Ankle Pain  Pt reports 1-2 weeks ago he developed R ankle pain. Pt describes as a sharp stabbing type pain. Feels like Achilles tendon is \"tight\". Denies recent falls or injuries.  Denies swelling or discoloration.  Worse with walking. Pt walks a lot with his job.     Skin Lesion  New darkly pigmented lesion on lateral aspect of R ankle. Denies ulceration, tenderness. He relates some significant sun exposure as an adult. Denies significant sunburns in childhood    Vit D Def  Not taking supplement currently  Due for updated labs     Cervical radiculopathy  There has been insurance issues with getting an updated MRI order. Pt feels like he is not in a position financially to proceed with this and [facility] eval at this time.   Was prev referred to pain management but did not f/u with appt  Prev eval with PT  Current management with gabapentin 300mg BID  He has been trying to modify activity to limit pain.   Symptoms stable at this time.     ROS:  ROS negative except as indicated above    Current Outpatient Medications Ordered in Epic   Medication Sig Dispense Refill    doxazosin (CARDURA) 2 MG Tab TAKE 1 TABLET BY MOUTH AT BEDTIME 90 Tablet 0    gabapentin (NEURONTIN) 300 MG Cap Take 1 Capsule by mouth 2 times a day. (Patient not taking: Reported on 4/22/2023) 60 Capsule 3    albuterol (PROAIR HFA) 108 (90 Base) MCG/ACT Aero Soln inhalation aerosol Inhale 2 Puffs every four hours as needed for Shortness of Breath. 8.5 g 0    verapamil ER (CALAN-SR) 240 MG Tab CR Take 1 Tablet by mouth every day. 90 Tablet 1    lamotrigine (LAMICTAL) 200 MG tablet Take 1 Tablet by mouth every day. (Patient not taking: Reported on 4/22/2023)      acetaminophen (TYLENOL) 500 MG Tab Take 2 Tabs by mouth 3 times a day as needed (pain.  Do not exceed 3000 mg of total acetaminophen per day). 180 Tab 6    buPROPion (WELLBUTRIN XL) 150 MG XL tablet Take 1 Tab by mouth 2 Times " "a Day. 90 Tab 0    lithium CR (ESKALITH CR) 450 MG Tab CR TAKE 1 TABLET BY MOUTH TWICE DAILY 180 Tab 1     No current Epic-ordered facility-administered medications on file.         Objective:     Exam:  BP (!) 134/90   Pulse 70   Temp 36.3 °C (97.3 °F)   Resp 16   Ht 1.854 m (6' 1\")   Wt (!) 131 kg (289 lb)   SpO2 96%   BMI 38.13 kg/m²  Body mass index is 38.13 kg/m².    Gen: Alert and oriented, No apparent distress.  Neck: Neck is supple without lymphadenopathy.  Lungs: Normal effort, CTA bilaterally, no wheezes, rhonchi, or rales  CV: Regular rate and rhythm. No murmurs, rubs, or gallops.  Ext: No clubbing, cyanosis, edema.  MSK: Patient is able to bear weight on both feet.   Ankles: RIGHT: No noticeable deformity, swelling or bruising.  Approximately half centimeter diameter slightly raised deeply pigmented region with central clearing.  Nontender.  ROM intact. Tenderness to palpation on on posterior aspect of lateral malleolus. No tenderness to palpation along , base of 5th metatarsal or navicular bone. No tenderness along fibula. Squeeze test negative. External rotation test negative. Anterior drawer negative. Talar tilt negative. 5/5 strength bilaterally. Sensation intact. 2+ pedal pulses. Ankle reflex 2+.   Ankles: LEFT: No noticeable deformity, swelling or bruising. ROM intact.  No tenderness to palpation. No tenderness to palpation along posterior edge of lateral and medial malleoli, base of 5th metatarsal or navicular bone. No tenderness along fibula. Squeeze test negative. External rotation test negative. Anterior drawer negative. Talar tilt negative. 5/5 strength bilaterally. Sensation intact. 2+ pedal pulses. Ankle reflex 2+.       Assessment & Plan:     47 y.o. male with the following -     1. Skin neoplasm  New problem  Referral to dermatology for further evaluation  - Referral to Dermatology    2. Right ankle pain, unspecified chronicity  New problem  Patient is unable to tolerate NSAIDs.  We " will trial topical diclofenac as well as Tylenol.  We also get updated imaging of the ankle for further evaluation.  Consider PT referral if symptoms persist despite conservative treatment  - DX-ANKLE 2- VIEWS RIGHT; Future    3. Vitamin D deficiency  - VITAMIN D,25 HYDROXY (DEFICIENCY); Future    4. Obesity (BMI 30-39.9)  Patient's weight was discussed today, including healthy low-carb diet, 30-minutes of moderate exercise daily and avoiding sugars and high-fat foods.    - HEMOGLOBIN A1C; Future    5. Essential hypertension  Chronic condition, stable  Blood pressure slightly elevated today, attributed to ankle pain.  Patient will continue to monitor  - CBC WITH DIFFERENTIAL; Future  - Comp Metabolic Panel; Future  - TSH WITH REFLEX TO FT4; Future    6. Dyslipidemia  Chronic condition, stable  Due for updated labs  - CBC WITH DIFFERENTIAL; Future  - Comp Metabolic Panel; Future  - Lipid Profile; Future        I spent a total of 22 minutes with record review, exam, communication with the patient, communication with other providers, and documentation of this encounter.      Return in about 3 months (around 8/26/2023).    Please note that this dictation was created using voice recognition software. I have made every reasonable attempt to correct obvious errors, but there may be errors of grammar and possibly content that I did not discover before finalizing the note.

## 2023-05-27 ENCOUNTER — HOSPITAL ENCOUNTER (OUTPATIENT)
Dept: RADIOLOGY | Facility: MEDICAL CENTER | Age: 48
End: 2023-05-27
Attending: PHYSICIAN ASSISTANT
Payer: COMMERCIAL

## 2023-05-27 DIAGNOSIS — M25.571 RIGHT ANKLE PAIN, UNSPECIFIED CHRONICITY: ICD-10-CM

## 2023-05-27 PROCEDURE — 73600 X-RAY EXAM OF ANKLE: CPT | Mod: RT

## 2023-06-11 DIAGNOSIS — I10 ESSENTIAL HYPERTENSION: ICD-10-CM

## 2023-06-12 RX ORDER — VERAPAMIL HYDROCHLORIDE 240 MG/1
TABLET, FILM COATED, EXTENDED RELEASE ORAL
Qty: 90 TABLET | Refills: 0 | Status: SHIPPED | OUTPATIENT
Start: 2023-06-12 | End: 2023-09-01

## 2023-06-12 NOTE — TELEPHONE ENCOUNTER
Received request via: Pharmacy    Was the patient seen in the last year in this department? Yes  LOV 05/26/2023  Does the patient have an active prescription (recently filled or refills available) for medication(s) requested? No    Does the patient have nursing home Plus and need 100 day supply (blood pressure, diabetes and cholesterol meds only)? Medication is not for cholesterol, blood pressure or diabetes and Patient does not have SCP

## 2023-07-22 DIAGNOSIS — I10 ESSENTIAL HYPERTENSION: ICD-10-CM

## 2023-07-24 RX ORDER — DOXAZOSIN 2 MG/1
2 TABLET ORAL
Qty: 90 TABLET | Refills: 0 | Status: SHIPPED | OUTPATIENT
Start: 2023-07-24 | End: 2023-10-18

## 2023-07-24 NOTE — TELEPHONE ENCOUNTER
Received request via: Pharmacy    Was the patient seen in the last year in this department? Yes  LOV: 5/26/2023  Does the patient have an active prescription (recently filled or refills available) for medication(s) requested? No    Does the patient have residential Plus and need 100 day supply (blood pressure, diabetes and cholesterol meds only)? Patient does not have SCP

## 2023-08-09 DIAGNOSIS — M54.12 CERVICAL RADICULOPATHY: ICD-10-CM

## 2023-08-09 RX ORDER — GABAPENTIN 300 MG/1
300 CAPSULE ORAL 2 TIMES DAILY
Qty: 60 CAPSULE | Refills: 0 | Status: SHIPPED | OUTPATIENT
Start: 2023-08-09 | End: 2023-09-05

## 2023-08-09 NOTE — TELEPHONE ENCOUNTER
Received request via: Pharmacy    Was the patient seen in the last year in this department? Yes  5/26/23  Does the patient have an active prescription (recently filled or refills available) for medication(s) requested? No    Does the patient have care home Plus and need 100 day supply (blood pressure, diabetes and cholesterol meds only)? Medication is not for cholesterol, blood pressure or diabetes

## 2023-08-11 ENCOUNTER — APPOINTMENT (OUTPATIENT)
Dept: MEDICAL GROUP | Facility: LAB | Age: 48
End: 2023-08-11
Payer: COMMERCIAL

## 2023-09-01 DIAGNOSIS — I10 ESSENTIAL HYPERTENSION: ICD-10-CM

## 2023-09-01 RX ORDER — VERAPAMIL HYDROCHLORIDE 240 MG/1
TABLET, FILM COATED, EXTENDED RELEASE ORAL
Qty: 90 TABLET | Refills: 0 | Status: SHIPPED | OUTPATIENT
Start: 2023-09-01 | End: 2023-12-01

## 2023-09-01 NOTE — TELEPHONE ENCOUNTER
Received request via: Pharmacy    Was the patient seen in the last year in this department? Yes  5/26/23  Does the patient have an active prescription (recently filled or refills available) for medication(s) requested? No    Does the patient have shelter Plus and need 100 day supply (blood pressure, diabetes and cholesterol meds only)? Medication is not for cholesterol, blood pressure or diabetes

## 2023-09-02 DIAGNOSIS — M54.12 CERVICAL RADICULOPATHY: ICD-10-CM

## 2023-09-05 RX ORDER — GABAPENTIN 300 MG/1
300 CAPSULE ORAL 2 TIMES DAILY
Qty: 60 CAPSULE | Refills: 0 | Status: SHIPPED | OUTPATIENT
Start: 2023-09-05 | End: 2023-10-06

## 2023-09-05 NOTE — TELEPHONE ENCOUNTER
Received request via: Pharmacy    Was the patient seen in the last year in this department? Yes    Does the patient have an active prescription (recently filled or refills available) for medication(s) requested? Yes.  Does the patient have snf Plus and need 100 day supply (blood pressure, diabetes and cholesterol meds only)? Patient does not have SCP

## 2023-10-06 DIAGNOSIS — M54.12 CERVICAL RADICULOPATHY: ICD-10-CM

## 2023-10-06 RX ORDER — GABAPENTIN 300 MG/1
300 CAPSULE ORAL 2 TIMES DAILY
Qty: 60 CAPSULE | Refills: 0 | Status: SHIPPED | OUTPATIENT
Start: 2023-10-06 | End: 2023-11-08

## 2023-10-18 DIAGNOSIS — I10 ESSENTIAL HYPERTENSION: ICD-10-CM

## 2023-10-18 RX ORDER — DOXAZOSIN 2 MG/1
2 TABLET ORAL
Qty: 90 TABLET | Refills: 0 | Status: SHIPPED | OUTPATIENT
Start: 2023-10-18 | End: 2024-01-22

## 2023-10-18 NOTE — TELEPHONE ENCOUNTER
Received request via: Pharmacy    Was the patient seen in the last year in this department? Yes    Does the patient have an active prescription (recently filled or refills available) for medication(s) requested? yes    Does the patient have custodial Plus and need 100 day supply (blood pressure, diabetes and cholesterol meds only)? Patient does not have SCP   Requested Prescriptions     Pending Prescriptions Disp Refills    doxazosin (CARDURA) 2 MG Tab [Pharmacy Med Name: Doxazosin Mesylate 2 MG Oral Tablet] 90 Tablet 0     Sig: TAKE 1 TABLET BY MOUTH AT BEDTIME

## 2023-10-29 ENCOUNTER — OFFICE VISIT (OUTPATIENT)
Dept: URGENT CARE | Facility: PHYSICIAN GROUP | Age: 48
End: 2023-10-29
Payer: COMMERCIAL

## 2023-10-29 VITALS
BODY MASS INDEX: 37.77 KG/M2 | OXYGEN SATURATION: 97 % | HEART RATE: 64 BPM | TEMPERATURE: 97.3 F | DIASTOLIC BLOOD PRESSURE: 88 MMHG | RESPIRATION RATE: 18 BRPM | WEIGHT: 285 LBS | SYSTOLIC BLOOD PRESSURE: 135 MMHG | HEIGHT: 73 IN

## 2023-10-29 DIAGNOSIS — S39.012A STRAIN OF LUMBAR REGION, INITIAL ENCOUNTER: ICD-10-CM

## 2023-10-29 DIAGNOSIS — M62.830 SPASM OF MUSCLE OF LOWER BACK: ICD-10-CM

## 2023-10-29 DIAGNOSIS — S16.1XXA STRAIN OF NECK MUSCLE, INITIAL ENCOUNTER: ICD-10-CM

## 2023-10-29 PROCEDURE — 3075F SYST BP GE 130 - 139MM HG: CPT

## 2023-10-29 PROCEDURE — 3079F DIAST BP 80-89 MM HG: CPT

## 2023-10-29 PROCEDURE — 99213 OFFICE O/P EST LOW 20 MIN: CPT

## 2023-10-29 RX ORDER — PREDNISONE 10 MG/1
20 TABLET ORAL DAILY
Qty: 10 TABLET | Refills: 0 | Status: SHIPPED | OUTPATIENT
Start: 2023-10-29 | End: 2023-11-03

## 2023-10-29 RX ORDER — CYCLOBENZAPRINE HCL 10 MG
10 TABLET ORAL 2 TIMES DAILY PRN
Qty: 14 TABLET | Refills: 0 | Status: SHIPPED | OUTPATIENT
Start: 2023-10-29 | End: 2023-11-05

## 2023-10-29 ASSESSMENT — ENCOUNTER SYMPTOMS
NECK PAIN: 1
TINGLING: 0
NUMBNESS: 1
BOWEL INCONTINENCE: 0
BACK PAIN: 1
WEAKNESS: 0
PARESTHESIAS: 0

## 2023-10-29 ASSESSMENT — FIBROSIS 4 INDEX: FIB4 SCORE: 0.47

## 2023-10-29 NOTE — PROGRESS NOTES
Subjective     Chaitanya Kelly is a 48 y.o. male who presents with Neck Injury (X 2 days pt. Was helping a neighbor move. His Low back pain, neck pain)            Neck Pain   This is a new problem. The current episode started in the past 7 days. The problem has been gradually improving. The pain is associated with lifting a heavy object. The pain is present in the right side. The quality of the pain is described as aching. The pain is at a severity of 3/10. Associated symptoms include numbness (right arm). Pertinent negatives include no tingling or weakness. He has tried heat, ice and acetaminophen for the symptoms.   Back Pain  This is a new problem. The current episode started in the past 7 days. The problem has been gradually improving since onset. The pain is present in the lumbar spine. The quality of the pain is described as aching. The pain does not radiate. The pain is at a severity of 6/10. The symptoms are aggravated by standing. Associated symptoms include numbness (right arm). Pertinent negatives include no bladder incontinence, bowel incontinence, paresthesias, tingling or weakness. He has tried heat and ice for the symptoms.     Patient presents with symptoms that started 2 days ago.  He reports moving and living room sat from her neighbor's apartment appears start experiencing pain on the neck and back.  Pain is slowly improving but still bothering him.  He is taking Tylenol, providing mild relief.  He reports that he is not able to take ibuprofen as he is taking lithium.      Patient's current problem list, medications, and past medical/surgical history were reviewed in Epic.    PMH:  has a past medical history of Anxiety, Arthritis, ASTHMA, Bipolar 2 disorder, Bipolar disorder ([facility]), Headache(784.0), Pain (10/10/2017), and Seizure ([facility]) (1980).  MEDS:   Current Outpatient Medications:     doxazosin (CARDURA) 2 MG Tab, TAKE 1 TABLET BY MOUTH AT BEDTIME, Disp: 90 Tablet, Rfl: 0    gabapentin  (NEURONTIN) 300 MG Cap, Take 1 capsule by mouth twice daily, Disp: 60 Capsule, Rfl: 0    verapamil ER (CALAN SR) 240 MG Tab CR, Take 1 tablet by mouth once daily, Disp: 90 Tablet, Rfl: 0    albuterol (PROAIR HFA) 108 (90 Base) MCG/ACT Aero Soln inhalation aerosol, Inhale 2 Puffs every four hours as needed for Shortness of Breath., Disp: 8.5 g, Rfl: 0    acetaminophen (TYLENOL) 500 MG Tab, Take 2 Tabs by mouth 3 times a day as needed (pain.  Do not exceed 3000 mg of total acetaminophen per day)., Disp: 180 Tab, Rfl: 6    buPROPion (WELLBUTRIN XL) 150 MG XL tablet, Take 1 Tab by mouth 2 Times a Day., Disp: 90 Tab, Rfl: 0    lithium CR (ESKALITH CR) 450 MG Tab CR, TAKE 1 TABLET BY MOUTH TWICE DAILY, Disp: 180 Tab, Rfl: 1  ALLERGIES:   Allergies   Allergen Reactions    Ibuprofen Unspecified     Interaction with Lithium    Latex Itching    Naprosyn [Naproxen] Unspecified     Interaction with Lithium     SURGHX:   Past Surgical History:   Procedure Laterality Date    CERVICAL DISK AND FUSION ANTERIOR  06/10/2021    BLOCK EPIDURAL STEROID INJECTION N/A 12/11/2020    Procedure: INJECTION, STEROID, EPIDURAL;  Surgeon: Roderick Best M.D.;  [facility];  Service: Pain Management    UMBILICAL HERNIA REPAIR N/A 10/18/2017    Procedure: UMBILICAL HERNIA REPAIR;  Surgeon: Chinedu Kamara M.D.;  Location: SURGERY [facility];  Service: General    LAMINOTOMY      OTHER      UMBILICAL HERNIA REPAIR       SOCHX:  reports that he quit smoking about 17 years ago. His smoking use included cigarettes. He has never used smokeless tobacco. He reports that he does not drink alcohol and does not use drugs.  FH: Reviewed with patient, not pertinent to this visit.       Review of Systems   Gastrointestinal:  Negative for bowel incontinence.   Genitourinary:  Negative for bladder incontinence.   Musculoskeletal:  Positive for back pain and neck pain.   Neurological:  Positive for numbness (right arm). Negative  "for tingling, weakness and paresthesias.   All other systems reviewed and are negative.             Objective     /88 (BP Location: Right arm, Patient Position: Sitting, BP Cuff Size: Adult)   Pulse 64   Temp 36.3 °C (97.3 °F) (Temporal)   Resp 18   Ht 1.854 m (6' 1\")   Wt (!) 129 kg (285 lb)   SpO2 97%   BMI 37.60 kg/m²       Physical Exam  Constitutional:       Appearance: Normal appearance.   HENT:      Head: Normocephalic.      Nose: Nose normal.   Eyes:      Extraocular Movements: Extraocular movements intact.   Cardiovascular:      Rate and Rhythm: Normal rate.      Pulses: Normal pulses.   Pulmonary:      Effort: Pulmonary effort is normal.   Musculoskeletal:      Cervical back: Spasms present. No tenderness. Decreased range of motion.      Lumbar back: Spasms present. No tenderness. Normal range of motion. Negative right straight leg raise test and negative left straight leg raise test.   Skin:     General: Skin is warm.   Neurological:      General: No focal deficit present.      Mental Status: He is alert.   Psychiatric:         Mood and Affect: Mood normal.         Behavior: Behavior normal.              Assessment & Plan        1. Strain of lumbar region, initial encounter    - predniSONE (DELTASONE) 10 MG Tab; Take 2 Tablets by mouth every day for 5 days.  Dispense: 10 Tablet; Refill: 0  - cyclobenzaprine (FLEXERIL) 10 mg Tab; Take 1 Tablet by mouth 2 times a day as needed for Moderate Pain or Muscle Spasms for up to 7 days.  Dispense: 14 Tablet; Refill: 0    2. Strain of neck muscle, initial encounter    - predniSONE (DELTASONE) 10 MG Tab; Take 2 Tablets by mouth every day for 5 days.  Dispense: 10 Tablet; Refill: 0  - cyclobenzaprine (FLEXERIL) 10 mg Tab; Take 1 Tablet by mouth 2 times a day as needed for Moderate Pain or Muscle Spasms for up to 7 days.  Dispense: 14 Tablet; Refill: 0    3. Spasm of muscle of lower back    - cyclobenzaprine (FLEXERIL) 10 mg Tab; Take 1 Tablet by mouth 2 " times a day as needed for Moderate Pain or Muscle Spasms for up to 7 days.  Dispense: 14 Tablet; Refill: 0          Patient's presentation is consistent with cervical and lumbosacral muscle strain.  He also has lower back spasm. Patient is prescribed prednisone daily for 5 days.  Educated on possible side effects, recommended taking this with food.  May take Flexeril daily at bedtime for muscle spasm. Recommended RICE (rest, ice, compression, elevation).   Advised to apply ice or heat to the area.  May apply topical analgesics or patches for additional pain relief. Discussed treatment plan with patient, he is agreeable and verbalized understanding.  Educated patient on signs and symptoms watch out for, when to return to clinic or go to the ER.    Electronically Signed by REJI Kilgore

## 2023-11-04 ENCOUNTER — OFFICE VISIT (OUTPATIENT)
Dept: URGENT CARE | Facility: PHYSICIAN GROUP | Age: 48
End: 2023-11-04
Payer: COMMERCIAL

## 2023-11-04 VITALS
HEIGHT: 72 IN | SYSTOLIC BLOOD PRESSURE: 130 MMHG | OXYGEN SATURATION: 98 % | WEIGHT: 284.39 LBS | BODY MASS INDEX: 38.52 KG/M2 | DIASTOLIC BLOOD PRESSURE: 78 MMHG | HEART RATE: 74 BPM | TEMPERATURE: 98 F | RESPIRATION RATE: 16 BRPM

## 2023-11-04 DIAGNOSIS — J02.9 SORE THROAT: ICD-10-CM

## 2023-11-04 DIAGNOSIS — J06.9 VIRAL URI: ICD-10-CM

## 2023-11-04 DIAGNOSIS — J34.89 NASAL CONGESTION WITH RHINORRHEA: ICD-10-CM

## 2023-11-04 DIAGNOSIS — R68.89 FLU-LIKE SYMPTOMS: ICD-10-CM

## 2023-11-04 DIAGNOSIS — R09.81 NASAL CONGESTION WITH RHINORRHEA: ICD-10-CM

## 2023-11-04 PROCEDURE — 3078F DIAST BP <80 MM HG: CPT | Performed by: NURSE PRACTITIONER

## 2023-11-04 PROCEDURE — 0241U POCT CEPHEID COV-2, FLU A/B, RSV - PCR: CPT | Performed by: NURSE PRACTITIONER

## 2023-11-04 PROCEDURE — 87651 STREP A DNA AMP PROBE: CPT | Performed by: NURSE PRACTITIONER

## 2023-11-04 PROCEDURE — 3075F SYST BP GE 130 - 139MM HG: CPT | Performed by: NURSE PRACTITIONER

## 2023-11-04 PROCEDURE — 99213 OFFICE O/P EST LOW 20 MIN: CPT | Performed by: NURSE PRACTITIONER

## 2023-11-04 RX ORDER — FLUTICASONE PROPIONATE 50 MCG
2 SPRAY, SUSPENSION (ML) NASAL DAILY
Qty: 9.9 ML | Refills: 0 | Status: SHIPPED | OUTPATIENT
Start: 2023-11-04

## 2023-11-04 ASSESSMENT — FIBROSIS 4 INDEX: FIB4 SCORE: 0.47

## 2023-11-04 ASSESSMENT — ENCOUNTER SYMPTOMS: COUGH: 1

## 2023-11-04 NOTE — PROGRESS NOTES
Subjective     Chaitanya Kelly is a 48 y.o. male who presents with Cough (Sore throat, body aches x3d)            Cough  Associated symptoms include chills, a fever, myalgias and a sore throat. Pertinent negatives include no ear pain, eye redness, headaches, rash, shortness of breath or wheezing. There is no history of environmental allergies.    has been experiencing body aches, malaise, chills with subjective fever, sore throat, mild nausea, but drinking fluids x 3 days. No salt water gargle, but taking over the counter  Tylenol.  just finished prednisone for back pain.  has been using Chlrohex mouth wash. Experiencing mild intermittent cough. No vomiting or diarrhea.     PMH:  has a past medical history of Anxiety, Arthritis, ASTHMA, Bipolar 2 disorder, Bipolar disorder ([facility]), Headache(784.0), Pain (10/10/2017), and Seizure ([facility]) (1980).  MEDS:   Current Outpatient Medications:     cyclobenzaprine (FLEXERIL) 10 mg Tab, Take 1 Tablet by mouth 2 times a day as needed for Moderate Pain or Muscle Spasms for up to 7 days., Disp: 14 Tablet, Rfl: 0    doxazosin (CARDURA) 2 MG Tab, TAKE 1 TABLET BY MOUTH AT BEDTIME, Disp: 90 Tablet, Rfl: 0    gabapentin (NEURONTIN) 300 MG Cap, Take 1 capsule by mouth twice daily, Disp: 60 Capsule, Rfl: 0    verapamil ER (CALAN SR) 240 MG Tab CR, Take 1 tablet by mouth once daily, Disp: 90 Tablet, Rfl: 0    albuterol (PROAIR HFA) 108 (90 Base) MCG/ACT Aero Soln inhalation aerosol, Inhale 2 Puffs every four hours as needed for Shortness of Breath., Disp: 8.5 g, Rfl: 0    acetaminophen (TYLENOL) 500 MG Tab, Take 2 Tabs by mouth 3 times a day as needed (pain.  Do not exceed 3000 mg of total acetaminophen per day)., Disp: 180 Tab, Rfl: 6    buPROPion (WELLBUTRIN XL) 150 MG XL tablet, Take 1 Tab by mouth 2 Times a Day., Disp: 90 Tab, Rfl: 0    lithium CR (ESKALITH CR) 450 MG Tab CR, TAKE 1 TABLET BY MOUTH TWICE DAILY, Disp: 180 Tab, Rfl: 1  ALLERGIES:   Allergies    Allergen Reactions    Ibuprofen Unspecified     Interaction with Lithium    Latex Itching    Naprosyn [Naproxen] Unspecified     Interaction with Lithium     SURGHX:   Past Surgical History:   Procedure Laterality Date    CERVICAL DISK AND FUSION ANTERIOR  06/10/2021    BLOCK EPIDURAL STEROID INJECTION N/A 12/11/2020    Procedure: INJECTION, STEROID, EPIDURAL;  Surgeon: Roderick Best M.D.;  [facility];  Service: Pain Management    UMBILICAL HERNIA REPAIR N/A 10/18/2017    Procedure: UMBILICAL HERNIA REPAIR;  Surgeon: Chinedu Kamara M.D.;  Location: SURGERY [facility];  Service: General    LAMINOTOMY      OTHER      UMBILICAL HERNIA REPAIR       SOCHX:  reports that he quit smoking about 17 years ago. His smoking use included cigarettes. He has never used smokeless tobacco. He reports that he does not drink alcohol and does not use drugs.  FH: Family history was reviewed, no pertinent findings to report      Review of Systems   Constitutional:  Positive for chills, fever and malaise/fatigue.   HENT:  Positive for congestion and sore throat. Negative for ear pain.    Eyes:  Negative for discharge and redness.   Respiratory:  Positive for cough. Negative for sputum production, shortness of breath and wheezing.    Gastrointestinal:  Positive for nausea. Negative for abdominal pain, constipation, diarrhea and vomiting.   Musculoskeletal:  Positive for myalgias. Negative for neck pain.   Skin:  Negative for itching and rash.   Neurological:  Negative for dizziness, weakness and headaches.   Endo/Heme/Allergies:  Negative for environmental allergies.   All other systems reviewed and are negative.             Objective     /78   Pulse 74   Temp 36.7 °C (98 °F) (Temporal)   Resp 16   Ht 1.829 m (6')   Wt (!) 129 kg (284 lb 6.3 oz)   SpO2 98%   BMI 38.57 kg/m²      Physical Exam  Vitals reviewed.   Constitutional:       General: He is awake. He is not in acute distress.      Appearance: He is well-developed. He is not ill-appearing, toxic-appearing or diaphoretic.      Comments: Appears fatigued.    HENT:      Head: Normocephalic.      Right Ear: Ear canal and external ear normal. A middle ear effusion is present.      Left Ear: Ear canal and external ear normal. A middle ear effusion is present.      Nose: Congestion present.      Mouth/Throat:      Lips: Pink.      Pharynx: Uvula midline. Posterior oropharyngeal erythema present. No pharyngeal swelling, oropharyngeal exudate or uvula swelling.   Eyes:      Conjunctiva/sclera: Conjunctivae normal.   Cardiovascular:      Rate and Rhythm: Normal rate.   Pulmonary:      Effort: Pulmonary effort is normal.      Breath sounds: Normal breath sounds and air entry.   Musculoskeletal:         General: Normal range of motion.      Cervical back: Normal range of motion and neck supple.   Skin:     General: Skin is warm and dry.   Neurological:      Mental Status: He is alert and oriented to person, place, and time.   Psychiatric:         Attention and Perception: Attention normal.         Mood and Affect: Mood normal.         Speech: Speech normal.         Behavior: Behavior normal. Behavior is cooperative.                          Assessment & Plan        1. Sore throat    - POCT GROUP A STREP, PCR  - POCT CoV-2, Flu A/B, RSV by PCR    2. Flu-like symptoms    - POCT GROUP A STREP, PCR  - POCT CoV-2, Flu A/B, RSV by PCR    3. Nasal congestion with rhinorrhea    - fluticasone (FLONASE) 50 MCG/ACT nasal spray; Administer 2 Sprays into affected nostril(S) every day.  Dispense: 9.9 mL; Refill: 0    4. Viral URI  -Maintain hydration/water intake  -May use over the counter Ibuprofen/Tylenol as needed for fever  -May use humidifier/vaporizer at home as needed for dry cough/nasal passages  -Gargle salt water or throat lozenges as needed for throat irritation/dry cough  -Get rest  -May use daily longer acting antihistamine as needed (no decongestant) for  any post nasal drainage   -May use saline nasal spray/Flonase as needed to flush any nasal congestion/post nasal drainage   -Monitor for fevers, worse cough, phlegm, shortness of breath, wheeze, chest tightness- need re-evaluation

## 2023-11-04 NOTE — LETTER
November 4, 2023       Patient: Chaitanya Kelly   YOB: 1975   Date of Visit: 11/4/2023         To Whom It May Concern:    In my medical opinion, I recommend that Chaitanya Kelly be excused from work today and tomorrow (11/5/2023) as he was evaluated in clinic.     If you have any questions or concerns, please don't hesitate to call [phone]          Sincerely,          Keke Harrison A.P.R.N.  Electronically Signed

## 2023-11-06 ASSESSMENT — ENCOUNTER SYMPTOMS
WEAKNESS: 0
NECK PAIN: 0
VOMITING: 0
NAUSEA: 1
CHILLS: 1
SPUTUM PRODUCTION: 0
HEADACHES: 0
EYE REDNESS: 0
DIZZINESS: 0
ABDOMINAL PAIN: 0
CONSTIPATION: 0
MYALGIAS: 1
SHORTNESS OF BREATH: 0
SORE THROAT: 1
EYE DISCHARGE: 0
DIARRHEA: 0
FEVER: 1
WHEEZING: 0

## 2023-11-08 DIAGNOSIS — M54.12 CERVICAL RADICULOPATHY: ICD-10-CM

## 2023-11-08 RX ORDER — GABAPENTIN 300 MG/1
300 CAPSULE ORAL 2 TIMES DAILY
Qty: 60 CAPSULE | Refills: 0 | Status: SHIPPED | OUTPATIENT
Start: 2023-11-08 | End: 2023-12-11

## 2023-11-30 DIAGNOSIS — I10 ESSENTIAL HYPERTENSION: ICD-10-CM

## 2023-12-01 RX ORDER — VERAPAMIL HYDROCHLORIDE 240 MG/1
TABLET, FILM COATED, EXTENDED RELEASE ORAL
Qty: 90 TABLET | Refills: 0 | Status: SHIPPED | OUTPATIENT
Start: 2023-12-01 | End: 2024-03-15

## 2023-12-10 DIAGNOSIS — M54.12 CERVICAL RADICULOPATHY: ICD-10-CM

## 2023-12-11 RX ORDER — GABAPENTIN 300 MG/1
300 CAPSULE ORAL 2 TIMES DAILY
Qty: 60 CAPSULE | Refills: 3 | Status: SHIPPED | OUTPATIENT
Start: 2023-12-11

## 2024-01-21 DIAGNOSIS — I10 ESSENTIAL HYPERTENSION: ICD-10-CM

## 2024-01-22 RX ORDER — DOXAZOSIN 2 MG/1
2 TABLET ORAL
Qty: 90 TABLET | Refills: 0 | Status: SHIPPED | OUTPATIENT
Start: 2024-01-22

## 2024-01-22 NOTE — TELEPHONE ENCOUNTER
Received request via: Pharmacy    Was the patient seen in the last year in this department? Yes    Does the patient have an active prescription (recently filled or refills available) for medication(s) requested? No    Pharmacy Name: [facility]    Does the patient have alf Plus and need 100 day supply (blood pressure, diabetes and cholesterol meds only)? Patient does not have SCP

## 2024-01-30 ENCOUNTER — OFFICE VISIT (OUTPATIENT)
Dept: MEDICAL GROUP | Facility: LAB | Age: 49
End: 2024-01-30
Payer: COMMERCIAL

## 2024-01-30 VITALS
BODY MASS INDEX: 38.55 KG/M2 | DIASTOLIC BLOOD PRESSURE: 70 MMHG | RESPIRATION RATE: 16 BRPM | TEMPERATURE: 97.1 F | HEART RATE: 67 BPM | OXYGEN SATURATION: 96 % | SYSTOLIC BLOOD PRESSURE: 128 MMHG | WEIGHT: 284.61 LBS | HEIGHT: 72 IN

## 2024-01-30 DIAGNOSIS — Z02.89 ENCOUNTER FOR COMPLETION OF FORM WITH PATIENT: ICD-10-CM

## 2024-01-30 DIAGNOSIS — Z51.81 ENCOUNTER FOR MEDICATION MONITORING: ICD-10-CM

## 2024-01-30 DIAGNOSIS — G43.009 MIGRAINE WITHOUT AURA AND WITHOUT STATUS MIGRAINOSUS, NOT INTRACTABLE: ICD-10-CM

## 2024-01-30 DIAGNOSIS — Z12.11 COLON CANCER SCREENING: ICD-10-CM

## 2024-01-30 PROCEDURE — 3078F DIAST BP <80 MM HG: CPT | Performed by: PHYSICIAN ASSISTANT

## 2024-01-30 PROCEDURE — 3074F SYST BP LT 130 MM HG: CPT | Performed by: PHYSICIAN ASSISTANT

## 2024-01-30 PROCEDURE — 99213 OFFICE O/P EST LOW 20 MIN: CPT | Performed by: PHYSICIAN ASSISTANT

## 2024-01-30 RX ORDER — LAMOTRIGINE 200 MG/1
200 TABLET ORAL DAILY
COMMUNITY
Start: 2023-11-08

## 2024-01-30 ASSESSMENT — FIBROSIS 4 INDEX: FIB4 SCORE: 0.47

## 2024-01-30 NOTE — PROGRESS NOTES
Subjective:     CC: migraines, paperwork    HPI:   Chaitanya here today with     Migraines  Patient reports a chronic history of migraines have become more frequent as of late.  He states that currently migraines are occurring every 1/2 weeks or so lasting 1 to 2 days at a time.  He describes his migraines as primarily light and sound sensitivity with allover headache.  Denies significant vision changes or loss of visual field.  He feels like his migraine frequency has been triggered by chronic neck pain as well as increased rest at home.  He has previously tried Tylenol, ibuprofen, Imitrex and another migraine abortive medication that he cannot recall.  He reports that he stays palpitations, anxiety, chest pain with the Imitrex and that the other medications were not effective for mitigating his symptoms.     Last visit with neurology 3-4 years ago, they had discussed daily preventative medication but pt declined at that time. He is requesting re-referral to neurology to discuss treatment options.    Patient is also requesting paperwork for employer allowing for medical accommodation due to migraine episodes and missing work    ROS:  Gen: no fevers/chills, no changes in weight  Eyes: no changes in vision  ENT: no sore throat, no hearing loss, no bloody nose  Pulm: no sob, no cough  CV: no chest pain, no palpitations  GI: no nausea/vomiting, no diarrhea  : no dysuria  MSk: no myalgias  Skin: no rash  Neuro: no headaches, no numbness/tingling  Heme/Lymph: no easy bruising    Current Outpatient Medications Ordered in Epic   Medication Sig Dispense Refill    lamotrigine (LAMICTAL) 200 MG tablet Take 200 mg by mouth every day.      doxazosin (CARDURA) 2 MG Tab TAKE 1 TABLET BY MOUTH AT BEDTIME 90 Tablet 0    gabapentin (NEURONTIN) 300 MG Cap Take 1 capsule by mouth twice daily 60 Capsule 3    verapamil ER (CALAN SR) 240 MG Tab CR Take 1 tablet by mouth once daily 90 Tablet 0    albuterol (PROAIR HFA) 108 (90 Base)  MCG/ACT Aero Soln inhalation aerosol Inhale 2 Puffs every four hours as needed for Shortness of Breath. 8.5 g 0    acetaminophen (TYLENOL) 500 MG Tab Take 2 Tabs by mouth 3 times a day as needed (pain.  Do not exceed 3000 mg of total acetaminophen per day). 180 Tab 6    buPROPion (WELLBUTRIN XL) 150 MG XL tablet Take 1 Tab by mouth 2 Times a Day. 90 Tab 0    lithium CR (ESKALITH CR) 450 MG Tab CR TAKE 1 TABLET BY MOUTH TWICE DAILY 180 Tab 1    fluticasone (FLONASE) 50 MCG/ACT nasal spray Administer 2 Sprays into affected nostril(S) every day. (Patient not taking: Reported on 1/30/2024) 9.9 mL 0     No current Epic-ordered facility-administered medications on file.       Objective:     Exam:  /70 (BP Location: Left arm, Patient Position: Sitting, BP Cuff Size: Adult)   Pulse 67   Temp 36.2 °C (97.1 °F) (Temporal)   Resp 16   Ht 1.829 m (6')   Wt (!) 129 kg (284 lb 9.8 oz)   SpO2 96%   BMI 38.60 kg/m²  Body mass index is 38.6 kg/m².    Gen: Alert and oriented, No apparent distress.  Neck: Neck is supple without lymphadenopathy.  Lungs: Normal effort, CTA bilaterally, no wheezes, rhonchi, or rales  CV: Regular rate and rhythm. No murmurs, rubs, or gallops.  Ext: No clubbing, cyanosis, edema.      Assessment & Plan:     48 y.o. male with the following -     1. Encounter for completion of form with patient  Medical accommodation form for patient's employer has been completed and will be faxed to employer    2. Migraine without aura and without status migrainosus, not intractable   Chronic condition, worsening  Patient reports inreasing frequency and intensity of migraine symptoms which appear to be associated with neck pain, increased rest at home.  He has tried several abortive migraine medications which were ineffective or had deleterious side effects.  He is agreeable to referral to neurology to discuss preventative migraine treatment options  - Referral to Neurology    3. Colon cancer screening  -  Referral to GI for Colonoscopy    4. Encounter for medication monitoring  - LITHIUM; Future        I spent a total of 28 minutes with record review, exam, communication with the patient, communication with other providers, and documentation of this encounter.      No follow-ups on file.    Please note that this dictation was created using voice recognition software. I have made every reasonable attempt to correct obvious errors, but there may be errors of grammar and possibly content that I did not discover before finalizing the note.

## 2024-02-07 ENCOUNTER — TELEPHONE (OUTPATIENT)
Dept: HEALTH INFORMATION MANAGEMENT | Facility: OTHER | Age: 49
End: 2024-02-07

## 2024-03-15 DIAGNOSIS — I10 ESSENTIAL HYPERTENSION: ICD-10-CM

## 2024-03-15 RX ORDER — VERAPAMIL HYDROCHLORIDE 240 MG/1
TABLET, FILM COATED, EXTENDED RELEASE ORAL
Qty: 90 TABLET | Refills: 0 | Status: SHIPPED | OUTPATIENT
Start: 2024-03-15

## 2024-03-15 NOTE — TELEPHONE ENCOUNTER
Received request via: Pharmacy    Was the patient seen in the last year in this department? Yes    Does the patient have an active prescription (recently filled or refills available) for medication(s) requested? No    Pharmacy Name: [facility]    Does the patient have USP Plus and need 100 day supply (blood pressure, diabetes and cholesterol meds only)? Patient does not have SCP

## 2024-04-09 DIAGNOSIS — M54.12 CERVICAL RADICULOPATHY: ICD-10-CM

## 2024-04-09 RX ORDER — GABAPENTIN 300 MG/1
300 CAPSULE ORAL 2 TIMES DAILY
Qty: 60 CAPSULE | Refills: 0 | Status: SHIPPED | OUTPATIENT
Start: 2024-04-09

## 2024-04-09 NOTE — TELEPHONE ENCOUNTER
Received request via: Pharmacy    Was the patient seen in the last year in this department? Yes    Does the patient have an active prescription (recently filled or refills available) for medication(s) requested? No    Pharmacy Name: [facility]    Does the patient have longterm Plus and need 100 day supply (blood pressure, diabetes and cholesterol meds only)? Patient does not have SCP

## 2024-04-19 DIAGNOSIS — I10 ESSENTIAL HYPERTENSION: ICD-10-CM

## 2024-04-19 RX ORDER — DOXAZOSIN 2 MG/1
2 TABLET ORAL
Qty: 90 TABLET | Refills: 0 | Status: SHIPPED | OUTPATIENT
Start: 2024-04-19

## 2024-04-19 NOTE — TELEPHONE ENCOUNTER
Received request via: Pharmacy    Was the patient seen in the last year in this department? Yes    Does the patient have an active prescription (recently filled or refills available) for medication(s) requested? No    Pharmacy Name: [facility]    Does the patient have [facility] Plus and need 100 day supply (blood pressure, diabetes and cholesterol meds only)? Patient does not have SCP

## 2024-05-15 DIAGNOSIS — M54.12 CERVICAL RADICULOPATHY: ICD-10-CM

## 2024-05-15 RX ORDER — GABAPENTIN 300 MG/1
300 CAPSULE ORAL 2 TIMES DAILY
Qty: 60 CAPSULE | Refills: 0 | Status: SHIPPED | OUTPATIENT
Start: 2024-05-15

## 2024-06-08 DIAGNOSIS — I10 ESSENTIAL HYPERTENSION: ICD-10-CM

## 2024-06-10 RX ORDER — VERAPAMIL HYDROCHLORIDE 240 MG/1
TABLET, FILM COATED, EXTENDED RELEASE ORAL
Qty: 90 TABLET | Refills: 0 | Status: SHIPPED | OUTPATIENT
Start: 2024-06-10

## 2024-06-10 NOTE — TELEPHONE ENCOUNTER
Received request via: Pharmacy    Was the patient seen in the last year in this department? Yes  LOV : 1/30/2024   Does the patient have an active prescription (recently filled or refills available) for medication(s) requested? No    Pharmacy Name: [facility]     Does the patient have [facility]FP Plus and need 100 day supply (blood pressure, diabetes and cholesterol meds only)? Patient does not have SCP

## 2024-06-20 DIAGNOSIS — M54.12 CERVICAL RADICULOPATHY: ICD-10-CM

## 2024-06-20 NOTE — TELEPHONE ENCOUNTER
Received request via: Pharmacy    Was the patient seen in the last year in this department? Yes1/30/24    Does the patient have an active prescription (recently filled or refills available) for medication(s) requested? No    [facility]    Does the patient have prison Plus and need 100 day supply (blood pressure, diabetes and cholesterol meds only)? Medication is not for cholesterol, blood pressure or diabetes

## 2024-06-21 RX ORDER — GABAPENTIN 300 MG/1
300 CAPSULE ORAL 2 TIMES DAILY
Qty: 60 CAPSULE | Refills: 0 | Status: SHIPPED | OUTPATIENT
Start: 2024-06-21

## 2024-07-14 ENCOUNTER — OFFICE VISIT (OUTPATIENT)
Dept: URGENT CARE | Facility: PHYSICIAN GROUP | Age: 49
End: 2024-07-14
Payer: COMMERCIAL

## 2024-07-14 VITALS
SYSTOLIC BLOOD PRESSURE: 170 MMHG | HEIGHT: 72 IN | WEIGHT: 286 LBS | TEMPERATURE: 97.5 F | BODY MASS INDEX: 38.74 KG/M2 | RESPIRATION RATE: 16 BRPM | HEART RATE: 81 BPM | OXYGEN SATURATION: 98 % | DIASTOLIC BLOOD PRESSURE: 100 MMHG

## 2024-07-14 DIAGNOSIS — I10 ESSENTIAL HYPERTENSION: ICD-10-CM

## 2024-07-14 DIAGNOSIS — R07.9 CHEST PAIN, UNSPECIFIED TYPE: ICD-10-CM

## 2024-07-14 DIAGNOSIS — I10 HYPERTENSION, UNSPECIFIED TYPE: ICD-10-CM

## 2024-07-14 PROCEDURE — 3080F DIAST BP >= 90 MM HG: CPT

## 2024-07-14 PROCEDURE — 93000 ELECTROCARDIOGRAM COMPLETE: CPT

## 2024-07-14 PROCEDURE — 99214 OFFICE O/P EST MOD 30 MIN: CPT

## 2024-07-14 PROCEDURE — 3077F SYST BP >= 140 MM HG: CPT

## 2024-07-14 ASSESSMENT — PAIN SCALES - GENERAL: PAINLEVEL: 5=MODERATE PAIN

## 2024-07-15 RX ORDER — DOXAZOSIN 2 MG/1
2 TABLET ORAL
Qty: 90 TABLET | Refills: 0 | Status: SHIPPED | OUTPATIENT
Start: 2024-07-15

## 2024-07-17 DIAGNOSIS — M54.12 CERVICAL RADICULOPATHY: ICD-10-CM

## 2024-07-17 RX ORDER — GABAPENTIN 300 MG/1
300 CAPSULE ORAL 2 TIMES DAILY
Qty: 60 CAPSULE | Refills: 0 | Status: SHIPPED | OUTPATIENT
Start: 2024-07-17

## 2024-08-20 DIAGNOSIS — M54.12 CERVICAL RADICULOPATHY: ICD-10-CM

## 2024-08-20 RX ORDER — GABAPENTIN 300 MG/1
300 CAPSULE ORAL 2 TIMES DAILY
Qty: 60 CAPSULE | Refills: 0 | Status: SHIPPED | OUTPATIENT
Start: 2024-08-20

## 2024-08-20 NOTE — TELEPHONE ENCOUNTER
Received request via: Pharmacy    Was the patient seen in the last year in this department? Yes1/30/24    Does the patient have an active prescription (recently filled or refills available) for medication(s) requested? No    Pharmacy Name: [facility]    Does the patient have care home Plus and need 100-day supply? (This applies to ALL medications) Patient does not have SCP

## 2024-09-08 ENCOUNTER — PATIENT MESSAGE (OUTPATIENT)
Dept: MEDICAL GROUP | Facility: LAB | Age: 49
End: 2024-09-08
Payer: COMMERCIAL

## 2024-09-10 DIAGNOSIS — I10 ESSENTIAL HYPERTENSION: ICD-10-CM

## 2024-09-10 RX ORDER — VERAPAMIL HYDROCHLORIDE 240 MG/1
TABLET, FILM COATED, EXTENDED RELEASE ORAL
Qty: 90 TABLET | Refills: 0 | Status: SHIPPED | OUTPATIENT
Start: 2024-09-10

## 2024-09-10 NOTE — TELEPHONE ENCOUNTER
Received request via: Pharmacy    Was the patient seen in the last year in this department? Yes    Does the patient have an active prescription (recently filled or refills available) for medication(s) requested? No    Pharmacy Name: [facility]    Does the patient have nursing home Plus and need 100-day supply? (This applies to ALL medications) Patient does not have SCP

## 2024-09-17 DIAGNOSIS — M54.12 CERVICAL RADICULOPATHY: ICD-10-CM

## 2024-09-17 RX ORDER — GABAPENTIN 300 MG/1
300 CAPSULE ORAL 2 TIMES DAILY
Qty: 60 CAPSULE | Refills: 0 | Status: SHIPPED | OUTPATIENT
Start: 2024-09-17

## 2024-09-17 NOTE — TELEPHONE ENCOUNTER
Received request via: Pharmacy    Was the patient seen in the last year in this department? Yes    Does the patient have an active prescription (recently filled or refills available) for medication(s) requested? No    Pharmacy Name: [facility]    Does the patient have alf Plus and need 100-day supply? (This applies to ALL medications) Patient does not have SCP

## 2024-10-20 DIAGNOSIS — M54.12 CERVICAL RADICULOPATHY: ICD-10-CM

## 2024-10-21 RX ORDER — GABAPENTIN 300 MG/1
300 CAPSULE ORAL 2 TIMES DAILY
Qty: 60 CAPSULE | Refills: 0 | Status: SHIPPED | OUTPATIENT
Start: 2024-10-21

## 2024-10-25 DIAGNOSIS — I10 ESSENTIAL HYPERTENSION: ICD-10-CM

## 2024-10-29 RX ORDER — DOXAZOSIN 2 MG/1
2 TABLET ORAL
Qty: 90 TABLET | Refills: 0 | Status: SHIPPED | OUTPATIENT
Start: 2024-10-29

## 2024-11-11 ENCOUNTER — OFFICE VISIT (OUTPATIENT)
Dept: URGENT CARE | Facility: PHYSICIAN GROUP | Age: 49
End: 2024-11-11
Payer: COMMERCIAL

## 2024-11-11 VITALS
HEIGHT: 73 IN | BODY MASS INDEX: 39.33 KG/M2 | RESPIRATION RATE: 16 BRPM | WEIGHT: 296.74 LBS | OXYGEN SATURATION: 98 % | HEART RATE: 69 BPM | SYSTOLIC BLOOD PRESSURE: 144 MMHG | TEMPERATURE: 99.9 F | DIASTOLIC BLOOD PRESSURE: 84 MMHG

## 2024-11-11 DIAGNOSIS — G43.911 INTRACTABLE MIGRAINE WITH STATUS MIGRAINOSUS, UNSPECIFIED MIGRAINE TYPE: ICD-10-CM

## 2024-11-11 PROCEDURE — 99213 OFFICE O/P EST LOW 20 MIN: CPT | Mod: 25

## 2024-11-11 PROCEDURE — 96372 THER/PROPH/DIAG INJ SC/IM: CPT

## 2024-11-11 RX ORDER — DEXAMETHASONE SODIUM PHOSPHATE 10 MG/ML
10 INJECTION INTRAMUSCULAR; INTRAVENOUS ONCE
Status: COMPLETED | OUTPATIENT
Start: 2024-11-11 | End: 2024-11-11

## 2024-11-11 RX ORDER — HYDROCODONE BITARTRATE AND ACETAMINOPHEN 5; 325 MG/1; MG/1
1 TABLET ORAL EVERY 6 HOURS PRN
Qty: 12 TABLET | Refills: 0 | Status: SHIPPED | OUTPATIENT
Start: 2024-11-11 | End: 2024-11-14

## 2024-11-11 RX ADMIN — DEXAMETHASONE SODIUM PHOSPHATE 10 MG: 10 INJECTION INTRAMUSCULAR; INTRAVENOUS at 16:25

## 2024-11-11 ASSESSMENT — ENCOUNTER SYMPTOMS: HEADACHES: 1

## 2024-11-11 NOTE — PROGRESS NOTES
Subjective     Chaitanya Kelly is a 49 y.o. male who presents with Migraine (x4 days)            Sung is here today with complaints of a migraine that he has had for the past 4 days and is not improving.  He states he has had migraines since he was 11 years old.  However they have worsened since he had a cervical spinal fusion 1 year ago with the [facility] in [place].  He has been taking Tylenol but it does not help he cannot take NSAIDs due to his lithium.  He has been taking gabapentin to help with the pain but it seems to minimally help.  He notes that he was popping his neck 4 days ago and that is what started his migraine.  He has had migraines started by popping his neck previously.  He states the pain starts in the left side of his neck and progresses to the left occipital region.  He describes the neck pain as like a vice .  He has taken muscle relaxers in the past with minimal relief.  Prior to his spinal fusion he was seen by [facility] pain management.  He states he has taken several migraine medications in the past for both preventative and abortive therapy however they have worsened his mental health and he has not seen a headache specialist in several years.  He states that when he has had migraines similar to this in the past the only thing that has helped them has been Norco.  He is interested in seeing neurology and the headache clinic due to the increase in frequency and duration of his migraines over the past year        Headache  Headache pattern:  Some headache always there, and the pain doesn't change much  Date current headache began:  11/8/2024  Providers seen:  Primary care provider  Age of onset (years):  11  ADL impact frequency:  2 to 3 per week  Time of day symptoms are worse:  No specific time of day  Season symptoms are worse:  No particular season  Laterality:  Left side only  Location:  Neck and back of head  Pain severity:  10  Duration:  4 days  Headaches last more than  "three days?: Yes    Changes in thinking and mood:  Irritability and mood changes  Changes in vision:  None  Bilateral symptoms:  None  Unilateral symptoms:  None  Changes in sensation:  Sensitivity to sound  Abortive medications tried:  Acetaminophen (Gabapentin)  Vitamins and supplements tried:  None  Alternative treatments tried:  Physical therapy    Headache red flags  -Recent head trauma: no  -History of exposure to CO, alcohol, drugs: no  -Headaches that awake patient from sleep: no  -Associated change in vision: no  -On anticoagulant therapy: no  -Lack of coordination: no  -Associated neurologic abnormalities (altered mental status, seizure): no  -New onset of headache over the age of 50 years old: no  -Rapid onset of headache with strenuous exercise: no  -Rapid increase in headache frequency: no  -Immunocompromised (Current diagnosis of cancer, HIV): no     Review of Systems   Constitutional:  Negative for fever and malaise/fatigue.   HENT:  Negative for congestion.         Phonophobia   Eyes:  Negative for blurred vision, double vision and photophobia.   Respiratory:  Negative for cough and shortness of breath.    Cardiovascular:  Negative for chest pain.   Gastrointestinal:  Negative for diarrhea, nausea and vomiting.   Skin:  Negative for rash.   Neurological:  Positive for headaches. Negative for dizziness, sensory change, speech change and weakness.   All other systems reviewed and are negative.             Objective     BP (!) 144/84 (BP Location: Right arm, Patient Position: Sitting, BP Cuff Size: Large adult)   Pulse 69   Temp 37.7 °C (99.9 °F) (Temporal)   Resp 16   Ht 1.854 m (6' 1\")   Wt (!) 135 kg (296 lb 11.8 oz)   SpO2 98%   BMI 39.15 kg/m²      Physical Exam  Constitutional:       Appearance: Normal appearance.   HENT:      Head: Normocephalic and atraumatic.        Comments: Describes pain in area as pictured     Right Ear: Tympanic membrane and ear canal normal.      Left Ear: Tympanic " membrane and ear canal normal.      Nose: Nose normal.      Mouth/Throat:      Mouth: Mucous membranes are moist.      Pharynx: Oropharynx is clear.   Eyes:      Conjunctiva/sclera: Conjunctivae normal.   Cardiovascular:      Rate and Rhythm: Normal rate and regular rhythm.      Pulses: Normal pulses.      Heart sounds: Normal heart sounds.   Pulmonary:      Effort: Pulmonary effort is normal.      Breath sounds: Normal breath sounds.   Abdominal:      General: Abdomen is flat.      Palpations: Abdomen is soft.   Musculoskeletal:         General: Normal range of motion.      Cervical back: Tenderness present. No rigidity.   Lymphadenopathy:      Cervical: No cervical adenopathy.   Skin:     General: Skin is warm and dry.   Neurological:      Mental Status: He is alert and oriented to person, place, and time.      Sensory: No sensory deficit.      Motor: No weakness.      Gait: Gait normal.   Psychiatric:         Behavior: Behavior normal.         Judgment: Judgment normal.                             Assessment & Plan        Assessment & Plan  Intractable migraine with status migrainosus, unspecified migraine type    Orders:    dexamethasone (Decadron) injection (check route below) 10 mg    Referral to Neurology    HYDROcodone-acetaminophen (NORCO) 5-325 MG Tab per tablet; Take 1 Tablet by mouth every 6 hours as needed (Severe pain) for up to 3 days.    Consent for Opiate Prescription          After a long discussion with patient as well as review of medication interactions specifically with lithium it was decided to do a single dose of Decadron in the clinic to see if we can relieve some of the pain since he is not eligible for a Toradol shot due to the lithium interaction.      I reviewed his  and last narcotic prescription was in October 2023 and January 2023 respectively.  He was therefore given a 3-day dose of Norco since this is what has interrupted/ended his migraines in the past.  Risks versus benefits  discussed with patient. Additionally discussed increase sedation risk if taking concurrently with gabapentin.  Patient verbalized understanding and opioid contract was reviewed.    Referral placed to neurology headache clinic due to increasing duration, frequency, severity of migraine headaches.

## 2024-11-12 ASSESSMENT — ENCOUNTER SYMPTOMS
DIZZINESS: 0
SENSORY CHANGE: 0
COUGH: 0
DOUBLE VISION: 0
FEVER: 0
SPEECH CHANGE: 0
NAUSEA: 0
WEAKNESS: 0
DIARRHEA: 0
SHORTNESS OF BREATH: 0
BLURRED VISION: 0
VOMITING: 0
PHOTOPHOBIA: 0

## 2024-11-15 DIAGNOSIS — M54.12 CERVICAL RADICULOPATHY: ICD-10-CM

## 2024-11-15 NOTE — TELEPHONE ENCOUNTER
Received request via: Pharmacy    Was the patient seen in the last year in this department? Yes  LOV : 1/30/2024   Does the patient have an active prescription (recently filled or refills available) for medication(s) requested? No    Pharmacy Name: [facility]    Does the patient have longterm Plus and need 100-day supply? (This applies to ALL medications) Patient does not have SCP

## 2024-11-18 RX ORDER — GABAPENTIN 300 MG/1
300 CAPSULE ORAL 2 TIMES DAILY
Qty: 60 CAPSULE | Refills: 0 | Status: SHIPPED | OUTPATIENT
Start: 2024-11-18

## 2024-11-21 ENCOUNTER — TELEPHONE (OUTPATIENT)
Dept: HEALTH INFORMATION MANAGEMENT | Facility: OTHER | Age: 49
End: 2024-11-21
Payer: COMMERCIAL

## 2024-12-10 DIAGNOSIS — I10 ESSENTIAL HYPERTENSION: ICD-10-CM

## 2024-12-10 RX ORDER — VERAPAMIL HYDROCHLORIDE 240 MG/1
TABLET, FILM COATED, EXTENDED RELEASE ORAL
Qty: 90 TABLET | Refills: 0 | Status: SHIPPED | OUTPATIENT
Start: 2024-12-10

## 2024-12-10 NOTE — TELEPHONE ENCOUNTER
Received request via: Pharmacy    Was the patient seen in the last year in this department? Yes    Does the patient have an active prescription (recently filled or refills available) for medication(s) requested? No    Pharmacy Name: [facility]    Does the patient have half-way Plus and need 100-day supply? (This applies to ALL medications) Patient does not have SCP

## 2024-12-17 DIAGNOSIS — M54.12 CERVICAL RADICULOPATHY: ICD-10-CM

## 2024-12-17 RX ORDER — GABAPENTIN 300 MG/1
300 CAPSULE ORAL 2 TIMES DAILY
Qty: 60 CAPSULE | Refills: 0 | Status: SHIPPED | OUTPATIENT
Start: 2024-12-17

## 2024-12-17 NOTE — TELEPHONE ENCOUNTER
Received request via: Pharmacy    Was the patient seen in the last year in this department? Yes    Does the patient have an active prescription (recently filled or refills available) for medication(s) requested? No    Pharmacy Name:   [facility] [address] [place] [id number]  Phone: [phone] Fax: [phone]       Does the patient have prison Plus and need 100-day supply? (This applies to ALL medications) Patient does not have SCP

## 2025-01-14 DIAGNOSIS — M54.12 CERVICAL RADICULOPATHY: ICD-10-CM

## 2025-01-14 RX ORDER — GABAPENTIN 300 MG/1
300 CAPSULE ORAL 2 TIMES DAILY
Qty: 60 CAPSULE | Refills: 0 | Status: SHIPPED | OUTPATIENT
Start: 2025-01-14

## 2025-01-14 NOTE — TELEPHONE ENCOUNTER
Received request via: Pharmacy    Was the patient seen in the last year in this department? Yes    Does the patient have an active prescription (recently filled or refills available) for medication(s) requested? No    Pharmacy Name: [facility]    Does the patient have FDC Plus and need 100-day supply? (This applies to ALL medications) Patient does not have SCP

## 2025-01-23 DIAGNOSIS — I10 ESSENTIAL HYPERTENSION: ICD-10-CM

## 2025-01-24 RX ORDER — DOXAZOSIN 2 MG/1
2 TABLET ORAL
Qty: 90 TABLET | Refills: 0 | Status: SHIPPED | OUTPATIENT
Start: 2025-01-24

## 2025-02-15 ENCOUNTER — OFFICE VISIT (OUTPATIENT)
Dept: URGENT CARE | Facility: PHYSICIAN GROUP | Age: 50
End: 2025-02-15
Payer: COMMERCIAL

## 2025-02-15 VITALS
HEART RATE: 88 BPM | RESPIRATION RATE: 20 BRPM | WEIGHT: 302 LBS | TEMPERATURE: 98.6 F | BODY MASS INDEX: 39.84 KG/M2 | OXYGEN SATURATION: 95 %

## 2025-02-15 DIAGNOSIS — M79.18 CERVICAL MYOFASCIAL PAIN SYNDROME: ICD-10-CM

## 2025-02-15 DIAGNOSIS — I10 ESSENTIAL HYPERTENSION: ICD-10-CM

## 2025-02-15 PROCEDURE — 99214 OFFICE O/P EST MOD 30 MIN: CPT | Performed by: STUDENT IN AN ORGANIZED HEALTH CARE EDUCATION/TRAINING PROGRAM

## 2025-02-15 RX ORDER — LIDOCAINE 4 G/G
PATCH TOPICAL
Qty: 15 PATCH | Refills: 0 | Status: SHIPPED | OUTPATIENT
Start: 2025-02-15

## 2025-02-15 RX ORDER — METHOCARBAMOL 500 MG/1
TABLET, FILM COATED ORAL
Qty: 30 TABLET | Refills: 0 | Status: SHIPPED | OUTPATIENT
Start: 2025-02-15

## 2025-02-16 NOTE — PROGRESS NOTES
Subjective:   CHIEF COMPLAINT  Chief Complaint   Patient presents with    Neck Pain     Pt heard pop on L side, persisting neck pain X2 days Existing neck injury hx. ~2 years       HPI    History of Present Illness  Chaitanya Kelly is a 49 y.o. male who presents for evaluation of left-sided neck pain.  Chronic condition with acute exacerbation.    States 4 days ago while lying in bed he twisted his neck, developing a popping sensation with subsequent pain.  No trauma or direct injury.  Pain is localized along the left side of the neck running into the body of the trapezius muscle and into his shoulder.  No radiculopathy down to his arm, forearm or fingers.  Symptoms are aggravated with side-to-side motion but most notably to the left.  He has tried managing the symptoms with ice and heat.  Also tried Tylenol.  On lithium therefore cannot take NSAIDs.  History of C-spine fusion.    He is currently on lithium and is unable to take NSAIDs, so he manages his pain with Tylenol. He has tried various muscle relaxers in the past but does not recall their names. He is not currently engaging in any exercises but is attempting some stretches, although he is unsure of their effectiveness. He reports no recent fevers or cold-like symptoms but mentions feeling unusually cold for about an hour and a half this morning.    He is currently on verapamil and doxazosin for his blood pressure, which typically remains in the mid-130s range. He admits to not monitoring his salt intake closely. He has not tried any other medications for his blood pressure.       MEDICATIONS  Current: Tylenol, lithium, verapamil, doxazosin        REVIEW OF SYSTEMS  General: no fever or chills  GI: no nausea or vomiting  See HPI for further details.    PAST MEDICAL HISTORY   has a past medical history of Anxiety, Arthritis, ASTHMA, Bipolar 2 disorder, Bipolar disorder ([facility]), Headache(784.0), Pain (10/10/2017), and Seizure ([facility]) (1980).    SURGICAL  HISTORY   has a past surgical history that includes umbilical hernia repair (N/A, 10/18/2017); block epidural steroid injection (N/A, 2020); other; umbilical hernia repair; laminotomy; and cervical disk and fusion anterior (06/10/2021).    ALLERGIES  Allergies   Allergen Reactions    Ibuprofen Unspecified     Interaction with Lithium    Latex Itching    Naprosyn [Naproxen] Unspecified     Interaction with Lithium       CURRENT MEDICATIONS  Home Medications       Reviewed by Fede Solis D.O. (Physician) on 02/15/25 at 1718  Med List Status: <None>     Medication Last Dose Status   acetaminophen (TYLENOL) 500 MG Tab Taking Active   albuterol (PROAIR HFA) 108 (90 Base) MCG/ACT Aero Soln inhalation aerosol Taking Active   buPROPion (WELLBUTRIN XL) 150 MG XL tablet Taking Active   doxazosin (CARDURA) 2 MG Tab Taking Active   gabapentin (NEURONTIN) 300 MG Cap Taking Active   lamotrigine (LAMICTAL) 200 MG tablet Taking Active   lithium CR (ESKALITH CR) 450 MG Tab CR Taking Active   verapamil ER (CALAN SR) 240 MG Tab CR Taking Active                    SOCIAL HISTORY  Social History     Tobacco Use    Smoking status: Former     Current packs/day: 0.00     Types: Cigarettes     Quit date: 2006     Years since quittin.1    Smokeless tobacco: Never    Tobacco comments:     off and on from age [age]   Vaping Use    Vaping status: Never Used   Substance and Sexual Activity    Alcohol use: No    Drug use: No    Sexual activity: Yes     Partners: Female       FAMILY HISTORY  Family History   Problem Relation Age of Onset    Heart Disease Father 41        heart attack    Heart Disease Paternal Aunt     Heart Disease Paternal Uncle         CABGs     Stroke Paternal Uncle         aneursym    Heart Disease Paternal Grandfather     Cancer Maternal Aunt     Lung Cancer Maternal Aunt           Objective:   PHYSICAL EXAM  VITAL SIGNS: BP (!) 162/84 (BP Location: Right arm, Patient Position: Sitting, BP Cuff Size:  Adult)   Pulse 88   Temp 37 °C (98.6 °F) (Temporal)   Resp 20   Wt (!) 137 kg (302 lb)   SpO2 95%   BMI 39.84 kg/m²  BP: 162/84    Gen: no acute distress, normal voice  Skin: dry, intact, moist mucosal membranes  Eyes: No conjunctival injection b/l  Neck: Normal range of motion. No meningeal signs.   Lungs: No increased work of breathing.  CTAB w/ symmetric expansion  CV: RRR w/o murmurs or clicks  MSK: C-spine: No erythema, ecchymosis or edema.  Few degrees loss of range of motion of the left rotation secondary to mild discernible discomfort.  Full range of motion with right rotation, flexion and extension.  TTP along the myofascia of the trapezius muscle without any midline TTP, step-off or crepitus.  No motor or sensory deficits C5-C8 bilaterally.    Assessment/Plan:     1. Cervical myofascial pain syndrome  methocarbamol (ROBAXIN) 500 MG Tab    lidocaine (ASPERFLEX) 4 % Patch      2. Essential hypertension        1) chronic condition with acute exacerbation.  Cannot take NSAIDs due to being on lithium.  History of C-spine fusion.  -Provided a handout reviewing home stretches and exercises and encouraged early range of motion as tolerated  -Continue Tylenol 1000 mg 3 times daily as needed for pain  -Ordered topical lidocaine patch  -Ordered Robaxin.  Side effects including drowsiness were discussed and instructed to use sparingly  -Return to urgent care any new/worsening symptoms or further questions or concerns.  Patient understood everything discussed.  All questions were answered.    2) BP elevated at 162/84.  Currently on lithium therefore cannot be on any ACEs/ARB's.  Currently managing with doxazosin and verapamil.  Recommend he follow-up with his PCP for further management.    Please note that this dictation was created using voice recognition software. I have made a reasonable attempt to correct obvious errors, but I expect that there are errors of grammar and possibly content that I did not  discover before finalizing the note.

## 2025-02-18 DIAGNOSIS — M54.12 CERVICAL RADICULOPATHY: ICD-10-CM

## 2025-02-18 RX ORDER — GABAPENTIN 300 MG/1
300 CAPSULE ORAL 2 TIMES DAILY
Qty: 180 CAPSULE | Refills: 0 | Status: SHIPPED | OUTPATIENT
Start: 2025-02-18

## 2025-02-18 NOTE — TELEPHONE ENCOUNTER
Received request via: Pharmacy    Was the patient seen in the last year in this department? No. Last seen 01/30/2024    Does the patient have an active prescription (recently filled or refills available) for medication(s) requested? No    Pharmacy Name: [facility]    Does the patient have USP Plus and need 100-day supply? (This applies to ALL medications) Patient does not have SCP

## 2025-03-10 DIAGNOSIS — I10 ESSENTIAL HYPERTENSION: ICD-10-CM

## 2025-03-11 RX ORDER — VERAPAMIL HYDROCHLORIDE 240 MG/1
240 TABLET, FILM COATED, EXTENDED RELEASE ORAL DAILY
Qty: 90 TABLET | Refills: 0 | Status: SHIPPED | OUTPATIENT
Start: 2025-03-11

## 2025-03-11 NOTE — TELEPHONE ENCOUNTER
Received request via: Pharmacy    Was the patient seen in the last year in this department? No. Last seen 01/30/2024    Does the patient have an active prescription (recently filled or refills available) for medication(s) requested? No    Pharmacy Name: [facility]    Does the patient have jail Plus and need 100-day supply? (This applies to ALL medications) Patient does not have SCP

## 2025-04-19 DIAGNOSIS — I10 ESSENTIAL HYPERTENSION: ICD-10-CM

## 2025-04-21 RX ORDER — DOXAZOSIN 2 MG/1
2 TABLET ORAL
Qty: 90 TABLET | Refills: 0 | Status: SHIPPED | OUTPATIENT
Start: 2025-04-21

## 2025-04-21 NOTE — TELEPHONE ENCOUNTER
Received request via: Pharmacy    Was the patient seen in the last year in this department? No. Last seen 01/30/2024    Does the patient have an active prescription (recently filled or refills available) for medication(s) requested? No    Pharmacy Name: [facility]    Does the patient have [facility]CI Plus and need 100-day supply? (This applies to ALL medications) Patient does not have SCP

## 2025-05-01 ENCOUNTER — OFFICE VISIT (OUTPATIENT)
Dept: URGENT CARE | Facility: PHYSICIAN GROUP | Age: 50
End: 2025-05-01
Payer: COMMERCIAL

## 2025-05-01 VITALS
WEIGHT: 298.8 LBS | SYSTOLIC BLOOD PRESSURE: 140 MMHG | HEIGHT: 73 IN | OXYGEN SATURATION: 96 % | RESPIRATION RATE: 16 BRPM | BODY MASS INDEX: 39.6 KG/M2 | TEMPERATURE: 98.8 F | DIASTOLIC BLOOD PRESSURE: 90 MMHG | HEART RATE: 78 BPM

## 2025-05-01 DIAGNOSIS — H02.89 IRRITATION OF EYELID: ICD-10-CM

## 2025-05-01 PROCEDURE — 3077F SYST BP >= 140 MM HG: CPT | Performed by: NURSE PRACTITIONER

## 2025-05-01 PROCEDURE — 99213 OFFICE O/P EST LOW 20 MIN: CPT | Performed by: NURSE PRACTITIONER

## 2025-05-01 PROCEDURE — 3080F DIAST BP >= 90 MM HG: CPT | Performed by: NURSE PRACTITIONER

## 2025-05-01 ASSESSMENT — VISUAL ACUITY
OD_CC: 20/30
OS_CC: 20/25

## 2025-05-02 NOTE — PROGRESS NOTES
Chief Complaint   Patient presents with    Pain     Outer edge of right eye, painful to touch, feeling like skin separation, sometimes blurred vision.  X 3 days.          History of Present Illness  The patient is a 49-year-old male who presents for evaluation of right eye irritation.    He reports experiencing discomfort in around right eye, which he describes as a sensation of disconnection between the muscle and skin tendon. This discomfort has been present for approximately 3 days.  He does not use contact lenses and denies any potential trauma to the eye. The pain is to touch but minor, and it is not associated with eye movement. Occasional itching and rubbing of the eye exacerbate the discomfort. There is no burning sensation, but mild tenderness in the eyeball is reported. He has not had a recent eye examination and notes a gradual decline in his vision. No acute changes    He uses a full mask CPAP machine and believes that an improper seal may be contributing to his symptoms due to air blowing into his face. He has not consulted an ophthalmologist in several years.       ROS:    No severe shortness of breath   No Cardiac like chest pain, as discussed   As otherwise stated in HPI    Medical/SX/ Social History:  Reviewed per chart    Pertinent Medications:    Current Outpatient Medications on File Prior to Visit   Medication Sig Dispense Refill    doxazosin (CARDURA) 2 MG Tab Take 1 Tablet by mouth at bedtime. Needs appointment 90 Tablet 0    gabapentin (NEURONTIN) 300 MG Cap Take 1 Capsule by mouth 2 times a day. Needs appt for additional refills 180 Capsule 0    lidocaine (ASPERFLEX) 4 % Patch Apply patch to painful area. Patch may remain in place for up to 12 hours in any 24-hour period. No more than 1 patch can be used in a 24-hour period. 15 Patch 0    lamotrigine (LAMICTAL) 200 MG tablet Take 200 mg by mouth every day.      acetaminophen (TYLENOL) 500 MG Tab Take 2 Tabs by mouth 3 times a day as needed  (pain.  Do not exceed 3000 mg of total acetaminophen per day). 180 Tab 6    buPROPion (WELLBUTRIN XL) 150 MG XL tablet Take 1 Tab by mouth 2 Times a Day. 90 Tab 0    lithium CR (ESKALITH CR) 450 MG Tab CR TAKE 1 TABLET BY MOUTH TWICE DAILY 180 Tab 1    verapamil ER (CALAN SR) 240 MG Tab CR Take 1 Tablet by mouth every day. Needs appointment for additional refills (Patient not taking: Reported on 5/1/2025) 90 Tablet 0    methocarbamol (ROBAXIN) 500 MG Tab Take 1 to 2 tablets every 6 hours as needed for pain/muscle spasms. (Patient not taking: Reported on 5/1/2025) 30 Tablet 0    albuterol (PROAIR HFA) 108 (90 Base) MCG/ACT Aero Soln inhalation aerosol Inhale 2 Puffs every four hours as needed for Shortness of Breath. (Patient not taking: Reported on 5/1/2025) 8.5 g 0     No current facility-administered medications on file prior to visit.        Allergies:    Ibuprofen, Latex, and Naprosyn [naproxen]     Problem list, medications, and allergies reviewed by myself today in Epic     Physical Exam:    Vitals:    05/01/25 1708   BP: (!) 140/90   Pulse: 78   Resp: 16   Temp: 37.1 °C (98.8 °F)   SpO2: 96%             Physical Exam  Constitutional:       General: He is awake.      Appearance: Normal appearance. He is not ill-appearing, toxic-appearing or diaphoretic.   HENT:      Head: Normocephalic and atraumatic.      Right Ear: Tympanic membrane, ear canal and external ear normal.      Left Ear: Tympanic membrane, ear canal and external ear normal.      Nose: Nose normal.      Mouth/Throat:      Lips: Pink.      Mouth: Mucous membranes are moist.      Pharynx: Oropharynx is clear.   Eyes:      General: Lids are normal. Gaze aligned appropriately. No allergic shiner or scleral icterus.     Extraocular Movements: Extraocular movements intact.      Conjunctiva/sclera: Conjunctivae normal.      Right eye: Right conjunctiva is not injected. No chemosis, exudate or hemorrhage.     Left eye: Left conjunctiva is not injected.       Pupils: Pupils are equal, round, and reactive to light.      Right eye: Pupil is not sluggish.      Funduscopic exam:     Right eye: Red reflex present.        Comments: Visual acuity is grossly intact bilateral 20/20 left eye 20/25 right eye 20/30.  No pain with eye movements   Cardiovascular:      Rate and Rhythm: Normal rate and regular rhythm.      Pulses:           Radial pulses are 2+ on the right side and 2+ on the left side.      Heart sounds: Normal heart sounds.   Pulmonary:      Effort: Pulmonary effort is normal.      Breath sounds: Normal breath sounds and air entry. No decreased breath sounds, wheezing, rhonchi or rales.   Musculoskeletal:      Right lower leg: No edema.      Left lower leg: No edema.   Lymphadenopathy:      Cervical: No cervical adenopathy.   Skin:     General: Skin is warm.      Capillary Refill: Capillary refill takes less than 2 seconds.      Coloration: Skin is not cyanotic or pale.   Neurological:      Mental Status: He is alert and oriented to person, place, and time.      Gait: Gait is intact.   Psychiatric:         Behavior: Behavior normal. Behavior is cooperative.            Medical Decision making and plan :  I personally reviewed prior external notes and test results pertinent to today's visit. Pt is clinically stable at today's acute urgent care visit.  Patient appears nontoxic with no acute distress noted. Appropriate for outpatient care at this time.    Pleasant 49 y.o. male presented clinic with:     Assessment & Plan  1. Right orbital  irritation.  - Upon examination, there is no discernible cause for the reported symptoms. The possibility of shingles was considered but ruled out due to the absence of burning or stabbing pain.  - Orbital cellulitis was also considered but deemed unlikely due to the lack of severe pain during eye movement, recent sinus infection, or trauma. His visual acuity remains satisfactory, and there are no signs of infection or symptoms  indicative of orbital cellulitis.  - He does not have acute vision changes or double vision. Mild tearfulness is present, but there are no symptoms suggestive of shingles. He is advised to monitor his condition closely.  - Over-the-counter allergy eyedrops such as Pataday may be used to alleviate inflammation. For pain management, Tylenol and ibuprofen are recommended. He is encouraged to schedule an appointment with an ophthalmologist for further evaluation. Should he experience a sudden decline in vision, severe pain during eye movement, or fever, he is advised to seek immediate medical attention at the emergency room.      Shared decision-making was utilized with patient for treatment plan. Medication discussed included indication for use and the potential benefits and side effects. Education was provided regarding the aforementioned assessments.  Differential Diagnosis, natural history, and supportive care discussed. All of the patient's questions were answered to their satisfaction at the time of discharge. Patient was encouraged to monitor symptoms closely. Those signs and symptoms which would warrant concern and mandate seeking a higher level of service through the emergency department discussed at length.  Patient stated agreement and understanding of this plan of care.    Disposition:  Home in stable condition     Voice Recognition Disclaimer:  Portions of this document were created using voice recognition software and [employer] technology provided by [facility].  Patient was informed of [email or web address] technology being used.  The software does have a chance of producing errors of grammar and possibly content. I have made every reasonable attempt to correct obvious errors, but there could be errors of grammar and possibly content that I did not discover before finalizing the documentation.    RESHMA Rizzo.

## 2025-05-20 ENCOUNTER — TELEPHONE (OUTPATIENT)
Dept: MEDICAL GROUP | Facility: LAB | Age: 50
End: 2025-05-20
Payer: COMMERCIAL

## 2025-05-20 DIAGNOSIS — M54.12 CERVICAL RADICULOPATHY: ICD-10-CM

## 2025-05-20 RX ORDER — GABAPENTIN 300 MG/1
300 CAPSULE ORAL 2 TIMES DAILY
Qty: 180 CAPSULE | Refills: 0 | Status: SHIPPED | OUTPATIENT
Start: 2025-05-20

## 2025-05-20 NOTE — TELEPHONE ENCOUNTER
Phone Number Called: [phone]    Call outcome: Spoke to patient regarding message below.    Message: let pt know needs appt for refill set up appt next week Tuesday will run out before that wants to know if can get just enough until appt

## 2025-05-20 NOTE — TELEPHONE ENCOUNTER
VOICEMAIL  1. Caller Name: Chaitanya                      Call Back Number: [phone]    2. Message: LVM asking for refill gabapentin was told by pharmacy to contact provider    3. Patient approves office to leave a detailed voicemail/MyChart message: no

## 2025-05-28 ENCOUNTER — APPOINTMENT (OUTPATIENT)
Dept: MEDICAL GROUP | Facility: LAB | Age: 50
End: 2025-05-28
Payer: COMMERCIAL

## 2025-06-03 ENCOUNTER — APPOINTMENT (OUTPATIENT)
Dept: MEDICAL GROUP | Facility: LAB | Age: 50
End: 2025-06-03
Payer: COMMERCIAL

## 2025-06-03 VITALS
HEART RATE: 76 BPM | OXYGEN SATURATION: 97 % | BODY MASS INDEX: 39.47 KG/M2 | WEIGHT: 297.8 LBS | TEMPERATURE: 97.9 F | DIASTOLIC BLOOD PRESSURE: 72 MMHG | RESPIRATION RATE: 16 BRPM | SYSTOLIC BLOOD PRESSURE: 126 MMHG | HEIGHT: 73 IN

## 2025-06-03 DIAGNOSIS — Z11.59 NEED FOR HEPATITIS C SCREENING TEST: ICD-10-CM

## 2025-06-03 DIAGNOSIS — Z23 NEED FOR VACCINATION: ICD-10-CM

## 2025-06-03 DIAGNOSIS — F31.81 BIPOLAR 2 DISORDER (HCC): ICD-10-CM

## 2025-06-03 DIAGNOSIS — E78.5 DYSLIPIDEMIA: ICD-10-CM

## 2025-06-03 DIAGNOSIS — E55.9 VITAMIN D DEFICIENCY: Primary | ICD-10-CM

## 2025-06-03 DIAGNOSIS — Z11.4 ENCOUNTER FOR SCREENING FOR HIV: ICD-10-CM

## 2025-06-03 DIAGNOSIS — I10 ESSENTIAL HYPERTENSION: ICD-10-CM

## 2025-06-03 DIAGNOSIS — Z00.00 WELLNESS EXAMINATION: ICD-10-CM

## 2025-06-03 DIAGNOSIS — E66.9 OBESITY (BMI 30-39.9): ICD-10-CM

## 2025-06-03 DIAGNOSIS — Z12.11 COLON CANCER SCREENING: ICD-10-CM

## 2025-06-03 DIAGNOSIS — M54.12 CERVICAL RADICULOPATHY: ICD-10-CM

## 2025-06-03 PROCEDURE — 3074F SYST BP LT 130 MM HG: CPT | Performed by: PHYSICIAN ASSISTANT

## 2025-06-03 PROCEDURE — 3078F DIAST BP <80 MM HG: CPT | Performed by: PHYSICIAN ASSISTANT

## 2025-06-03 PROCEDURE — 90715 TDAP VACCINE 7 YRS/> IM: CPT | Performed by: PHYSICIAN ASSISTANT

## 2025-06-03 PROCEDURE — 90471 IMMUNIZATION ADMIN: CPT | Performed by: PHYSICIAN ASSISTANT

## 2025-06-03 PROCEDURE — 99396 PREV VISIT EST AGE 40-64: CPT | Mod: 25 | Performed by: PHYSICIAN ASSISTANT

## 2025-06-03 RX ORDER — GABAPENTIN 300 MG/1
300 CAPSULE ORAL 2 TIMES DAILY
Qty: 180 CAPSULE | Refills: 3 | Status: SHIPPED | OUTPATIENT
Start: 2025-06-03

## 2025-06-03 RX ORDER — VERAPAMIL HYDROCHLORIDE 240 MG/1
240 TABLET, FILM COATED, EXTENDED RELEASE ORAL DAILY
Qty: 90 TABLET | Refills: 3 | Status: SHIPPED | OUTPATIENT
Start: 2025-06-03

## 2025-06-03 RX ORDER — DOXAZOSIN 2 MG/1
2 TABLET ORAL
Qty: 90 TABLET | Refills: 3 | Status: SHIPPED | OUTPATIENT
Start: 2025-06-03

## 2025-06-03 NOTE — PROGRESS NOTES
Subjective:     History of Present Illness  Subjective:     CC:   Chief Complaint   Patient presents with    Follow-Up       HPI:   Chaitanya Kelly is a 49 y.o. male who presents for an annual exam. He is feeling well and has no complaints.  Verbal consent was acquired by the patient to use [employer] ambient listening note generation during this visit Yes     The patient presents for an annual wellness visit.    Recent Cold  - Managed with OTC medications, currently resolving  - Spouse, a smoker, has had a prolonged illness    Hypertension  Currently managed with verapamil 240 mg daily, doxazosin 2 mg nightly.  Patient is requesting refills for this medications.  Blood pressure at goal today in clinic.  Denies hypertensive symptoms    Cervical radiculopathy  This is a chronic condition for the patient, currently managed with gabapentin 300 mg twice daily.  He is interested in trying to titrate down off this medication    Colonoscopy  - Prefers the at-home Cologuard test over a colonoscopy    Vaccinations  - Has not received the shingles vaccine  - Uncertain about tetanus vaccination status in the past decade    Allergies and Family History  - No changes in allergies  - No changes in family history    Substance Use  - Former smoker  - No alcohol or recreational substance use  - Recalls a single instance of marijuana use at age 21, resulting in a manic episode lasting four days, likely due to their bipolar disorder and concurrent lithium use    Supplemental information: None    SOCIAL HISTORY  Former smoker, no alcohol or recreational substance use.    Health Maintenance  Cholesterol Screening: Ordered   Diabetes Screening: Ordered   Diet: Regular   Substance Abuse: Denies     Cancer screening  Colorectal Cancer Screening: Cologuard ordered       Infectious disease screening/Immunizations  --HIV Screening: Ordered   --Hepatitis C Screening: Ordered   --Immunizations: Tdap given today, due for shingles, due for  PCV 20    He  has a past medical history of Anxiety, Arthritis, ASTHMA, Bipolar 2 disorder, Bipolar disorder ([facility]), Headache(784.0), Pain (10/10/2017), and Seizure ([facility]) ().  He  has a past surgical history that includes umbilical hernia repair (N/A, 10/18/2017); block epidural steroid injection (N/A, 2020); other; umbilical hernia repair; laminotomy; and cervical disk and fusion anterior (06/10/2021).  Family History   Problem Relation Age of Onset    Heart Disease Father 41        heart attack    Heart Disease Paternal Aunt     Heart Disease Paternal Uncle         CABGs     Stroke Paternal Uncle         aneursym    Heart Disease Paternal Grandfather     Cancer Maternal Aunt     Lung Cancer Maternal Aunt      Social History     Tobacco Use    Smoking status: Former     Current packs/day: 0.00     Types: Cigarettes     Quit date: 2006     Years since quittin.4     Passive exposure: Past    Smokeless tobacco: Never    Tobacco comments:     off and on from age [age]   Vaping Use    Vaping status: Never Used   Substance Use Topics    Alcohol use: No    Drug use: No       Patient Active Problem List    Diagnosis Date Noted    Cervical radiculopathy 10/05/2022    Carpal tunnel syndrome 2021    Other chronic postprocedural pain 2021    Chronic left shoulder pain 2021    S/P cervical spinal fusion 2021    Skin change 2020    Vitamin D deficiency 10/03/2020    Celiac disease 2020    Vertigo 2020    Essential hypertension 2020    Umbilical hernia without obstruction or gangrene 10/18/2017    Abdominal wall bulge 2017    Mild intermittent asthma without complication 2016    Migraine without aura and without status migrainosus, not intractable 2016    Chronic fatigue 2016    Neck pain 2016    Obstructive sleep apnea 04/15/2016    Bipolar 2 disorder (HCC) 2014    Dyslipidemia 2014    Obesity (BMI 30-39.9) 2014     "Family history of early CAD 02/06/2014       Current Outpatient Medications   Medication Sig Dispense Refill    gabapentin (NEURONTIN) 300 MG Cap Take 1 Capsule by mouth 2 times a day. 180 Capsule 3    doxazosin (CARDURA) 2 MG Tab Take 1 Tablet by mouth at bedtime. 90 Tablet 3    verapamil ER (CALAN SR) 240 MG Tab CR Take 1 Tablet by mouth every day. 90 Tablet 3    lidocaine (ASPERFLEX) 4 % Patch Apply patch to painful area. Patch may remain in place for up to 12 hours in any 24-hour period. No more than 1 patch can be used in a 24-hour period. 15 Patch 0    lamotrigine (LAMICTAL) 200 MG tablet Take 200 mg by mouth every day.      acetaminophen (TYLENOL) 500 MG Tab Take 2 Tabs by mouth 3 times a day as needed (pain.  Do not exceed 3000 mg of total acetaminophen per day). 180 Tab 6    buPROPion (WELLBUTRIN XL) 150 MG XL tablet Take 1 Tab by mouth 2 Times a Day. 90 Tab 0    lithium CR (ESKALITH CR) 450 MG Tab CR TAKE 1 TABLET BY MOUTH TWICE DAILY 180 Tab 1     No current facility-administered medications for this visit.    (including changes today)  Allergies: Ibuprofen, Latex, and Naprosyn [naproxen]    Review of Systems   Constitutional: Negative for fever, chills and malaise/fatigue.   HENT: Negative for congestion.    Eyes: Negative for pain.   Respiratory: Negative for cough and shortness of breath.    Cardiovascular: Negative for leg swelling.   Gastrointestinal: Negative for nausea, vomiting, abdominal pain and diarrhea.   Genitourinary: Negative for dysuria and hematuria.   Skin: Negative for rash.   Neurological: Negative for dizziness, focal weakness and headaches.   Endo/Heme/Allergies: Does not bleed easily.   Psychiatric/Behavioral: Negative for depression.  The patient is not nervous/anxious.      Objective:     /72 (BP Location: Right arm, Patient Position: Sitting, BP Cuff Size: Adult)   Pulse 76   Temp 36.6 °C (97.9 °F) (Temporal)   Resp 16   Ht 1.854 m (6' 1\")   Wt (!) 135 kg (297 lb 12.8 " oz)   SpO2 97%   BMI 39.29 kg/m²   Body mass index is 39.29 kg/m².  Wt Readings from Last 4 Encounters:   06/03/25 (!) 135 kg (297 lb 12.8 oz)   05/01/25 (!) 136 kg (298 lb 12.8 oz)   02/15/25 (!) 137 kg (302 lb)   11/11/24 (!) 135 kg (296 lb 11.8 oz)       Physical Exam:  Constitutional: Well-developed and well-nourished. Not diaphoretic. No distress.   Skin: Skin is warm and dry. No rash noted.  Head: Atraumatic without lesions.  Eyes: Conjunctivae and extraocular motions are normal. Pupils are equal, round, and reactive to light. No scleral icterus.   Ears:  External ears unremarkable. Tympanic membranes clear and intact.  Nose: Nares patent. Septum midline. Turbinates without erythema nor edema. No discharge.   Mouth/Throat: Dentition is intact. Tongue normal. Oropharynx is clear and moist. Posterior pharynx without erythema or exudates.  Neck: Supple, trachea midline. Normal range of motion. No thyromegaly present. No lymphadenopathy--cervical or supraclavicular.  Cardiovascular: Regular rate and rhythm, S1 and S2 without murmur, rubs, or gallops.    Lungs: Effort normal. Clear to auscultation throughout. No adventitious sounds. No CVA tenderness.  Abdomen: Soft, non tender, and without distention. Active bowel sounds in all four quadrants. No rebound, guarding, masses or HSM.  Extremities: No cyanosis, clubbing, erythema, nor edema. Distal pulses intact and symmetric.   Musculoskeletal: All major joints AROM full in all directions without pain.  Neurological: Alert and oriented x 3. DTRs 2+/3 and symmetric. No cranial nerve deficit. 5/5 myotomes. Sensation intact.  Psychiatric:  Behavior, mood, and affect are appropriate.      Assessment and Plan:     1. Wellness examination  Diagnostic plan: Referral for colonoscopy initiated. Ordered standard annual labs: CBC, kidney function, liver function, cholesterol, blood sugar, and lithium level.  Informed about shingles vaccine at local pharmacy and pneumococcal  pneumonia vaccine at clinic. Advised to fast for 8 hours prior to lab tests. Medication refills processed and sent to pharmacy.    2. Cervical radiculopathy  Treatment plan: Advised to reduce gabapentin to one tablet daily and monitor response.   - gabapentin (NEURONTIN) 300 MG Cap; Take 1 Capsule by mouth 2 times a day.  Dispense: 180 Capsule; Refill: 3    3. Essential hypertension  Current condition, well-controlled  Blood pressure at goal today in clinic, continue current medications  - doxazosin (CARDURA) 2 MG Tab; Take 1 Tablet by mouth at bedtime.  Dispense: 90 Tablet; Refill: 3  - verapamil ER (CALAN SR) 240 MG Tab CR; Take 1 Tablet by mouth every day.  Dispense: 90 Tablet; Refill: 3    4. Vitamin D deficiency (Primary)  Due for recheck  - VITAMIN D,25 HYDROXY (DEFICIENCY); Future    5. Dyslipidemia  Due for recheck  - CBC WITH DIFFERENTIAL; Future  - Comp Metabolic Panel; Future  - Lipid Profile; Future    6. Bipolar 2 disorder (HCC)  Chronic condition, stable  Managed by psychiatry, continue follow-up with them for medication management  - LITHIUM; Future    7. Obesity (BMI 30-39.9)  Patient's lifestyle was discussed today, including healthy low-carb diet, 30-minutes of moderate exercise daily and avoiding sugars and high-fat foods.    - Lipid Profile; Future  - TSH WITH REFLEX TO FT4; Future  - HEMOGLOBIN A1C; Future    8. Colon cancer screening  - Referral to GI for Colonoscopy    9. Encounter for screening for HIV  - HIV AG/AB COMBO ASSAY SCREENING; Future    10. Need for hepatitis C screening test  - HEP C VIRUS ANTIBODY; Future    11. Need for vaccination  - Tdap =>6yo IM      Follow-up: No follow-ups on file.      I spent a total of 23 minutes with record review, exam, communication with the patient, communication with other providers, and documentation of this encounter.      No follow-ups on file.    Please note that this dictation was created using voice recognition software. I have made every  reasonable attempt to correct obvious errors, but there may be errors of grammar and possibly content that I did not discover before finalizing the note.

## 2025-06-11 NOTE — Clinical Note
REFERRAL APPROVAL NOTICE         Sent on June 11, 2025                   Chaitanya Kelly  [address]  [address]  [place] [address]                   Dear Mr. Kelly,    After a careful review of the medical information and benefit coverage, [facility] has processed your referral. See below for additional details.    If applicable, you must be actively enrolled with your insurance for coverage of the authorized service. If you have any questions regarding your coverage, please contact your insurance directly.    REFERRAL INFORMATION   Referral #:  [id number]  Referred-To Provider    Referred-By Provider:  Gastroenterology    Ebony Rojas P.A.-C.   [facility]      [address]  [place] [id number] [phone] [address] [address]  [phone]    Referral Start Date:  06/03/2025  Referral End Date:   06/03/2026             SCHEDULING  If you do not already have an appointment, please call [phone] to make an appointment.     MORE INFORMATION  If you do not already have a [facility] account, sign up at: [email or web address]  You can access your medical information, make appointments, see lab results, billing information, and more.  If you have questions regarding this referral, please contact  the [facility] Referrals department at:             [phone]. Monday - Friday 8:00AM - 5:00PM.     Sincerely,    [facility]

## (undated) DEVICE — KIT ANESTHESIA W/CIRCUIT & 3/LT BAG W/FILTER (20EA/CA)

## (undated) DEVICE — DRAPE LAPAROTOMY T SHEET - (12EA/CA)

## (undated) DEVICE — KIT ROOM DECONTAMINATION

## (undated) DEVICE — GLOVE BIOGEL PI INDICATOR SZ 7.0 SURGICAL PF LF - (50/BX 4BX/CA)

## (undated) DEVICE — SLEEVE, VASO, THIGH, MED

## (undated) DEVICE — CANISTER SUCTION 3000ML MECHANICAL FILTER AUTO SHUTOFF MEDI-VAC NONSTERILE LF DISP  (40EA/CA)

## (undated) DEVICE — SUTURE 3-0 VICRYL PLUS - 12 X 18 INCH (12/BX)

## (undated) DEVICE — SUTURE 3-0 VICRYL PLUS SH - 8X 18 INCH (12/BX)

## (undated) DEVICE — SET EXTENSION WITH 2 PORTS (48EA/CA) ***PART #2C8610 IS A SUBSTITUTE*****

## (undated) DEVICE — LACTATED RINGERS INJ 1000 ML - (14EA/CA 60CA/PF)

## (undated) DEVICE — HEAD HOLDER JUNIOR/ADULT

## (undated) DEVICE — CHLORAPREP 26 ML APPLICATOR - ORANGE TINT(25/CA)

## (undated) DEVICE — GLOVE SZ 7.5 BIOGEL PI MICRO - PF LF (50PR/BX)

## (undated) DEVICE — GLOVE SZ 6.5 BIOGEL PI MICRO - PF LF (50PR/BX)

## (undated) DEVICE — TUBE E-T HI-LO CUFF 8.5MM (10EA/PK)

## (undated) DEVICE — ELECTRODE 850 FOAM ADHESIVE - HYDROGEL RADIOTRNSPRNT (50/PK)

## (undated) DEVICE — GLOVE BIOGEL PI INDICATOR SZ 6.5 SURGICAL PF LF - (50/BX 4BX/CA)

## (undated) DEVICE — GLOVE BIOGEL PI INDICATOR SZ 7.5 SURGICAL PF LF -(50/BX 4BX/CA)

## (undated) DEVICE — TUBING CLEARLINK DUO-VENT - C-FLO (48EA/CA)

## (undated) DEVICE — SENSOR SPO2 NEO LNCS ADHESIVE (20/BX) SEE USER NOTES

## (undated) DEVICE — PACK MAJOR BASIN - (2EA/CA)

## (undated) DEVICE — SYRINGE 10 ML CONTROL LL (25EA/BX 4BX/CA)

## (undated) DEVICE — SUTURE GENERAL

## (undated) DEVICE — GOWN WARMING STANDARD FLEX - (30/CA)

## (undated) DEVICE — SUCTION INSTRUMENT YANKAUER BULBOUS TIP W/O VENT (50EA/CA)

## (undated) DEVICE — BLADE SURGICAL #15 - (50/BX 3BX/CA)

## (undated) DEVICE — MASK ANESTHESIA ADULT  - (100/CA)

## (undated) DEVICE — ELECTRODE DUAL RETURN W/ CORD - (50/PK)

## (undated) DEVICE — NEPTUNE 4 PORT MANIFOLD - (20/PK)

## (undated) DEVICE — SUTURE 0 PROLENE CT C/R - (12/BX)

## (undated) DEVICE — SET LEADWIRE 5 LEAD BEDSIDE DISPOSABLE ECG (1SET OF 5/EA)

## (undated) DEVICE — PROTECTOR ULNA NERVE - (36PR/CA)

## (undated) DEVICE — SUTURE 4-0 MONOCRYL PLUSPC-3 - 18 INCH (12/BX)

## (undated) DEVICE — SODIUM CHL IRRIGATION 0.9% 1000ML (12EA/CA)